# Patient Record
Sex: MALE | Race: WHITE | NOT HISPANIC OR LATINO | Employment: FULL TIME | URBAN - METROPOLITAN AREA
[De-identification: names, ages, dates, MRNs, and addresses within clinical notes are randomized per-mention and may not be internally consistent; named-entity substitution may affect disease eponyms.]

---

## 2017-02-10 ENCOUNTER — OFFICE VISIT (OUTPATIENT)
Dept: INTERNAL MEDICINE | Facility: CLINIC | Age: 57
End: 2017-02-10

## 2017-02-10 VITALS
WEIGHT: 292 LBS | DIASTOLIC BLOOD PRESSURE: 96 MMHG | SYSTOLIC BLOOD PRESSURE: 138 MMHG | OXYGEN SATURATION: 93 % | HEART RATE: 84 BPM | BODY MASS INDEX: 35.56 KG/M2 | RESPIRATION RATE: 16 BRPM | HEIGHT: 76 IN | TEMPERATURE: 98.1 F

## 2017-02-10 DIAGNOSIS — M10.00 IDIOPATHIC GOUT, UNSPECIFIED CHRONICITY, UNSPECIFIED SITE: ICD-10-CM

## 2017-02-10 DIAGNOSIS — E78.5 HYPERLIPIDEMIA, UNSPECIFIED HYPERLIPIDEMIA TYPE: ICD-10-CM

## 2017-02-10 DIAGNOSIS — E66.9 ADIPOSITY: ICD-10-CM

## 2017-02-10 DIAGNOSIS — I10 ESSENTIAL HYPERTENSION: Primary | ICD-10-CM

## 2017-02-10 PROCEDURE — 99213 OFFICE O/P EST LOW 20 MIN: CPT | Performed by: INTERNAL MEDICINE

## 2017-02-10 NOTE — PROGRESS NOTES
Subjective   Jake Saunders is a 56 y.o. male.   11/13/2016  Wt Readings from Last 1 Encounters:   02/10/17 292 lb (132 kg)    he was last seen July 2016, weight 284 then, now 292.    He returns for follow-up of hypertension, hyperlipidemia, and gout    History of Present Illness   He has hypertension treated with metoprolol succinate 50 mg daily and Tribenzor 40/5/25 one each day.  He denies having any chest pain on exertion.  He has no history of known heart disease.    He has bowel hypertriglyceridemia for which diet has been advised.    He has gout and is treated with allopurinol 300 mg daily for prevention.  He has not had any recurrence of gout.    He had prostate screen in July 2016.  His voiding pattern is stable.    I he had a negative screening colonoscopy in September 2014 by Dr. Ceballos, advised repeat after 10 years.    He does not describe any new problems.  The following portions of the patient's history were reviewed and updated as appropriate: allergies, current medications, past family history, past medical history, past social history, past surgical history and problem list.    Review of Systems   Constitutional: Negative.    HENT: Negative.    Eyes: Negative.    Respiratory: Negative.    Cardiovascular: Negative.    Endocrine: Negative.    Genitourinary: Negative.    Skin: Negative.    Neurological: Negative.    Hematological: Negative.    Psychiatric/Behavioral: Negative.        Objective   Physical Exam   Constitutional: He appears well-developed and well-nourished.   HENT:   Head: Normocephalic and atraumatic.   Cardiovascular: Normal rate and regular rhythm.  Exam reveals no gallop and no friction rub.    No murmur heard.  Blood pressure 130/88 and left arm   Pulmonary/Chest: Effort normal and breath sounds normal. No respiratory distress. He has no wheezes. He has no rales.   Abdominal: Soft. Bowel sounds are normal. He exhibits no distension and no mass. There is no tenderness.    Neurological: He is alert.   Skin: Skin is warm.   Psychiatric: His behavior is normal.   Vitals reviewed.      Assessment/Plan   Jake was seen today for hypertension, hyperlipidemia and gout.    Diagnoses and all orders for this visit:    Essential hypertension  Comments:  Blood pressure 130/80 by my determination, continue Tribenzor 40/5/25 one each day and metoprolol succinate 50 mg daily  Orders:  -     Comprehensive Metabolic Panel  -     Urinalysis With Microscopic    Hyperlipidemia, unspecified hyperlipidemia type  Comments:  Advise healthy diet regular exercise and weight reduction, will check lipids  Orders:  -     Lipid Panel    Idiopathic gout, unspecified chronicity, unspecified site  Comments:  Continue allopurinol 300 mg daily, will check uric acid level  Orders:  -     Uric Acid    Adiposity  Comments:  Advise calorie restriction to reduce weight about 1 pound per week        Schedule office follow-up about 6 months, prostate check due at that time                       EMR Dragon/Transcription disclaimer:      Much of this encounter note is an electronic transcription/translation of spoken language to printed text. The electronic translation of spoken language may permit erroneous, or at times, nonsensical words or phrases to be inadvertently transcribed; Although I have reviewed the note for such errors, some may still exist.

## 2017-02-11 LAB
ALBUMIN SERPL-MCNC: 5.1 G/DL (ref 3.5–5.2)
ALBUMIN/GLOB SERPL: 2 G/DL
ALP SERPL-CCNC: 57 U/L (ref 39–117)
ALT SERPL-CCNC: 51 U/L (ref 1–41)
APPEARANCE UR: CLEAR
AST SERPL-CCNC: 35 U/L (ref 1–40)
BACTERIA #/AREA URNS HPF: ABNORMAL /HPF
BILIRUB SERPL-MCNC: 0.3 MG/DL (ref 0.1–1.2)
BILIRUB UR QL STRIP: NEGATIVE
BUN SERPL-MCNC: 25 MG/DL (ref 6–20)
BUN/CREAT SERPL: 20.5 (ref 7–25)
CALCIUM SERPL-MCNC: 9.7 MG/DL (ref 8.6–10.5)
CASTS URNS MICRO: ABNORMAL
CHLORIDE SERPL-SCNC: 99 MMOL/L (ref 98–107)
CHOLEST SERPL-MCNC: 137 MG/DL (ref 0–200)
CO2 SERPL-SCNC: 30 MMOL/L (ref 22–29)
COLOR UR: YELLOW
CREAT SERPL-MCNC: 1.22 MG/DL (ref 0.76–1.27)
EPI CELLS #/AREA URNS HPF: ABNORMAL /HPF
GLOBULIN SER CALC-MCNC: 2.6 GM/DL
GLUCOSE SERPL-MCNC: 99 MG/DL (ref 65–99)
GLUCOSE UR QL: NEGATIVE
HDLC SERPL-MCNC: 38 MG/DL (ref 40–60)
HGB UR QL STRIP: (no result)
KETONES UR QL STRIP: NEGATIVE
LDLC SERPL CALC-MCNC: 65 MG/DL (ref 0–100)
LEUKOCYTE ESTERASE UR QL STRIP: NEGATIVE
NITRITE UR QL STRIP: NEGATIVE
PH UR STRIP: 6 [PH] (ref 5–8)
POTASSIUM SERPL-SCNC: 3.9 MMOL/L (ref 3.5–5.2)
PROT SERPL-MCNC: 7.7 G/DL (ref 6–8.5)
PROT UR QL STRIP: NEGATIVE
RBC #/AREA URNS HPF: ABNORMAL /HPF
SODIUM SERPL-SCNC: 143 MMOL/L (ref 136–145)
SP GR UR: 1.01 (ref 1–1.03)
TRIGL SERPL-MCNC: 172 MG/DL (ref 0–150)
URATE SERPL-MCNC: 6.7 MG/DL (ref 3.4–7)
UROBILINOGEN UR STRIP-MCNC: (no result) MG/DL
VLDLC SERPL CALC-MCNC: 34.4 MG/DL (ref 5–40)
WBC #/AREA URNS HPF: ABNORMAL /HPF

## 2017-02-13 RX ORDER — CETIRIZINE HYDROCHLORIDE 10 MG/1
TABLET ORAL
Qty: 90 TABLET | Refills: 0 | Status: SHIPPED | OUTPATIENT
Start: 2017-02-13 | End: 2017-05-12 | Stop reason: SDUPTHER

## 2017-02-13 RX ORDER — ALLOPURINOL 300 MG/1
TABLET ORAL
Qty: 90 TABLET | Refills: 0 | Status: SHIPPED | OUTPATIENT
Start: 2017-02-13 | End: 2017-05-12 | Stop reason: SDUPTHER

## 2017-02-13 RX ORDER — METOPROLOL SUCCINATE 50 MG/1
TABLET, EXTENDED RELEASE ORAL
Qty: 90 TABLET | Refills: 0 | Status: SHIPPED | OUTPATIENT
Start: 2017-02-13 | End: 2017-05-12 | Stop reason: SDUPTHER

## 2017-02-13 RX ORDER — OLMESARTAN MEDOXOMIL, AMLODIPINE AND HYDROCHLOROTHIAZIDE TABLET 40/5/25 MG 40; 5; 25 MG/1; MG/1; MG/1
TABLET ORAL
Qty: 90 TABLET | Refills: 0 | Status: SHIPPED | OUTPATIENT
Start: 2017-02-13 | End: 2017-05-12 | Stop reason: SDUPTHER

## 2017-05-12 RX ORDER — CETIRIZINE HYDROCHLORIDE 10 MG/1
TABLET ORAL
Qty: 90 TABLET | Refills: 0 | Status: SHIPPED | OUTPATIENT
Start: 2017-05-12 | End: 2017-08-18 | Stop reason: SDUPTHER

## 2017-05-12 RX ORDER — OLMESARTAN MEDOXOMIL, AMLODIPINE AND HYDROCHLOROTHIAZIDE TABLET 40/5/25 MG 40; 5; 25 MG/1; MG/1; MG/1
TABLET ORAL
Qty: 90 TABLET | Refills: 0 | Status: SHIPPED | OUTPATIENT
Start: 2017-05-12 | End: 2017-08-18 | Stop reason: SDUPTHER

## 2017-05-12 RX ORDER — ALLOPURINOL 300 MG/1
TABLET ORAL
Qty: 90 TABLET | Refills: 0 | Status: SHIPPED | OUTPATIENT
Start: 2017-05-12 | End: 2017-08-18 | Stop reason: SDUPTHER

## 2017-05-12 RX ORDER — METOPROLOL SUCCINATE 50 MG/1
TABLET, EXTENDED RELEASE ORAL
Qty: 90 TABLET | Refills: 0 | Status: SHIPPED | OUTPATIENT
Start: 2017-05-12 | End: 2017-08-18 | Stop reason: SDUPTHER

## 2017-08-09 ENCOUNTER — OFFICE VISIT (OUTPATIENT)
Dept: INTERNAL MEDICINE | Facility: CLINIC | Age: 57
End: 2017-08-09

## 2017-08-09 VITALS
HEIGHT: 76 IN | DIASTOLIC BLOOD PRESSURE: 90 MMHG | BODY MASS INDEX: 35.07 KG/M2 | RESPIRATION RATE: 16 BRPM | OXYGEN SATURATION: 97 % | WEIGHT: 288 LBS | SYSTOLIC BLOOD PRESSURE: 120 MMHG | TEMPERATURE: 96.5 F | HEART RATE: 82 BPM

## 2017-08-09 DIAGNOSIS — Z00.00 HEALTH CARE MAINTENANCE: ICD-10-CM

## 2017-08-09 DIAGNOSIS — M77.8 TENDINITIS OF ELBOW: ICD-10-CM

## 2017-08-09 DIAGNOSIS — E78.5 HYPERLIPIDEMIA, UNSPECIFIED HYPERLIPIDEMIA TYPE: Primary | ICD-10-CM

## 2017-08-09 DIAGNOSIS — M10.00 IDIOPATHIC GOUT, UNSPECIFIED CHRONICITY, UNSPECIFIED SITE: ICD-10-CM

## 2017-08-09 DIAGNOSIS — I10 ESSENTIAL HYPERTENSION: ICD-10-CM

## 2017-08-09 PROCEDURE — 99213 OFFICE O/P EST LOW 20 MIN: CPT | Performed by: INTERNAL MEDICINE

## 2017-08-09 NOTE — PROGRESS NOTES
Subjective   Jake Saunders is a 57 y.o. male.   5/12/2017  Wt Readings from Last 1 Encounters:   08/09/17 288 lb (131 kg)   He was last seen in February 2017, weight 2 then, now 288.    He returns for follow-up of hypertension, hyperlipidemia, and gout    History of Present Illness   He has hypertension treated with metoprolol succinate 50 mg daily and Tribenzor 40/5/25 one each day.  He does not have any known cardiac disease and no exertional chest pain.    He has hypertriglyceridemia for which diet has been advised.    He has gout treated with allopurinol 300 mg daily for prevention.  No gout episodes in the interim.    He is due for prostate screen.  Voiding pattern is stable.  His PSA last summer was 1.81    He had a negative screening colonoscopy in September 2014 by Dr. Ceballos, and he was advised repeat colonoscopy after 10 years.    He has been having some pain in the right elbow medially, noted when he tries to play golf, but not so much at other times  The following portions of the patient's history were reviewed and updated as appropriate: allergies, current medications, past family history, past medical history, past social history, past surgical history and problem list.    Review of Systems   Constitutional: Negative.    HENT: Negative.    Eyes: Negative.    Respiratory: Negative.    Cardiovascular: Negative.    Endocrine: Negative.    Genitourinary: Negative.    Skin: Negative.    Neurological: Negative.    Hematological: Negative.    Psychiatric/Behavioral: Negative.        Objective   Physical Exam   Constitutional: He appears well-developed and well-nourished.   HENT:   Head: Normocephalic and atraumatic.   Cardiovascular: Normal rate and regular rhythm.  Exam reveals no gallop and no friction rub.    No murmur heard.  Pulmonary/Chest: Effort normal and breath sounds normal. No respiratory distress. He has no wheezes. He has no rales.   Abdominal: Soft. Bowel sounds are normal. He exhibits no  distension and no mass. There is no tenderness.   Genitourinary: Prostate normal.   Musculoskeletal:   Some tenderness of right elbow medially consistent with tendinitis   Neurological: He is alert.   Skin: Skin is warm.   Psychiatric: His behavior is normal.   Vitals reviewed.      Assessment/Plan   Jake was seen today for hypertension and gout.    Diagnoses and all orders for this visit:    Hyperlipidemia, unspecified hyperlipidemia type  -     Lipid Panel    Essential hypertension  -     Comprehensive Metabolic Panel  -     Urinalysis With Microscopic    Idiopathic gout, unspecified chronicity, unspecified site    Health care maintenance  Comments:  We will check PSA level  Orders:  -     PSA    Tendinitis of elbow  Comments:  Advise application of ice to the painful area as needed, probably will need to moderate golf some      Schedule office follow-up about 6 months, return sooner if needed                         EMR Dragon/Transcription disclaimer:      Much of this encounter note is an electronic transcription/translation of spoken language to printed text. The electronic translation of spoken language may permit erroneous, or at times, nonsensical words or phrases to be inadvertently transcribed; Although I have reviewed the note for such errors, some may still exist.

## 2017-08-10 LAB
ALBUMIN SERPL-MCNC: 4.8 G/DL (ref 3.5–5.2)
ALBUMIN/GLOB SERPL: 1.5 G/DL
ALP SERPL-CCNC: 58 U/L (ref 39–117)
ALT SERPL-CCNC: 73 U/L (ref 1–41)
APPEARANCE UR: CLEAR
AST SERPL-CCNC: 58 U/L (ref 1–40)
BACTERIA #/AREA URNS HPF: NORMAL /HPF
BILIRUB SERPL-MCNC: 0.5 MG/DL (ref 0.1–1.2)
BILIRUB UR QL STRIP: NEGATIVE
BUN SERPL-MCNC: 25 MG/DL (ref 6–20)
BUN/CREAT SERPL: 20.8 (ref 7–25)
CALCIUM SERPL-MCNC: 10.1 MG/DL (ref 8.6–10.5)
CASTS URNS MICRO: NORMAL
CHLORIDE SERPL-SCNC: 96 MMOL/L (ref 98–107)
CHOLEST SERPL-MCNC: 168 MG/DL (ref 0–200)
CO2 SERPL-SCNC: 26.8 MMOL/L (ref 22–29)
COLOR UR: YELLOW
CREAT SERPL-MCNC: 1.2 MG/DL (ref 0.76–1.27)
EPI CELLS #/AREA URNS HPF: NORMAL /HPF
GLOBULIN SER CALC-MCNC: 3.3 GM/DL
GLUCOSE SERPL-MCNC: 95 MG/DL (ref 65–99)
GLUCOSE UR QL: NEGATIVE
HDLC SERPL-MCNC: 37 MG/DL (ref 40–60)
HGB UR QL STRIP: NEGATIVE
KETONES UR QL STRIP: NEGATIVE
LDLC SERPL CALC-MCNC: 96 MG/DL (ref 0–100)
LEUKOCYTE ESTERASE UR QL STRIP: NEGATIVE
NITRITE UR QL STRIP: NEGATIVE
PH UR STRIP: 6 [PH] (ref 5–8)
POTASSIUM SERPL-SCNC: 3.7 MMOL/L (ref 3.5–5.2)
PROT SERPL-MCNC: 8.1 G/DL (ref 6–8.5)
PROT UR QL STRIP: NEGATIVE
PSA SERPL-MCNC: 1.46 NG/ML (ref 0–4)
RBC #/AREA URNS HPF: NORMAL /HPF
SODIUM SERPL-SCNC: 139 MMOL/L (ref 136–145)
SP GR UR: 1.01 (ref 1–1.03)
TRIGL SERPL-MCNC: 174 MG/DL (ref 0–150)
UROBILINOGEN UR STRIP-MCNC: (no result) MG/DL
VLDLC SERPL CALC-MCNC: 34.8 MG/DL (ref 5–40)
WBC #/AREA URNS HPF: NORMAL /HPF

## 2017-08-18 RX ORDER — METOPROLOL SUCCINATE 50 MG/1
TABLET, EXTENDED RELEASE ORAL
Qty: 90 TABLET | Refills: 0 | Status: SHIPPED | OUTPATIENT
Start: 2017-08-18 | End: 2017-11-21 | Stop reason: SDUPTHER

## 2017-08-18 RX ORDER — OLMESARTAN MEDOXOMIL, AMLODIPINE AND HYDROCHLOROTHIAZIDE TABLET 40/5/25 MG 40; 5; 25 MG/1; MG/1; MG/1
TABLET ORAL
Qty: 90 TABLET | Refills: 0 | Status: SHIPPED | OUTPATIENT
Start: 2017-08-18 | End: 2017-11-24 | Stop reason: SDUPTHER

## 2017-08-18 RX ORDER — CETIRIZINE HYDROCHLORIDE 10 MG/1
TABLET ORAL
Qty: 90 TABLET | Refills: 0 | Status: SHIPPED | OUTPATIENT
Start: 2017-08-18 | End: 2017-11-21 | Stop reason: SDUPTHER

## 2017-08-18 RX ORDER — ALLOPURINOL 300 MG/1
TABLET ORAL
Qty: 90 TABLET | Refills: 0 | Status: SHIPPED | OUTPATIENT
Start: 2017-08-18 | End: 2017-11-21 | Stop reason: SDUPTHER

## 2017-08-30 DIAGNOSIS — R74.8 ELEVATED LIVER ENZYMES: Primary | ICD-10-CM

## 2017-09-04 ENCOUNTER — RESULTS ENCOUNTER (OUTPATIENT)
Dept: INTERNAL MEDICINE | Facility: CLINIC | Age: 57
End: 2017-09-04

## 2017-09-04 DIAGNOSIS — R74.8 ELEVATED LIVER ENZYMES: ICD-10-CM

## 2017-11-21 RX ORDER — CETIRIZINE HYDROCHLORIDE 10 MG/1
TABLET ORAL
Qty: 90 TABLET | Refills: 0 | Status: SHIPPED | OUTPATIENT
Start: 2017-11-21 | End: 2018-02-19 | Stop reason: SDUPTHER

## 2017-11-21 RX ORDER — ALLOPURINOL 300 MG/1
TABLET ORAL
Qty: 90 TABLET | Refills: 0 | Status: SHIPPED | OUTPATIENT
Start: 2017-11-21 | End: 2018-02-19 | Stop reason: SDUPTHER

## 2017-11-21 RX ORDER — METOPROLOL SUCCINATE 50 MG/1
TABLET, EXTENDED RELEASE ORAL
Qty: 90 TABLET | Refills: 0 | Status: SHIPPED | OUTPATIENT
Start: 2017-11-21 | End: 2018-02-19 | Stop reason: SDUPTHER

## 2017-11-27 RX ORDER — OLMESARTAN MEDOXOMIL, AMLODIPINE AND HYDROCHLOROTHIAZIDE TABLET 40/5/25 MG 40; 5; 25 MG/1; MG/1; MG/1
TABLET ORAL
Qty: 90 TABLET | Refills: 0 | Status: SHIPPED | OUTPATIENT
Start: 2017-11-27 | End: 2018-02-22 | Stop reason: SDUPTHER

## 2018-02-19 RX ORDER — METOPROLOL SUCCINATE 50 MG/1
TABLET, EXTENDED RELEASE ORAL
Qty: 90 TABLET | Refills: 0 | Status: SHIPPED | OUTPATIENT
Start: 2018-02-19 | End: 2018-05-22 | Stop reason: SDUPTHER

## 2018-02-19 RX ORDER — ALLOPURINOL 300 MG/1
TABLET ORAL
Qty: 90 TABLET | Refills: 0 | Status: SHIPPED | OUTPATIENT
Start: 2018-02-19 | End: 2018-05-25 | Stop reason: SDUPTHER

## 2018-02-19 RX ORDER — CETIRIZINE HYDROCHLORIDE 10 MG/1
TABLET ORAL
Qty: 90 TABLET | Refills: 0 | Status: SHIPPED | OUTPATIENT
Start: 2018-02-19 | End: 2018-05-25 | Stop reason: SDUPTHER

## 2018-02-22 RX ORDER — OLMESARTAN MEDOXOMIL, AMLODIPINE AND HYDROCHLOROTHIAZIDE TABLET 40/5/25 MG 40; 5; 25 MG/1; MG/1; MG/1
TABLET ORAL
Qty: 90 TABLET | Refills: 0 | Status: SHIPPED | OUTPATIENT
Start: 2018-02-22 | End: 2018-05-25 | Stop reason: SDUPTHER

## 2018-02-28 ENCOUNTER — OFFICE VISIT (OUTPATIENT)
Dept: INTERNAL MEDICINE | Facility: CLINIC | Age: 58
End: 2018-02-28

## 2018-02-28 VITALS
RESPIRATION RATE: 16 BRPM | BODY MASS INDEX: 35.19 KG/M2 | HEART RATE: 75 BPM | DIASTOLIC BLOOD PRESSURE: 88 MMHG | OXYGEN SATURATION: 97 % | WEIGHT: 289 LBS | TEMPERATURE: 97.3 F | HEIGHT: 76 IN | SYSTOLIC BLOOD PRESSURE: 120 MMHG

## 2018-02-28 DIAGNOSIS — I10 ESSENTIAL HYPERTENSION: Primary | ICD-10-CM

## 2018-02-28 DIAGNOSIS — M10.00 IDIOPATHIC GOUT, UNSPECIFIED CHRONICITY, UNSPECIFIED SITE: ICD-10-CM

## 2018-02-28 DIAGNOSIS — E78.5 HYPERLIPIDEMIA, UNSPECIFIED HYPERLIPIDEMIA TYPE: ICD-10-CM

## 2018-02-28 PROCEDURE — 99213 OFFICE O/P EST LOW 20 MIN: CPT | Performed by: INTERNAL MEDICINE

## 2018-02-28 NOTE — PROGRESS NOTES
Subjective   Jake Saunders is a 57 y.o. male.   2/22/2018  Wt Readings from Last 1 Encounters:   02/28/18 131 kg (289 lb)   He was last seen in August 2017 neck, weight 288 pounds then, now 289 now.    He returns for follow-up of hypertension, hyperlipidemia, gout    History of Present Illness   He has hypertension treated with Tribenzor 40/5/25 one each day and metoprolol succinate 50 mg daily.  He goes to a gym regularly to exercise and does not have any known cardiac disease or cardiac symptoms.    He has hypertriglyceridemia and is been advised healthy diet and weight reduction.    He has history of gout treated with allopurinol 300 mg daily for prevention.  No gout episodes in the interim.    He had prostate screen in August, negative.  His PSA level was 1.46    He had a negative screening colonoscopy September 2014 by Dr. Ceballos, advise to repeat colonoscopy after 10 years.    He has had some recent dry cough and drainage.  He is in a [employer] environment at work.  He has used some Zyrtec 10 mg daily.    No other new problems are described  The following portions of the patient's history were reviewed and updated as appropriate: allergies, current medications, past family history, past medical history, past social history, past surgical history and problem list.    Review of Systems   Constitutional: Negative.    HENT: Negative.    Eyes: Negative.    Respiratory: Negative.    Cardiovascular: Negative.    Endocrine: Negative.    Genitourinary: Negative.    Skin: Negative.    Neurological: Negative.    Hematological: Negative.    Psychiatric/Behavioral: Negative.        Objective   Physical Exam   Constitutional: He appears well-developed and well-nourished.   HENT:   Head: Normocephalic and atraumatic.   Cardiovascular: Normal rate and regular rhythm.  Exam reveals no gallop and no friction rub.    No murmur heard.  Pulmonary/Chest: Effort normal and breath sounds normal. No respiratory distress. He has no  wheezes. He has no rales.   Abdominal: Soft. Bowel sounds are normal. He exhibits no distension and no mass. There is no tenderness.   Neurological: He is alert.   Skin: Skin is warm.   Psychiatric: His behavior is normal.   Vitals reviewed.      Assessment/Plan   Jake was seen today for hypertension, hyperlipidemia and gout.    Diagnoses and all orders for this visit:    Essential hypertension  Comments:  Continue Tribenzor 40/5/25 one each day and metoprolol succinate 50 mg daily  Orders:  -     Comprehensive Metabolic Panel  -     Urinalysis With Microscopic - Urine, Clean Catch    Hyperlipidemia, unspecified hyperlipidemia type  Comments:  Continue healthy diet and regular exercise, we will check chemistries and lipids  Orders:  -     Lipid Panel    Idiopathic gout, unspecified chronicity, unspecified site  Comments:  Continue allopurinol 300 mg once daily for gout prevention        Schedule office follow-up about 6 months, return sooner if needed.  Prostate check will be due at that time                       EMR Dragon/Transcription disclaimer:      Much of this encounter note is an electronic transcription/translation of spoken language to printed text. The electronic translation of spoken language may permit erroneous, or at times, nonsensical words or phrases to be inadvertently transcribed; Although I have reviewed the note for such errors, some may still exist.

## 2018-03-01 LAB
ALBUMIN SERPL-MCNC: 5.1 G/DL (ref 3.5–5.2)
ALBUMIN/GLOB SERPL: 1.9 G/DL
ALP SERPL-CCNC: 58 U/L (ref 39–117)
ALT SERPL-CCNC: 48 U/L (ref 1–41)
APPEARANCE UR: CLEAR
AST SERPL-CCNC: 37 U/L (ref 1–40)
BACTERIA #/AREA URNS HPF: NORMAL /HPF
BILIRUB SERPL-MCNC: 0.5 MG/DL (ref 0.1–1.2)
BILIRUB UR QL STRIP: NEGATIVE
BUN SERPL-MCNC: 23 MG/DL (ref 6–20)
BUN/CREAT SERPL: 17.6 (ref 7–25)
CALCIUM SERPL-MCNC: 9.7 MG/DL (ref 8.6–10.5)
CASTS URNS MICRO: NORMAL
CHLORIDE SERPL-SCNC: 96 MMOL/L (ref 98–107)
CHOLEST SERPL-MCNC: 138 MG/DL (ref 0–200)
CO2 SERPL-SCNC: 29.5 MMOL/L (ref 22–29)
COLOR UR: YELLOW
CREAT SERPL-MCNC: 1.31 MG/DL (ref 0.76–1.27)
EPI CELLS #/AREA URNS HPF: NORMAL /HPF
GFR SERPLBLD CREATININE-BSD FMLA CKD-EPI: 57 ML/MIN/1.73
GFR SERPLBLD CREATININE-BSD FMLA CKD-EPI: 69 ML/MIN/1.73
GLOBULIN SER CALC-MCNC: 2.7 GM/DL
GLUCOSE SERPL-MCNC: 93 MG/DL (ref 65–99)
GLUCOSE UR QL: NEGATIVE
HDLC SERPL-MCNC: 35 MG/DL (ref 40–60)
HGB UR QL STRIP: NEGATIVE
KETONES UR QL STRIP: NEGATIVE
LDLC SERPL CALC-MCNC: 72 MG/DL (ref 0–100)
LEUKOCYTE ESTERASE UR QL STRIP: NEGATIVE
NITRITE UR QL STRIP: NEGATIVE
PH UR STRIP: 6 [PH] (ref 5–8)
POTASSIUM SERPL-SCNC: 4 MMOL/L (ref 3.5–5.2)
PROT SERPL-MCNC: 7.8 G/DL (ref 6–8.5)
PROT UR QL STRIP: NEGATIVE
RBC #/AREA URNS HPF: NORMAL /HPF
SODIUM SERPL-SCNC: 140 MMOL/L (ref 136–145)
SP GR UR: 1.01 (ref 1–1.03)
TRIGL SERPL-MCNC: 153 MG/DL (ref 0–150)
UROBILINOGEN UR STRIP-MCNC: (no result) MG/DL
VLDLC SERPL CALC-MCNC: 30.6 MG/DL (ref 5–40)
WBC #/AREA URNS HPF: NORMAL /HPF

## 2018-05-22 RX ORDER — METOPROLOL SUCCINATE 50 MG/1
TABLET, EXTENDED RELEASE ORAL
Qty: 90 TABLET | Refills: 0 | Status: SHIPPED | OUTPATIENT
Start: 2018-05-22 | End: 2018-08-20 | Stop reason: SDUPTHER

## 2018-05-25 RX ORDER — CETIRIZINE HYDROCHLORIDE 10 MG/1
TABLET ORAL
Qty: 90 TABLET | Refills: 0 | Status: SHIPPED | OUTPATIENT
Start: 2018-05-25 | End: 2018-08-20 | Stop reason: SDUPTHER

## 2018-05-25 RX ORDER — ALLOPURINOL 300 MG/1
TABLET ORAL
Qty: 90 TABLET | Refills: 0 | Status: SHIPPED | OUTPATIENT
Start: 2018-05-25 | End: 2018-08-20 | Stop reason: SDUPTHER

## 2018-05-25 RX ORDER — OLMESARTAN MEDOXOMIL, AMLODIPINE AND HYDROCHLOROTHIAZIDE TABLET 40/5/25 MG 40; 5; 25 MG/1; MG/1; MG/1
TABLET ORAL
Qty: 90 TABLET | Refills: 0 | Status: SHIPPED | OUTPATIENT
Start: 2018-05-25 | End: 2018-08-23 | Stop reason: SDUPTHER

## 2018-08-20 RX ORDER — ALLOPURINOL 300 MG/1
TABLET ORAL
Qty: 90 TABLET | Refills: 0 | Status: SHIPPED | OUTPATIENT
Start: 2018-08-20 | End: 2018-11-18 | Stop reason: SDUPTHER

## 2018-08-20 RX ORDER — METOPROLOL SUCCINATE 50 MG/1
TABLET, EXTENDED RELEASE ORAL
Qty: 90 TABLET | Refills: 0 | Status: SHIPPED | OUTPATIENT
Start: 2018-08-20 | End: 2018-11-15 | Stop reason: SDUPTHER

## 2018-08-20 RX ORDER — CETIRIZINE HYDROCHLORIDE 10 MG/1
TABLET ORAL
Qty: 90 TABLET | Refills: 0 | Status: SHIPPED | OUTPATIENT
Start: 2018-08-20 | End: 2018-11-18 | Stop reason: SDUPTHER

## 2018-08-23 RX ORDER — OLMESARTAN MEDOXOMIL, AMLODIPINE AND HYDROCHLOROTHIAZIDE TABLET 40/5/25 MG 40; 5; 25 MG/1; MG/1; MG/1
TABLET ORAL
Qty: 90 TABLET | Refills: 0 | Status: SHIPPED | OUTPATIENT
Start: 2018-08-23 | End: 2018-11-18 | Stop reason: SDUPTHER

## 2018-08-29 ENCOUNTER — OFFICE VISIT (OUTPATIENT)
Dept: INTERNAL MEDICINE | Facility: CLINIC | Age: 58
End: 2018-08-29

## 2018-08-29 VITALS
BODY MASS INDEX: 35.58 KG/M2 | DIASTOLIC BLOOD PRESSURE: 90 MMHG | OXYGEN SATURATION: 97 % | TEMPERATURE: 97.6 F | SYSTOLIC BLOOD PRESSURE: 130 MMHG | HEIGHT: 76 IN | WEIGHT: 292.2 LBS | RESPIRATION RATE: 16 BRPM | HEART RATE: 80 BPM

## 2018-08-29 DIAGNOSIS — Z12.5 PROSTATE CANCER SCREENING: ICD-10-CM

## 2018-08-29 DIAGNOSIS — E78.5 HYPERLIPIDEMIA, UNSPECIFIED HYPERLIPIDEMIA TYPE: ICD-10-CM

## 2018-08-29 DIAGNOSIS — I10 ESSENTIAL HYPERTENSION: Primary | ICD-10-CM

## 2018-08-29 DIAGNOSIS — M10.00 IDIOPATHIC GOUT, UNSPECIFIED CHRONICITY, UNSPECIFIED SITE: ICD-10-CM

## 2018-08-29 PROCEDURE — 99213 OFFICE O/P EST LOW 20 MIN: CPT | Performed by: INTERNAL MEDICINE

## 2018-08-29 NOTE — PROGRESS NOTES
Subjective   Jake Saunders is a 58 y.o. male.   8/23/2018  Wt Readings from Last 1 Encounters:   08/29/18 133 kg (292 lb 3.2 oz)   He was last seen in February 2018 neck, weight 289 then, 292 now.    He returns for follow-up of hypertension, hyperlipidemia, gout, and prostate screen    History of Present Illness   I he has hypertension treated with Tribenzor 40/5/25 one each day and metoprolol succinate 50 mg daily.  He does not have any cardiac history or symptoms.    He has hypertriglyceridemia and is been advised diet and weight reduction.  Lipid studies in February showed mild elevation of triglycerides and slightly low HDL.    His history of gout treated with allopurinol 300 mg daily for prevention.    He is due for prostate screen.  His PSA level last August was 1.46.  He has nocturia once or twice a night but no other voiding problems described.    He had a negative screening colonoscopy in September 2014 by Dr. Ceballos.  A repeat screening colonoscopy after 10 years was advised.    He denies having new problems  The following portions of the patient's history were reviewed and updated as appropriate: allergies, current medications, past family history, past medical history, past social history, past surgical history and problem list.    Review of Systems   Constitutional: Negative.    HENT: Negative.    Eyes: Negative.    Respiratory: Negative.    Cardiovascular: Negative.    Endocrine: Negative.    Genitourinary: Negative.    Skin: Negative.    Neurological: Negative.    Hematological: Negative.    Psychiatric/Behavioral: Negative.        Objective   Physical Exam   Constitutional: He appears well-developed and well-nourished.   HENT:   Head: Normocephalic and atraumatic.   Cardiovascular: Normal rate and regular rhythm.  Exam reveals no gallop and no friction rub.    No murmur heard.  Pressure 136/86, left arm sitting   Pulmonary/Chest: Effort normal and breath sounds normal. No respiratory distress. He  has no wheezes. He has no rales.   Abdominal: Soft. Bowel sounds are normal. He exhibits no distension and no mass. There is no tenderness.   Genitourinary: Rectum normal and prostate normal.   Neurological: He is alert.   Skin: Skin is warm.   Psychiatric: His behavior is normal.   Vitals reviewed.      Assessment/Plan   Jake was seen today for hypertension and hyperlipidemia.    Diagnoses and all orders for this visit:    Essential hypertension  Comments:  Continue Tribenzor 40/5/25 one each day and metoprolol succinate 50 mg daily  Orders:  -     Comprehensive Metabolic Panel  -     Urinalysis With Microscopic - Urine, Clean Catch    Hyperlipidemia, unspecified hyperlipidemia type  Comments:  Advise healthy diet regular exercise and weight reduction, we will check chemistries and lipids  Orders:  -     Lipid Panel    Idiopathic gout, unspecified chronicity, unspecified site  Comments:  Continue allopurinol 300 mg once daily for prevention    Prostate cancer screening  Comments:  Prostate gland is benign on digital examination, will check screening PSA level  Orders:  -     PSA Screen        Advise office follow-up about 6 months                       EMR Dragon/Transcription disclaimer:      Much of this encounter note is an electronic transcription/translation of spoken language to printed text. The electronic translation of spoken language may permit erroneous, or at times, nonsensical words or phrases to be inadvertently transcribed; Although I have reviewed the note for such errors, some may still exist.

## 2018-08-30 LAB
ALBUMIN SERPL-MCNC: 5.1 G/DL (ref 3.5–5.2)
ALBUMIN/GLOB SERPL: 1.6 G/DL
ALP SERPL-CCNC: 57 U/L (ref 39–117)
ALT SERPL-CCNC: 90 U/L (ref 1–41)
APPEARANCE UR: CLEAR
AST SERPL-CCNC: 61 U/L (ref 1–40)
BACTERIA #/AREA URNS HPF: NORMAL /HPF
BILIRUB SERPL-MCNC: 0.4 MG/DL (ref 0.1–1.2)
BILIRUB UR QL STRIP: NEGATIVE
BUN SERPL-MCNC: 22 MG/DL (ref 6–20)
BUN/CREAT SERPL: 18.3 (ref 7–25)
CALCIUM SERPL-MCNC: 9.6 MG/DL (ref 8.6–10.5)
CASTS URNS MICRO: NORMAL
CHLORIDE SERPL-SCNC: 98 MMOL/L (ref 98–107)
CHOLEST SERPL-MCNC: 155 MG/DL (ref 0–200)
CO2 SERPL-SCNC: 27.1 MMOL/L (ref 22–29)
COLOR UR: YELLOW
CREAT SERPL-MCNC: 1.2 MG/DL (ref 0.76–1.27)
EPI CELLS #/AREA URNS HPF: NORMAL /HPF
GLOBULIN SER CALC-MCNC: 3.1 GM/DL
GLUCOSE SERPL-MCNC: 97 MG/DL (ref 65–99)
GLUCOSE UR QL: NEGATIVE
HDLC SERPL-MCNC: 41 MG/DL (ref 40–60)
HGB UR QL STRIP: NEGATIVE
KETONES UR QL STRIP: NEGATIVE
LDLC SERPL CALC-MCNC: 87 MG/DL (ref 0–100)
LEUKOCYTE ESTERASE UR QL STRIP: NEGATIVE
NITRITE UR QL STRIP: NEGATIVE
PH UR STRIP: 5.5 [PH] (ref 5–8)
POTASSIUM SERPL-SCNC: 3.9 MMOL/L (ref 3.5–5.2)
PROT SERPL-MCNC: 8.2 G/DL (ref 6–8.5)
PROT UR QL STRIP: NEGATIVE
PSA SERPL-MCNC: 2.1 NG/ML (ref 0–4)
RBC #/AREA URNS HPF: NORMAL /HPF
SODIUM SERPL-SCNC: 140 MMOL/L (ref 136–145)
SP GR UR: 1.01 (ref 1–1.03)
TRIGL SERPL-MCNC: 134 MG/DL (ref 0–150)
UROBILINOGEN UR STRIP-MCNC: (no result) MG/DL
VLDLC SERPL CALC-MCNC: 26.8 MG/DL (ref 5–40)
WBC #/AREA URNS HPF: NORMAL /HPF

## 2018-09-11 DIAGNOSIS — R74.8 ELEVATED LIVER ENZYMES: Primary | ICD-10-CM

## 2018-09-16 ENCOUNTER — RESULTS ENCOUNTER (OUTPATIENT)
Dept: INTERNAL MEDICINE | Facility: CLINIC | Age: 58
End: 2018-09-16

## 2018-09-16 DIAGNOSIS — R74.8 ELEVATED LIVER ENZYMES: ICD-10-CM

## 2018-11-15 RX ORDER — METOPROLOL SUCCINATE 50 MG/1
TABLET, EXTENDED RELEASE ORAL
Qty: 90 TABLET | Refills: 0 | Status: SHIPPED | OUTPATIENT
Start: 2018-11-15 | End: 2019-02-13 | Stop reason: SDUPTHER

## 2018-11-19 RX ORDER — OLMESARTAN MEDOXOMIL, AMLODIPINE AND HYDROCHLOROTHIAZIDE TABLET 40/5/25 MG 40; 5; 25 MG/1; MG/1; MG/1
TABLET ORAL
Qty: 90 TABLET | Refills: 3 | Status: SHIPPED | OUTPATIENT
Start: 2018-11-19 | End: 2019-12-10 | Stop reason: SDUPTHER

## 2018-11-19 RX ORDER — LORATADINE 10 MG
TABLET ORAL
Qty: 90 TABLET | Refills: 3 | Status: SHIPPED | OUTPATIENT
Start: 2018-11-19 | End: 2019-12-10 | Stop reason: SDUPTHER

## 2018-11-19 RX ORDER — ALLOPURINOL 300 MG/1
TABLET ORAL
Qty: 90 TABLET | Refills: 3 | Status: SHIPPED | OUTPATIENT
Start: 2018-11-19 | End: 2019-06-10 | Stop reason: SDUPTHER

## 2019-02-13 RX ORDER — METOPROLOL SUCCINATE 50 MG/1
TABLET, EXTENDED RELEASE ORAL
Qty: 90 TABLET | Refills: 1 | Status: SHIPPED | OUTPATIENT
Start: 2019-02-13 | End: 2019-02-13 | Stop reason: SDUPTHER

## 2019-02-13 RX ORDER — METOPROLOL SUCCINATE 50 MG/1
TABLET, EXTENDED RELEASE ORAL
Qty: 90 TABLET | Refills: 1 | Status: SHIPPED | OUTPATIENT
Start: 2019-02-13 | End: 2019-02-28 | Stop reason: SDUPTHER

## 2019-02-28 ENCOUNTER — OFFICE VISIT (OUTPATIENT)
Dept: FAMILY MEDICINE CLINIC | Facility: CLINIC | Age: 59
End: 2019-02-28

## 2019-02-28 VITALS
WEIGHT: 289 LBS | SYSTOLIC BLOOD PRESSURE: 136 MMHG | DIASTOLIC BLOOD PRESSURE: 89 MMHG | HEIGHT: 76 IN | OXYGEN SATURATION: 93 % | BODY MASS INDEX: 35.19 KG/M2 | HEART RATE: 85 BPM

## 2019-02-28 DIAGNOSIS — I10 ESSENTIAL HYPERTENSION: Primary | ICD-10-CM

## 2019-02-28 DIAGNOSIS — M10.9 GOUT, UNSPECIFIED CAUSE, UNSPECIFIED CHRONICITY, UNSPECIFIED SITE: ICD-10-CM

## 2019-02-28 PROCEDURE — 99214 OFFICE O/P EST MOD 30 MIN: CPT | Performed by: NURSE PRACTITIONER

## 2019-02-28 RX ORDER — METOPROLOL SUCCINATE 50 MG/1
TABLET, EXTENDED RELEASE ORAL
Qty: 90 TABLET | Refills: 2 | Status: SHIPPED | OUTPATIENT
Start: 2019-02-28 | End: 2019-06-04 | Stop reason: SDUPTHER

## 2019-03-06 DIAGNOSIS — I10 ESSENTIAL HYPERTENSION: Primary | ICD-10-CM

## 2019-03-06 DIAGNOSIS — Z12.5 ENCOUNTER FOR PROSTATE CANCER SCREENING: ICD-10-CM

## 2019-03-06 DIAGNOSIS — E78.5 HYPERLIPIDEMIA, UNSPECIFIED HYPERLIPIDEMIA TYPE: ICD-10-CM

## 2019-03-07 ENCOUNTER — RESULTS ENCOUNTER (OUTPATIENT)
Dept: FAMILY MEDICINE CLINIC | Facility: CLINIC | Age: 59
End: 2019-03-07

## 2019-03-07 DIAGNOSIS — I10 ESSENTIAL HYPERTENSION: ICD-10-CM

## 2019-03-07 DIAGNOSIS — E78.5 HYPERLIPIDEMIA, UNSPECIFIED HYPERLIPIDEMIA TYPE: ICD-10-CM

## 2019-03-07 DIAGNOSIS — Z12.5 ENCOUNTER FOR PROSTATE CANCER SCREENING: ICD-10-CM

## 2019-03-07 LAB
ALBUMIN SERPL-MCNC: 5 G/DL (ref 3.5–5.2)
ALBUMIN/GLOB SERPL: 1.9 G/DL
ALP SERPL-CCNC: 53 U/L (ref 39–117)
ALT SERPL-CCNC: 72 U/L (ref 1–41)
AST SERPL-CCNC: 42 U/L (ref 1–40)
BILIRUB SERPL-MCNC: 0.8 MG/DL (ref 0.1–1.2)
BUN SERPL-MCNC: 15 MG/DL (ref 6–20)
BUN/CREAT SERPL: 13.4 (ref 7–25)
CALCIUM SERPL-MCNC: 10 MG/DL (ref 8.6–10.5)
CHLORIDE SERPL-SCNC: 99 MMOL/L (ref 98–107)
CHOLEST SERPL-MCNC: 141 MG/DL (ref 0–200)
CO2 SERPL-SCNC: 28.9 MMOL/L (ref 22–29)
CREAT SERPL-MCNC: 1.12 MG/DL (ref 0.76–1.27)
GLOBULIN SER CALC-MCNC: 2.6 GM/DL
GLUCOSE SERPL-MCNC: 97 MG/DL (ref 65–99)
HDLC SERPL-MCNC: 40 MG/DL (ref 40–60)
LDLC SERPL CALC-MCNC: 70 MG/DL (ref 0–100)
LDLC/HDLC SERPL: 1.76 {RATIO}
POTASSIUM SERPL-SCNC: 4.3 MMOL/L (ref 3.5–5.2)
PROT SERPL-MCNC: 7.6 G/DL (ref 6–8.5)
PSA SERPL-MCNC: 1.97 NG/ML (ref 0–4)
SODIUM SERPL-SCNC: 141 MMOL/L (ref 136–145)
TRIGL SERPL-MCNC: 153 MG/DL (ref 0–150)
VLDLC SERPL CALC-MCNC: 30.6 MG/DL (ref 5–40)

## 2019-03-26 DIAGNOSIS — R79.89 ELEVATED LIVER FUNCTION TESTS: Primary | ICD-10-CM

## 2019-03-27 DIAGNOSIS — I10 ESSENTIAL HYPERTENSION: ICD-10-CM

## 2019-03-27 DIAGNOSIS — R79.89 ELEVATED LIVER FUNCTION TESTS: ICD-10-CM

## 2019-03-29 LAB
FERRITIN SERPL-MCNC: 308 NG/ML (ref 30–400)
HBV SURFACE AG SERPL QL IA: NEGATIVE
HCV AB S/CO SERPL IA: <0.1 S/CO RATIO (ref 0–0.9)

## 2019-04-09 DIAGNOSIS — R79.89 ELEVATED LIVER FUNCTION TESTS: Primary | ICD-10-CM

## 2019-06-04 RX ORDER — METOPROLOL SUCCINATE 50 MG/1
TABLET, EXTENDED RELEASE ORAL
Qty: 90 TABLET | Refills: 2 | Status: SHIPPED | OUTPATIENT
Start: 2019-06-04 | End: 2020-03-08 | Stop reason: SDUPTHER

## 2019-06-04 RX ORDER — CETIRIZINE HYDROCHLORIDE 10 MG/1
TABLET ORAL
Qty: 90 TABLET | Refills: 3 | OUTPATIENT
Start: 2019-06-04

## 2019-06-04 RX ORDER — ALLOPURINOL 300 MG/1
TABLET ORAL
Qty: 90 TABLET | Refills: 3 | OUTPATIENT
Start: 2019-06-04

## 2019-06-04 RX ORDER — OLMESARTAN MEDOXOMIL, AMLODIPINE AND HYDROCHLOROTHIAZIDE TABLET 40/5/25 MG 40; 5; 25 MG/1; MG/1; MG/1
1 TABLET ORAL DAILY
Qty: 90 TABLET | Refills: 3 | OUTPATIENT
Start: 2019-06-04

## 2019-06-10 RX ORDER — ALLOPURINOL 300 MG/1
300 TABLET ORAL DAILY
Qty: 90 TABLET | Refills: 3 | Status: SHIPPED | OUTPATIENT
Start: 2019-06-10 | End: 2019-12-08 | Stop reason: SDUPTHER

## 2019-06-10 RX ORDER — ALLOPURINOL 300 MG/1
TABLET ORAL
Qty: 90 TABLET | Refills: 3 | OUTPATIENT
Start: 2019-06-10

## 2019-08-23 DIAGNOSIS — E78.5 HYPERLIPIDEMIA, UNSPECIFIED HYPERLIPIDEMIA TYPE: Primary | ICD-10-CM

## 2019-08-23 DIAGNOSIS — R79.89 ELEVATED LIVER FUNCTION TESTS: ICD-10-CM

## 2019-08-23 DIAGNOSIS — I10 ESSENTIAL HYPERTENSION: ICD-10-CM

## 2019-08-26 ENCOUNTER — LAB (OUTPATIENT)
Dept: FAMILY MEDICINE CLINIC | Facility: CLINIC | Age: 59
End: 2019-08-26

## 2019-08-26 DIAGNOSIS — E78.5 HYPERLIPIDEMIA, UNSPECIFIED HYPERLIPIDEMIA TYPE: ICD-10-CM

## 2019-08-26 DIAGNOSIS — R79.89 ELEVATED LIVER FUNCTION TESTS: ICD-10-CM

## 2019-08-26 DIAGNOSIS — I10 ESSENTIAL HYPERTENSION: ICD-10-CM

## 2019-08-27 LAB
ALBUMIN SERPL-MCNC: 4.9 G/DL (ref 3.5–5.2)
ALBUMIN/GLOB SERPL: 2 G/DL
ALP SERPL-CCNC: 56 U/L (ref 39–117)
ALT SERPL-CCNC: 64 U/L (ref 1–41)
AST SERPL-CCNC: 37 U/L (ref 1–40)
BILIRUB SERPL-MCNC: 0.6 MG/DL (ref 0.2–1.2)
BUN SERPL-MCNC: 17 MG/DL (ref 6–20)
BUN/CREAT SERPL: 12.4 (ref 7–25)
CALCIUM SERPL-MCNC: 9.4 MG/DL (ref 8.6–10.5)
CHLORIDE SERPL-SCNC: 100 MMOL/L (ref 98–107)
CHOLEST SERPL-MCNC: 137 MG/DL (ref 0–200)
CO2 SERPL-SCNC: 26.2 MMOL/L (ref 22–29)
CREAT SERPL-MCNC: 1.37 MG/DL (ref 0.76–1.27)
GLOBULIN SER CALC-MCNC: 2.4 GM/DL
GLUCOSE SERPL-MCNC: 99 MG/DL (ref 65–99)
HDLC SERPL-MCNC: 36 MG/DL (ref 40–60)
LDLC SERPL CALC-MCNC: 66 MG/DL (ref 0–100)
LDLC/HDLC SERPL: 1.83 {RATIO}
POTASSIUM SERPL-SCNC: 4.2 MMOL/L (ref 3.5–5.2)
PROT SERPL-MCNC: 7.3 G/DL (ref 6–8.5)
SODIUM SERPL-SCNC: 144 MMOL/L (ref 136–145)
TRIGL SERPL-MCNC: 175 MG/DL (ref 0–150)
VLDLC SERPL CALC-MCNC: 35 MG/DL

## 2019-08-29 ENCOUNTER — OFFICE VISIT (OUTPATIENT)
Dept: FAMILY MEDICINE CLINIC | Facility: CLINIC | Age: 59
End: 2019-08-29

## 2019-08-29 VITALS
HEIGHT: 76 IN | SYSTOLIC BLOOD PRESSURE: 136 MMHG | WEIGHT: 296 LBS | DIASTOLIC BLOOD PRESSURE: 84 MMHG | OXYGEN SATURATION: 94 % | HEART RATE: 83 BPM | BODY MASS INDEX: 36.04 KG/M2

## 2019-08-29 DIAGNOSIS — E78.2 MIXED HYPERLIPIDEMIA: ICD-10-CM

## 2019-08-29 DIAGNOSIS — R79.89 ELEVATED LIVER FUNCTION TESTS: ICD-10-CM

## 2019-08-29 DIAGNOSIS — E66.09 OBESITY DUE TO EXCESS CALORIES, UNSPECIFIED CLASSIFICATION, UNSPECIFIED WHETHER SERIOUS COMORBIDITY PRESENT: ICD-10-CM

## 2019-08-29 DIAGNOSIS — R79.89 ELEVATED SERUM CREATININE: ICD-10-CM

## 2019-08-29 DIAGNOSIS — I10 ESSENTIAL HYPERTENSION: Primary | ICD-10-CM

## 2019-08-29 PROCEDURE — 99214 OFFICE O/P EST MOD 30 MIN: CPT | Performed by: NURSE PRACTITIONER

## 2019-08-29 NOTE — PATIENT INSTRUCTIONS
Discharge instructions  Continue dietary changes as discussed to improve your liver function    If not resolved after next visit would advise you to see gastroenterology as well as ultrasound    Outpatient BMP urinalysis in 1 month  64 ounces of water daily    Avoid Aleve naproxen for now Tylenol okay  Check blood pressure weekly should average less than 140/90 preferably less 130/80

## 2019-08-29 NOTE — PROGRESS NOTES
Subjective   Jake Saunders is a 59 y.o. male.     Pleasant patient here for follow-up hypertension essential is been controlled he is compliant with his medication    Hyperlipidemia mixed, takes does not take a statin he is trying to improve his diet    Gout, stable allopurinol no problem    Recent elevated creatinine 3.7 GFR upper 50s  As no obstruction symptoms no fever no flank pain no NSAID abuse takes Aleve every couple weeks  No history of renal insufficiency his blood pressures been controlled he believes  No recent increased work out the gym or dehydration    Obesity, trying to improve his diet as well as he has chronically elevated ALT likely some fatty liver problems  Hepatitis B hepatitis C negative ferritin normal  He declined a liver ultrasound I do recommend he get one as well as see gastroenterology he wants to work on his diet first I think this is reasonable    Social history  No alcohol abuse no tobacco abuse      Hypertension   Pertinent negatives include no chest pain, palpitations or shortness of breath.        The following portions of the patient's history were reviewed and updated as appropriate: allergies, current medications, past family history, past medical history, past social history, past surgical history and problem list.    Review of Systems   Constitutional: Negative for fatigue and fever.   HENT: Negative.  Negative for trouble swallowing.    Eyes: Negative.    Respiratory: Negative.  Negative for cough and shortness of breath.    Cardiovascular: Negative for chest pain, palpitations and leg swelling.   Gastrointestinal: Negative.  Negative for abdominal pain.   Genitourinary: Negative.    Musculoskeletal: Negative.    Skin: Negative.    Neurological: Negative.  Negative for dizziness and confusion.   Psychiatric/Behavioral: Negative.        Objective   Physical Exam   Constitutional: He is oriented to person, place, and time. He appears well-developed and well-nourished. No  distress.   HENT:   Head: Normocephalic and atraumatic.   Nose: Nose normal.   Mouth/Throat: Oropharynx is clear and moist.   Eyes: Conjunctivae are normal. Pupils are equal, round, and reactive to light.   Neck: Neck supple. No JVD present.   Cardiovascular: Normal rate, regular rhythm and normal heart sounds.   No murmur heard.  Pulmonary/Chest: Effort normal and breath sounds normal. No respiratory distress. He has no wheezes.   Abdominal: Soft. Bowel sounds are normal. He exhibits no distension and no mass. There is no tenderness. There is no guarding. No hernia.   No hepatosplenomegaly   Musculoskeletal: He exhibits no edema or tenderness.   Lymphadenopathy:     He has no cervical adenopathy.   Neurological: He is alert and oriented to person, place, and time.   Skin: Skin is warm and dry. He is not diaphoretic.   Psychiatric: He has a normal mood and affect. His behavior is normal. Judgment and thought content normal.   Vitals reviewed.        Assessment/Plan   Jake was seen today for hypertension.    Diagnoses and all orders for this visit:    Essential hypertension  -     Basic Metabolic Panel; Future  -     Urinalysis With Microscopic If Indicated (No Culture) - Urine, Clean Catch; Future    Elevated serum creatinine  -     Basic Metabolic Panel; Future  -     Urinalysis With Microscopic If Indicated (No Culture) - Urine, Clean Catch; Future    Elevated liver function tests    Mixed hyperlipidemia    Obesity due to excess calories, unspecified classification, unspecified whether serious comorbidity present      Continue present medications  Check blood pressure weekly therapeutic lifestyle changes to better control blood pressure  Should be less than 140/90 preferably less than 130/80   Dietary changes as discussed  Risk of fatty liver which is numbers are suspicious for  He may have some sleep apnea as well we discussed this he would like to pursue his diet first  Discussed risk of fatty liver as well  as need for work-up to further evaluate his isolated liver function test abnormality    We will recheck his liver function test for now avoid any NSAIDs  Follow instructions below  2000 chrystal a day mostly chicken fish vegetables fiber decrease breads pasta            Patient Instructions   Discharge instructions  Continue dietary changes as discussed to improve your liver function    If not resolved after next visit would advise you to see gastroenterology as well as ultrasound    Outpatient BMP urinalysis in 1 month  64 ounces of water daily    Avoid Aleve naproxen for now Tylenol okay  Check blood pressure weekly should average less than 140/90 preferably less 130/80

## 2019-09-24 ENCOUNTER — RESULTS ENCOUNTER (OUTPATIENT)
Dept: FAMILY MEDICINE CLINIC | Facility: CLINIC | Age: 59
End: 2019-09-24

## 2019-09-24 DIAGNOSIS — R79.89 ELEVATED SERUM CREATININE: ICD-10-CM

## 2019-09-24 DIAGNOSIS — I10 ESSENTIAL HYPERTENSION: ICD-10-CM

## 2019-09-26 ENCOUNTER — TELEPHONE (OUTPATIENT)
Dept: FAMILY MEDICINE CLINIC | Facility: CLINIC | Age: 59
End: 2019-09-26

## 2019-09-26 NOTE — TELEPHONE ENCOUNTER
Regarding: FW: Non-Urgent Medical Question  Contact: [phone]      ----- Message -----  From: Jake Saunders  Sent: 9/26/2019   8:17 AM  To: [facility]  Subject: Non-Urgent Medical Question                      ----- Message from Mychart, Generic sent at 9/26/2019  8:17 AM EDT -----    Good morning,   I had a really bad cramp in my right leg, inner thigh, Sunday, which I will have often (leg cramps),  I think usually due to dehydration.  Later that day I could feel a tightness and in my inner thigh along with a knotted area that  could feel when I would rub my inner thigh.  Last night I noticed that I have a very large bruised area in my inner thigh.  Is this normal?  Should I be concerned?    Thank you, Leandro Chip [phone]

## 2019-09-26 NOTE — TELEPHONE ENCOUNTER
Call returned to patient. He will keep and eye on it. If it get worst he will make appointment to be seen .

## 2019-09-26 NOTE — TELEPHONE ENCOUNTER
Over the phone unfortunately I cannot really tell what is going on with him see if he wants to make an appointment or go to urgent care  Thank you

## 2019-10-15 ENCOUNTER — LAB (OUTPATIENT)
Dept: FAMILY MEDICINE CLINIC | Facility: CLINIC | Age: 59
End: 2019-10-15

## 2019-10-15 DIAGNOSIS — R79.89 ELEVATED SERUM CREATININE: ICD-10-CM

## 2019-10-15 DIAGNOSIS — I10 ESSENTIAL HYPERTENSION: ICD-10-CM

## 2019-10-15 LAB
APPEARANCE UR: CLEAR
BILIRUB UR QL STRIP: NEGATIVE
BUN SERPL-MCNC: 21 MG/DL (ref 6–20)
BUN/CREAT SERPL: 18.4 (ref 7–25)
CALCIUM SERPL-MCNC: 9.8 MG/DL (ref 8.6–10.5)
CHLORIDE SERPL-SCNC: 97 MMOL/L (ref 98–107)
CO2 SERPL-SCNC: 27.6 MMOL/L (ref 22–29)
COLOR UR: YELLOW
CREAT SERPL-MCNC: 1.14 MG/DL (ref 0.76–1.27)
GLUCOSE SERPL-MCNC: 88 MG/DL (ref 65–99)
GLUCOSE UR QL: NEGATIVE
HGB UR QL STRIP: NEGATIVE
KETONES UR QL STRIP: NEGATIVE
LEUKOCYTE ESTERASE UR QL STRIP: NEGATIVE
NITRITE UR QL STRIP: NEGATIVE
PH UR STRIP: 5.5 [PH] (ref 5–8)
POTASSIUM SERPL-SCNC: 4 MMOL/L (ref 3.5–5.2)
PROT UR QL STRIP: NEGATIVE
SODIUM SERPL-SCNC: 139 MMOL/L (ref 136–145)
SP GR UR: 1.02 (ref 1–1.03)
UROBILINOGEN UR STRIP-MCNC: NORMAL MG/DL

## 2019-12-09 RX ORDER — ALLOPURINOL 300 MG/1
300 TABLET ORAL DAILY
Qty: 90 TABLET | Refills: 3 | Status: SHIPPED | OUTPATIENT
Start: 2019-12-09 | End: 2021-01-01

## 2019-12-10 RX ORDER — CETIRIZINE HYDROCHLORIDE 10 MG/1
TABLET ORAL
Qty: 90 TABLET | Refills: 3 | OUTPATIENT
Start: 2019-12-10

## 2019-12-10 RX ORDER — OLMESARTAN MEDOXOMIL, AMLODIPINE AND HYDROCHLOROTHIAZIDE TABLET 40/5/25 MG 40; 5; 25 MG/1; MG/1; MG/1
1 TABLET ORAL DAILY
Qty: 90 TABLET | Refills: 3 | OUTPATIENT
Start: 2019-12-10

## 2019-12-11 RX ORDER — OLMESARTAN MEDOXOMIL, AMLODIPINE AND HYDROCHLOROTHIAZIDE TABLET 40/5/25 MG 40; 5; 25 MG/1; MG/1; MG/1
TABLET ORAL
Qty: 90 TABLET | Refills: 1 | Status: SHIPPED | OUTPATIENT
Start: 2019-12-11 | End: 2020-03-08 | Stop reason: SDUPTHER

## 2019-12-11 RX ORDER — CETIRIZINE HYDROCHLORIDE 10 MG/1
TABLET ORAL
Qty: 90 TABLET | Refills: 1 | Status: SHIPPED | OUTPATIENT
Start: 2019-12-11 | End: 2020-03-08 | Stop reason: SDUPTHER

## 2020-01-01 ENCOUNTER — APPOINTMENT (OUTPATIENT)
Dept: GENERAL RADIOLOGY | Facility: HOSPITAL | Age: 60
End: 2020-01-01

## 2020-01-01 ENCOUNTER — HOSPITAL ENCOUNTER (OUTPATIENT)
Dept: PET IMAGING | Facility: HOSPITAL | Age: 60
End: 2020-01-01

## 2020-01-01 ENCOUNTER — TELEPHONE (OUTPATIENT)
Dept: ONCOLOGY | Facility: CLINIC | Age: 60
End: 2020-01-01

## 2020-01-01 ENCOUNTER — INFUSION (OUTPATIENT)
Dept: ONCOLOGY | Facility: HOSPITAL | Age: 60
End: 2020-01-01

## 2020-01-01 ENCOUNTER — APPOINTMENT (OUTPATIENT)
Dept: ONCOLOGY | Facility: HOSPITAL | Age: 60
End: 2020-01-01

## 2020-01-01 ENCOUNTER — DOCUMENTATION (OUTPATIENT)
Dept: ONCOLOGY | Facility: HOSPITAL | Age: 60
End: 2020-01-01

## 2020-01-01 ENCOUNTER — TELEMEDICINE (OUTPATIENT)
Dept: ONCOLOGY | Facility: CLINIC | Age: 60
End: 2020-01-01

## 2020-01-01 ENCOUNTER — HOSPITAL ENCOUNTER (OUTPATIENT)
Facility: HOSPITAL | Age: 60
Setting detail: HOSPITAL OUTPATIENT SURGERY
Discharge: HOME | End: 2020-11-16
Attending: SURGERY | Admitting: SURGERY

## 2020-01-01 ENCOUNTER — APPOINTMENT (OUTPATIENT)
Dept: LAB | Facility: HOSPITAL | Age: 60
End: 2020-01-01

## 2020-01-01 ENCOUNTER — HOSPITAL ENCOUNTER (OUTPATIENT)
Dept: CARDIOLOGY | Facility: HOSPITAL | Age: 60
Discharge: HOME | End: 2020-11-12
Admitting: INTERNAL MEDICINE

## 2020-01-01 ENCOUNTER — APPOINTMENT (OUTPATIENT)
Dept: PET IMAGING | Facility: HOSPITAL | Age: 60
End: 2020-01-01

## 2020-01-01 ENCOUNTER — TRANSCRIBE ORDERS (OUTPATIENT)
Dept: SLEEP MEDICINE | Facility: HOSPITAL | Age: 60
End: 2020-01-01

## 2020-01-01 ENCOUNTER — OFFICE VISIT (OUTPATIENT)
Dept: ONCOLOGY | Facility: CLINIC | Age: 60
End: 2020-01-01

## 2020-01-01 ENCOUNTER — HOSPITAL ENCOUNTER (OUTPATIENT)
Dept: PET IMAGING | Facility: HOSPITAL | Age: 60
Discharge: HOME | End: 2020-12-23
Admitting: NURSE PRACTITIONER

## 2020-01-01 ENCOUNTER — ANESTHESIA EVENT (OUTPATIENT)
Dept: PERIOP | Facility: HOSPITAL | Age: 60
End: 2020-01-01

## 2020-01-01 ENCOUNTER — LAB (OUTPATIENT)
Dept: LAB | Facility: HOSPITAL | Age: 60
End: 2020-01-01

## 2020-01-01 ENCOUNTER — TELEPHONE (OUTPATIENT)
Dept: ONCOLOGY | Facility: HOSPITAL | Age: 60
End: 2020-01-01

## 2020-01-01 ENCOUNTER — CLINICAL SUPPORT (OUTPATIENT)
Dept: ONCOLOGY | Facility: HOSPITAL | Age: 60
End: 2020-01-01

## 2020-01-01 ENCOUNTER — ANESTHESIA (OUTPATIENT)
Dept: PERIOP | Facility: HOSPITAL | Age: 60
End: 2020-01-01

## 2020-01-01 VITALS — DIASTOLIC BLOOD PRESSURE: 72 MMHG | SYSTOLIC BLOOD PRESSURE: 105 MMHG | HEART RATE: 67 BPM

## 2020-01-01 VITALS
WEIGHT: 259.7 LBS | HEART RATE: 86 BPM | BODY MASS INDEX: 31.63 KG/M2 | SYSTOLIC BLOOD PRESSURE: 107 MMHG | DIASTOLIC BLOOD PRESSURE: 96 MMHG | RESPIRATION RATE: 16 BRPM | TEMPERATURE: 98.3 F | OXYGEN SATURATION: 94 % | HEIGHT: 76 IN

## 2020-01-01 VITALS
RESPIRATION RATE: 16 BRPM | SYSTOLIC BLOOD PRESSURE: 129 MMHG | TEMPERATURE: 96.9 F | DIASTOLIC BLOOD PRESSURE: 76 MMHG | BODY MASS INDEX: 31.75 KG/M2 | WEIGHT: 260.8 LBS | OXYGEN SATURATION: 94 % | HEART RATE: 86 BPM

## 2020-01-01 VITALS
HEART RATE: 98 BPM | DIASTOLIC BLOOD PRESSURE: 71 MMHG | HEIGHT: 76 IN | WEIGHT: 256.2 LBS | RESPIRATION RATE: 19 BRPM | BODY MASS INDEX: 31.2 KG/M2 | TEMPERATURE: 97.3 F | SYSTOLIC BLOOD PRESSURE: 101 MMHG | OXYGEN SATURATION: 97 %

## 2020-01-01 VITALS
WEIGHT: 264.2 LBS | DIASTOLIC BLOOD PRESSURE: 68 MMHG | RESPIRATION RATE: 16 BRPM | HEART RATE: 84 BPM | BODY MASS INDEX: 32.16 KG/M2 | TEMPERATURE: 96.8 F | OXYGEN SATURATION: 95 % | SYSTOLIC BLOOD PRESSURE: 108 MMHG

## 2020-01-01 VITALS
DIASTOLIC BLOOD PRESSURE: 70 MMHG | RESPIRATION RATE: 20 BRPM | OXYGEN SATURATION: 96 % | WEIGHT: 261.7 LBS | BODY MASS INDEX: 31.87 KG/M2 | TEMPERATURE: 98 F | HEIGHT: 76 IN | HEART RATE: 101 BPM | SYSTOLIC BLOOD PRESSURE: 114 MMHG

## 2020-01-01 VITALS — DIASTOLIC BLOOD PRESSURE: 59 MMHG | HEART RATE: 71 BPM | SYSTOLIC BLOOD PRESSURE: 91 MMHG

## 2020-01-01 VITALS
SYSTOLIC BLOOD PRESSURE: 102 MMHG | TEMPERATURE: 97.1 F | WEIGHT: 252.1 LBS | DIASTOLIC BLOOD PRESSURE: 66 MMHG | HEIGHT: 76 IN | RESPIRATION RATE: 20 BRPM | OXYGEN SATURATION: 97 % | HEART RATE: 90 BPM | BODY MASS INDEX: 30.7 KG/M2

## 2020-01-01 VITALS
BODY MASS INDEX: 31.53 KG/M2 | HEART RATE: 89 BPM | WEIGHT: 259 LBS | SYSTOLIC BLOOD PRESSURE: 119 MMHG | OXYGEN SATURATION: 94 % | TEMPERATURE: 97.5 F | RESPIRATION RATE: 16 BRPM | DIASTOLIC BLOOD PRESSURE: 74 MMHG

## 2020-01-01 DIAGNOSIS — Z01.818 OTHER SPECIFIED PRE-OPERATIVE EXAMINATION: Primary | ICD-10-CM

## 2020-01-01 DIAGNOSIS — E78.2 MIXED HYPERLIPIDEMIA: Primary | ICD-10-CM

## 2020-01-01 DIAGNOSIS — C82.28 GRADE 3 FOLLICULAR LYMPHOMA OF LYMPH NODES OF MULTIPLE REGIONS (HCC): Primary | ICD-10-CM

## 2020-01-01 DIAGNOSIS — R19.02 LEFT UPPER QUADRANT ABDOMINAL MASS: ICD-10-CM

## 2020-01-01 DIAGNOSIS — Z91.89 AT HIGH RISK OF TUMOR LYSIS SYNDROME: ICD-10-CM

## 2020-01-01 DIAGNOSIS — N18.1 STAGE 1 CHRONIC KIDNEY DISEASE: ICD-10-CM

## 2020-01-01 DIAGNOSIS — C82.28 GRADE 3 FOLLICULAR LYMPHOMA OF LYMPH NODES OF MULTIPLE REGIONS (HCC): ICD-10-CM

## 2020-01-01 DIAGNOSIS — Z01.818 OTHER SPECIFIED PRE-OPERATIVE EXAMINATION: ICD-10-CM

## 2020-01-01 DIAGNOSIS — G89.3 CANCER ASSOCIATED PAIN: ICD-10-CM

## 2020-01-01 LAB
ALBUMIN SERPL-MCNC: 4.3 G/DL (ref 3.5–5.2)
ALBUMIN SERPL-MCNC: 4.4 G/DL (ref 3.5–5.2)
ALBUMIN SERPL-MCNC: 4.4 G/DL (ref 3.5–5.2)
ALBUMIN SERPL-MCNC: 4.7 G/DL (ref 3.5–5.2)
ALBUMIN/GLOB SERPL: 1.4 G/DL (ref 1.1–2.4)
ALBUMIN/GLOB SERPL: 1.4 G/DL (ref 1.1–2.4)
ALBUMIN/GLOB SERPL: 1.5 G/DL (ref 1.1–2.4)
ALBUMIN/GLOB SERPL: 1.7 G/DL
ALP SERPL-CCNC: 56 U/L (ref 38–116)
ALP SERPL-CCNC: 58 U/L (ref 38–116)
ALP SERPL-CCNC: 65 U/L (ref 38–116)
ALP SERPL-CCNC: 66 U/L (ref 39–117)
ALT SERPL W P-5'-P-CCNC: 17 U/L (ref 0–41)
ALT SERPL W P-5'-P-CCNC: 17 U/L (ref 0–41)
ALT SERPL W P-5'-P-CCNC: 22 U/L (ref 0–41)
ALT SERPL W P-5'-P-CCNC: 30 U/L (ref 1–41)
ANION GAP SERPL CALCULATED.3IONS-SCNC: 10.1 MMOL/L (ref 5–15)
ANION GAP SERPL CALCULATED.3IONS-SCNC: 11 MMOL/L (ref 5–15)
ANION GAP SERPL CALCULATED.3IONS-SCNC: 12.3 MMOL/L (ref 5–15)
ANION GAP SERPL CALCULATED.3IONS-SCNC: 13 MMOL/L (ref 5–15)
ANION GAP SERPL CALCULATED.3IONS-SCNC: 13.2 MMOL/L (ref 5–15)
ANION GAP SERPL CALCULATED.3IONS-SCNC: 9.8 MMOL/L (ref 5–15)
AORTIC ARCH: 2.4 CM
ASCENDING AORTA: 2.8 CM
AST SERPL-CCNC: 17 U/L (ref 0–40)
AST SERPL-CCNC: 22 U/L (ref 0–40)
AST SERPL-CCNC: 28 U/L (ref 0–40)
AST SERPL-CCNC: 33 U/L (ref 1–40)
BASOPHILS # BLD AUTO: 0.03 10*3/MM3 (ref 0–0.2)
BASOPHILS # BLD AUTO: 0.03 10*3/MM3 (ref 0–0.2)
BASOPHILS # BLD AUTO: 0.06 10*3/MM3 (ref 0–0.2)
BASOPHILS # BLD AUTO: 0.07 10*3/MM3 (ref 0–0.2)
BASOPHILS # BLD AUTO: 0.07 10*3/MM3 (ref 0–0.2)
BASOPHILS # BLD AUTO: 0.08 10*3/MM3 (ref 0–0.2)
BASOPHILS # BLD AUTO: 0.1 10*3/MM3 (ref 0–0.2)
BASOPHILS NFR BLD AUTO: 0.2 % (ref 0–1.5)
BASOPHILS NFR BLD AUTO: 0.3 % (ref 0–1.5)
BASOPHILS NFR BLD AUTO: 0.7 % (ref 0–1.5)
BASOPHILS NFR BLD AUTO: 0.8 % (ref 0–1.5)
BASOPHILS NFR BLD AUTO: 1 % (ref 0–1.5)
BASOPHILS NFR BLD AUTO: 1.2 % (ref 0–1.5)
BASOPHILS NFR BLD AUTO: 1.2 % (ref 0–1.5)
BH CV ECHO MEAS - ACS: 2.9 CM
BH CV ECHO MEAS - AO MAX PG (FULL): 1.1 MMHG
BH CV ECHO MEAS - AO MAX PG: 6.9 MMHG
BH CV ECHO MEAS - AO MEAN PG (FULL): 0.72 MMHG
BH CV ECHO MEAS - AO MEAN PG: 4 MMHG
BH CV ECHO MEAS - AO ROOT AREA (BSA CORRECTED): 1.7
BH CV ECHO MEAS - AO ROOT AREA: 13.3 CM^2
BH CV ECHO MEAS - AO ROOT DIAM: 4.1 CM
BH CV ECHO MEAS - AO V2 MAX: 130.9 CM/SEC
BH CV ECHO MEAS - AO V2 MEAN: 95.3 CM/SEC
BH CV ECHO MEAS - AO V2 VTI: 25.4 CM
BH CV ECHO MEAS - ASC AORTA: 2.8 CM
BH CV ECHO MEAS - AVA(I,A): 3.2 CM^2
BH CV ECHO MEAS - AVA(I,D): 3.2 CM^2
BH CV ECHO MEAS - AVA(V,A): 3.5 CM^2
BH CV ECHO MEAS - AVA(V,D): 3.5 CM^2
BH CV ECHO MEAS - BSA(HAYCOCK): 2.5 M^2
BH CV ECHO MEAS - BSA: 2.5 M^2
BH CV ECHO MEAS - BZI_BMI: 31.8 KILOGRAMS/M^2
BH CV ECHO MEAS - BZI_METRIC_HEIGHT: 193 CM
BH CV ECHO MEAS - BZI_METRIC_WEIGHT: 118.4 KG
BH CV ECHO MEAS - EDV(MOD-SP2): 111 ML
BH CV ECHO MEAS - EDV(MOD-SP4): 84 ML
BH CV ECHO MEAS - EF(MOD-BP): 56.9 %
BH CV ECHO MEAS - EF(MOD-SP2): 60.4 %
BH CV ECHO MEAS - EF(MOD-SP4): 52.4 %
BH CV ECHO MEAS - ESV(MOD-SP2): 44 ML
BH CV ECHO MEAS - ESV(MOD-SP4): 40 ML
BH CV ECHO MEAS - LAT PEAK E' VEL: 9.2 CM/SEC
BH CV ECHO MEAS - LV DIASTOLIC VOL/BSA (35-75): 33.9 ML/M^2
BH CV ECHO MEAS - LV MAX PG: 5.7 MMHG
BH CV ECHO MEAS - LV MEAN PG: 3.3 MMHG
BH CV ECHO MEAS - LV SYSTOLIC VOL/BSA (12-30): 16.1 ML/M^2
BH CV ECHO MEAS - LV V1 MAX: 119.8 CM/SEC
BH CV ECHO MEAS - LV V1 MEAN: 86.1 CM/SEC
BH CV ECHO MEAS - LV V1 VTI: 21.4 CM
BH CV ECHO MEAS - LVLD AP2: 8.1 CM
BH CV ECHO MEAS - LVLD AP4: 7.1 CM
BH CV ECHO MEAS - LVLS AP2: 6.9 CM
BH CV ECHO MEAS - LVLS AP4: 5.7 CM
BH CV ECHO MEAS - LVOT AREA (M): 3.8 CM^2
BH CV ECHO MEAS - LVOT AREA: 3.8 CM^2
BH CV ECHO MEAS - LVOT DIAM: 2.2 CM
BH CV ECHO MEAS - MED PEAK E' VEL: 7.2 CM/SEC
BH CV ECHO MEAS - MV A DUR: 0.11 SEC
BH CV ECHO MEAS - MV A MAX VEL: 82.3 CM/SEC
BH CV ECHO MEAS - MV DEC SLOPE: 167.3 CM/SEC^2
BH CV ECHO MEAS - MV DEC TIME: 0.24 SEC
BH CV ECHO MEAS - MV E MAX VEL: 61.3 CM/SEC
BH CV ECHO MEAS - MV E/A: 0.75
BH CV ECHO MEAS - MV MAX PG: 2.4 MMHG
BH CV ECHO MEAS - MV MEAN PG: 1.4 MMHG
BH CV ECHO MEAS - MV P1/2T MAX VEL: 69.4 CM/SEC
BH CV ECHO MEAS - MV P1/2T: 121.4 MSEC
BH CV ECHO MEAS - MV V2 MAX: 77.8 CM/SEC
BH CV ECHO MEAS - MV V2 MEAN: 58.3 CM/SEC
BH CV ECHO MEAS - MV V2 VTI: 19.9 CM
BH CV ECHO MEAS - MVA P1/2T LCG: 3.2 CM^2
BH CV ECHO MEAS - MVA(P1/2T): 1.8 CM^2
BH CV ECHO MEAS - MVA(VTI): 4.1 CM^2
BH CV ECHO MEAS - PA MAX PG (FULL): 2.6 MMHG
BH CV ECHO MEAS - PA MAX PG: 4.3 MMHG
BH CV ECHO MEAS - PA V2 MAX: 103.2 CM/SEC
BH CV ECHO MEAS - PULM A REVS DUR: 0.1 SEC
BH CV ECHO MEAS - PULM A REVS VEL: 31.9 CM/SEC
BH CV ECHO MEAS - PULM DIAS VEL: 35.9 CM/SEC
BH CV ECHO MEAS - PULM S/D: 1.6
BH CV ECHO MEAS - PULM SYS VEL: 57.6 CM/SEC
BH CV ECHO MEAS - PVA(V,A): 1.9 CM^2
BH CV ECHO MEAS - PVA(V,D): 1.9 CM^2
BH CV ECHO MEAS - QP/QS: 0.47
BH CV ECHO MEAS - RV MAX PG: 1.6 MMHG
BH CV ECHO MEAS - RV MEAN PG: 1.1 MMHG
BH CV ECHO MEAS - RV V1 MAX: 63.4 CM/SEC
BH CV ECHO MEAS - RV V1 MEAN: 51 CM/SEC
BH CV ECHO MEAS - RV V1 VTI: 12.3 CM
BH CV ECHO MEAS - RVOT AREA: 3.2 CM^2
BH CV ECHO MEAS - RVOT DIAM: 2 CM
BH CV ECHO MEAS - SI(AO): 135.8 ML/M^2
BH CV ECHO MEAS - SI(LVOT): 33.1 ML/M^2
BH CV ECHO MEAS - SI(MOD-SP2): 27 ML/M^2
BH CV ECHO MEAS - SI(MOD-SP4): 17.7 ML/M^2
BH CV ECHO MEAS - SUP REN AO DIAM: 2.3 CM
BH CV ECHO MEAS - SV(AO): 336.9 ML
BH CV ECHO MEAS - SV(LVOT): 82.2 ML
BH CV ECHO MEAS - SV(MOD-SP2): 67 ML
BH CV ECHO MEAS - SV(MOD-SP4): 44 ML
BH CV ECHO MEAS - SV(RVOT): 38.9 ML
BH CV ECHO MEASUREMENTS AVERAGE E/E' RATIO: 7.48
BILIRUB SERPL-MCNC: 0.2 MG/DL (ref 0.2–1.2)
BILIRUB SERPL-MCNC: 0.3 MG/DL (ref 0.2–1.2)
BILIRUB SERPL-MCNC: 0.3 MG/DL (ref 0–1.2)
BILIRUB SERPL-MCNC: 0.6 MG/DL (ref 0.2–1.2)
BUN SERPL-MCNC: 15 MG/DL (ref 6–20)
BUN SERPL-MCNC: 17 MG/DL (ref 6–20)
BUN SERPL-MCNC: 19 MG/DL (ref 6–20)
BUN SERPL-MCNC: 25 MG/DL (ref 6–20)
BUN SERPL-MCNC: 36 MG/DL (ref 6–20)
BUN SERPL-MCNC: 36 MG/DL (ref 6–20)
BUN/CREAT SERPL: 13.2 (ref 7.3–30)
BUN/CREAT SERPL: 16.2 (ref 7–25)
BUN/CREAT SERPL: 17.3 (ref 7.3–30)
BUN/CREAT SERPL: 22.3 (ref 7.3–30)
BUN/CREAT SERPL: 31.3 (ref 7.3–30)
BUN/CREAT SERPL: 33 (ref 7.3–30)
CALCIUM SPEC-SCNC: 9.5 MG/DL (ref 8.5–10.2)
CALCIUM SPEC-SCNC: 9.6 MG/DL (ref 8.5–10.2)
CALCIUM SPEC-SCNC: 9.7 MG/DL (ref 8.5–10.2)
CALCIUM SPEC-SCNC: 9.8 MG/DL (ref 8.6–10.5)
CHLORIDE SERPL-SCNC: 101 MMOL/L (ref 98–107)
CHLORIDE SERPL-SCNC: 102 MMOL/L (ref 98–107)
CHLORIDE SERPL-SCNC: 89 MMOL/L (ref 98–107)
CHLORIDE SERPL-SCNC: 90 MMOL/L (ref 98–107)
CHLORIDE SERPL-SCNC: 95 MMOL/L (ref 98–107)
CHLORIDE SERPL-SCNC: 97 MMOL/L (ref 98–107)
CO2 SERPL-SCNC: 26.8 MMOL/L (ref 22–29)
CO2 SERPL-SCNC: 27.7 MMOL/L (ref 22–29)
CO2 SERPL-SCNC: 28 MMOL/L (ref 22–29)
CO2 SERPL-SCNC: 28.9 MMOL/L (ref 22–29)
CO2 SERPL-SCNC: 29 MMOL/L (ref 22–29)
CO2 SERPL-SCNC: 30.2 MMOL/L (ref 22–29)
CREAT BLDA-MCNC: 1.3 MG/DL (ref 0.6–1.3)
CREAT SERPL-MCNC: 1.05 MG/DL (ref 0.76–1.27)
CREAT SERPL-MCNC: 1.09 MG/DL (ref 0.7–1.3)
CREAT SERPL-MCNC: 1.1 MG/DL (ref 0.7–1.3)
CREAT SERPL-MCNC: 1.12 MG/DL (ref 0.7–1.3)
CREAT SERPL-MCNC: 1.14 MG/DL (ref 0.7–1.3)
CREAT SERPL-MCNC: 1.15 MG/DL (ref 0.7–1.3)
DEPRECATED RDW RBC AUTO: 40.6 FL (ref 37–54)
DEPRECATED RDW RBC AUTO: 41.1 FL (ref 37–54)
DEPRECATED RDW RBC AUTO: 41.5 FL (ref 37–54)
DEPRECATED RDW RBC AUTO: 41.5 FL (ref 37–54)
DEPRECATED RDW RBC AUTO: 43.6 FL (ref 37–54)
DEPRECATED RDW RBC AUTO: 44.9 FL (ref 37–54)
DEPRECATED RDW RBC AUTO: 48 FL (ref 37–54)
EOSINOPHIL # BLD AUTO: 0 10*3/MM3 (ref 0–0.4)
EOSINOPHIL # BLD AUTO: 0.04 10*3/MM3 (ref 0–0.4)
EOSINOPHIL # BLD AUTO: 0.07 10*3/MM3 (ref 0–0.4)
EOSINOPHIL # BLD AUTO: 0.07 10*3/MM3 (ref 0–0.4)
EOSINOPHIL # BLD AUTO: 0.19 10*3/MM3 (ref 0–0.4)
EOSINOPHIL # BLD AUTO: 0.49 10*3/MM3 (ref 0–0.4)
EOSINOPHIL # BLD AUTO: 0.64 10*3/MM3 (ref 0–0.4)
EOSINOPHIL NFR BLD AUTO: 0 % (ref 0.3–6.2)
EOSINOPHIL NFR BLD AUTO: 0.7 % (ref 0.3–6.2)
EOSINOPHIL NFR BLD AUTO: 1.1 % (ref 0.3–6.2)
EOSINOPHIL NFR BLD AUTO: 1.9 % (ref 0.3–6.2)
EOSINOPHIL NFR BLD AUTO: 2.4 % (ref 0.3–6.2)
EOSINOPHIL NFR BLD AUTO: 5.5 % (ref 0.3–6.2)
EOSINOPHIL NFR BLD AUTO: 7.6 % (ref 0.3–6.2)
ERYTHROCYTE [DISTWIDTH] IN BLOOD BY AUTOMATED COUNT: 12.6 % (ref 12.3–15.4)
ERYTHROCYTE [DISTWIDTH] IN BLOOD BY AUTOMATED COUNT: 12.7 % (ref 12.3–15.4)
ERYTHROCYTE [DISTWIDTH] IN BLOOD BY AUTOMATED COUNT: 12.8 % (ref 12.3–15.4)
ERYTHROCYTE [DISTWIDTH] IN BLOOD BY AUTOMATED COUNT: 13.4 % (ref 12.3–15.4)
ERYTHROCYTE [DISTWIDTH] IN BLOOD BY AUTOMATED COUNT: 13.4 % (ref 12.3–15.4)
ERYTHROCYTE [DISTWIDTH] IN BLOOD BY AUTOMATED COUNT: 13.6 % (ref 12.3–15.4)
ERYTHROCYTE [DISTWIDTH] IN BLOOD BY AUTOMATED COUNT: 15.5 % (ref 12.3–15.4)
GFR SERPL CREATININE-BSD FRML MDRD: 65 ML/MIN/1.73
GFR SERPL CREATININE-BSD FRML MDRD: 66 ML/MIN/1.73
GFR SERPL CREATININE-BSD FRML MDRD: 67 ML/MIN/1.73
GFR SERPL CREATININE-BSD FRML MDRD: 69 ML/MIN/1.73
GFR SERPL CREATININE-BSD FRML MDRD: 69 ML/MIN/1.73
GFR SERPL CREATININE-BSD FRML MDRD: 72 ML/MIN/1.73
GLOBULIN UR ELPH-MCNC: 2.8 GM/DL
GLOBULIN UR ELPH-MCNC: 3 GM/DL (ref 1.8–3.5)
GLOBULIN UR ELPH-MCNC: 3 GM/DL (ref 1.8–3.5)
GLOBULIN UR ELPH-MCNC: 3.2 GM/DL (ref 1.8–3.5)
GLUCOSE SERPL-MCNC: 104 MG/DL (ref 65–99)
GLUCOSE SERPL-MCNC: 113 MG/DL (ref 74–124)
GLUCOSE SERPL-MCNC: 125 MG/DL (ref 74–124)
GLUCOSE SERPL-MCNC: 126 MG/DL (ref 74–124)
GLUCOSE SERPL-MCNC: 128 MG/DL (ref 74–124)
GLUCOSE SERPL-MCNC: 135 MG/DL (ref 74–124)
HBV CORE AB SERPL QL IA: NEGATIVE
HBV SURFACE AB SER RIA-ACNC: NORMAL
HBV SURFACE AG SERPL QL IA: NORMAL
HCT VFR BLD AUTO: 34.1 % (ref 37.5–51)
HCT VFR BLD AUTO: 34.4 % (ref 37.5–51)
HCT VFR BLD AUTO: 35.5 % (ref 37.5–51)
HCT VFR BLD AUTO: 35.9 % (ref 37.5–51)
HCT VFR BLD AUTO: 38.7 % (ref 37.5–51)
HCT VFR BLD AUTO: 42.5 % (ref 37.5–51)
HCT VFR BLD AUTO: 42.5 % (ref 37.5–51)
HGB BLD-MCNC: 11.7 G/DL (ref 13–17.7)
HGB BLD-MCNC: 11.9 G/DL (ref 13–17.7)
HGB BLD-MCNC: 12.2 G/DL (ref 13–17.7)
HGB BLD-MCNC: 12.3 G/DL (ref 13–17.7)
HGB BLD-MCNC: 13.5 G/DL (ref 13–17.7)
HGB BLD-MCNC: 14 G/DL (ref 13–17.7)
HGB BLD-MCNC: 14.8 G/DL (ref 13–17.7)
IMM GRANULOCYTES # BLD AUTO: 0.03 10*3/MM3 (ref 0–0.05)
IMM GRANULOCYTES # BLD AUTO: 0.04 10*3/MM3 (ref 0–0.05)
IMM GRANULOCYTES # BLD AUTO: 0.05 10*3/MM3 (ref 0–0.05)
IMM GRANULOCYTES # BLD AUTO: 0.08 10*3/MM3 (ref 0–0.05)
IMM GRANULOCYTES # BLD AUTO: 0.09 10*3/MM3 (ref 0–0.05)
IMM GRANULOCYTES # BLD AUTO: 0.14 10*3/MM3 (ref 0–0.05)
IMM GRANULOCYTES # BLD AUTO: 1.77 10*3/MM3 (ref 0–0.05)
IMM GRANULOCYTES NFR BLD AUTO: 0.3 % (ref 0–0.5)
IMM GRANULOCYTES NFR BLD AUTO: 0.5 % (ref 0–0.5)
IMM GRANULOCYTES NFR BLD AUTO: 0.6 % (ref 0–0.5)
IMM GRANULOCYTES NFR BLD AUTO: 1.4 % (ref 0–0.5)
IMM GRANULOCYTES NFR BLD AUTO: 2.1 % (ref 0–0.5)
IMM GRANULOCYTES NFR BLD AUTO: 3.1 % (ref 0–0.5)
IMM GRANULOCYTES NFR BLD AUTO: 3.9 % (ref 0–0.5)
LDH SERPL-CCNC: 186 U/L (ref 99–259)
LDH SERPL-CCNC: 283 U/L (ref 99–259)
LDH SERPL-CCNC: 331 U/L (ref 99–259)
LDH SERPL-CCNC: 357 U/L (ref 135–225)
LV EF 2D ECHO EST: 57 %
LYMPHOCYTES # BLD AUTO: 0.09 10*3/MM3 (ref 0.7–3.1)
LYMPHOCYTES # BLD AUTO: 0.12 10*3/MM3 (ref 0.7–3.1)
LYMPHOCYTES # BLD AUTO: 0.21 10*3/MM3 (ref 0.7–3.1)
LYMPHOCYTES # BLD AUTO: 0.24 10*3/MM3 (ref 0.7–3.1)
LYMPHOCYTES # BLD AUTO: 0.29 10*3/MM3 (ref 0.7–3.1)
LYMPHOCYTES # BLD AUTO: 0.44 10*3/MM3 (ref 0.7–3.1)
LYMPHOCYTES # BLD AUTO: 0.48 10*3/MM3 (ref 0.7–3.1)
LYMPHOCYTES NFR BLD AUTO: 0.2 % (ref 19.6–45.3)
LYMPHOCYTES NFR BLD AUTO: 2.8 % (ref 19.6–45.3)
LYMPHOCYTES NFR BLD AUTO: 3.6 % (ref 19.6–45.3)
LYMPHOCYTES NFR BLD AUTO: 3.7 % (ref 19.6–45.3)
LYMPHOCYTES NFR BLD AUTO: 4.1 % (ref 19.6–45.3)
LYMPHOCYTES NFR BLD AUTO: 4.9 % (ref 19.6–45.3)
LYMPHOCYTES NFR BLD AUTO: 5.7 % (ref 19.6–45.3)
MAXIMAL PREDICTED HEART RATE: 161 BPM
MCH RBC QN AUTO: 30 PG (ref 26.6–33)
MCH RBC QN AUTO: 30.1 PG (ref 26.6–33)
MCH RBC QN AUTO: 30.3 PG (ref 26.6–33)
MCH RBC QN AUTO: 30.4 PG (ref 26.6–33)
MCH RBC QN AUTO: 30.7 PG (ref 26.6–33)
MCH RBC QN AUTO: 30.7 PG (ref 26.6–33)
MCH RBC QN AUTO: 30.8 PG (ref 26.6–33)
MCHC RBC AUTO-ENTMCNC: 32.9 G/DL (ref 31.5–35.7)
MCHC RBC AUTO-ENTMCNC: 34 G/DL (ref 31.5–35.7)
MCHC RBC AUTO-ENTMCNC: 34.3 G/DL (ref 31.5–35.7)
MCHC RBC AUTO-ENTMCNC: 34.6 G/DL (ref 31.5–35.7)
MCHC RBC AUTO-ENTMCNC: 34.6 G/DL (ref 31.5–35.7)
MCHC RBC AUTO-ENTMCNC: 34.8 G/DL (ref 31.5–35.7)
MCHC RBC AUTO-ENTMCNC: 34.9 G/DL (ref 31.5–35.7)
MCV RBC AUTO: 86.9 FL (ref 79–97)
MCV RBC AUTO: 87.2 FL (ref 79–97)
MCV RBC AUTO: 88.5 FL (ref 79–97)
MCV RBC AUTO: 88.5 FL (ref 79–97)
MCV RBC AUTO: 89.1 FL (ref 79–97)
MCV RBC AUTO: 89.5 FL (ref 79–97)
MCV RBC AUTO: 91 FL (ref 79–97)
MONOCYTES # BLD AUTO: 0.14 10*3/MM3 (ref 0.1–0.9)
MONOCYTES # BLD AUTO: 0.54 10*3/MM3 (ref 0.1–0.9)
MONOCYTES # BLD AUTO: 0.6 10*3/MM3 (ref 0.1–0.9)
MONOCYTES # BLD AUTO: 0.64 10*3/MM3 (ref 0.1–0.9)
MONOCYTES # BLD AUTO: 0.71 10*3/MM3 (ref 0.1–0.9)
MONOCYTES # BLD AUTO: 0.75 10*3/MM3 (ref 0.1–0.9)
MONOCYTES # BLD AUTO: 1.04 10*3/MM3 (ref 0.1–0.9)
MONOCYTES NFR BLD AUTO: 11.3 % (ref 5–12)
MONOCYTES NFR BLD AUTO: 11.4 % (ref 5–12)
MONOCYTES NFR BLD AUTO: 2.3 % (ref 5–12)
MONOCYTES NFR BLD AUTO: 4.7 % (ref 5–12)
MONOCYTES NFR BLD AUTO: 6.4 % (ref 5–12)
MONOCYTES NFR BLD AUTO: 6.7 % (ref 5–12)
MONOCYTES NFR BLD AUTO: 7 % (ref 5–12)
NEUTROPHILS NFR BLD AUTO: 2.5 10*3/MM3 (ref 1.7–7)
NEUTROPHILS NFR BLD AUTO: 4.64 10*3/MM3 (ref 1.7–7)
NEUTROPHILS NFR BLD AUTO: 41.86 10*3/MM3 (ref 1.7–7)
NEUTROPHILS NFR BLD AUTO: 5.32 10*3/MM3 (ref 1.7–7)
NEUTROPHILS NFR BLD AUTO: 6.7 10*3/MM3 (ref 1.7–7)
NEUTROPHILS NFR BLD AUTO: 7.25 10*3/MM3 (ref 1.7–7)
NEUTROPHILS NFR BLD AUTO: 79 % (ref 42.7–76)
NEUTROPHILS NFR BLD AUTO: 8.96 10*3/MM3 (ref 1.7–7)
NEUTROPHILS NFR BLD AUTO: 80.6 % (ref 42.7–76)
NEUTROPHILS NFR BLD AUTO: 81.6 % (ref 42.7–76)
NEUTROPHILS NFR BLD AUTO: 81.7 % (ref 42.7–76)
NEUTROPHILS NFR BLD AUTO: 84.7 % (ref 42.7–76)
NEUTROPHILS NFR BLD AUTO: 87.7 % (ref 42.7–76)
NEUTROPHILS NFR BLD AUTO: 93.4 % (ref 42.7–76)
NRBC BLD AUTO-RTO: 0 /100 WBC (ref 0–0.2)
PHOSPHATE SERPL-MCNC: 2.8 MG/DL (ref 2.5–4.5)
PHOSPHATE SERPL-MCNC: 4.1 MG/DL (ref 2.5–4.5)
PHOSPHATE SERPL-MCNC: 4.4 MG/DL (ref 2.5–4.5)
PLATELET # BLD AUTO: 177 10*3/MM3 (ref 140–450)
PLATELET # BLD AUTO: 277 10*3/MM3 (ref 140–450)
PLATELET # BLD AUTO: 286 10*3/MM3 (ref 140–450)
PLATELET # BLD AUTO: 286 10*3/MM3 (ref 140–450)
PLATELET # BLD AUTO: 289 10*3/MM3 (ref 140–450)
PLATELET # BLD AUTO: 291 10*3/MM3 (ref 140–450)
PLATELET # BLD AUTO: 314 10*3/MM3 (ref 140–450)
PMV BLD AUTO: 10 FL (ref 6–12)
PMV BLD AUTO: 10.3 FL (ref 6–12)
PMV BLD AUTO: 10.6 FL (ref 6–12)
PMV BLD AUTO: 10.9 FL (ref 6–12)
PMV BLD AUTO: 9.8 FL (ref 6–12)
PMV BLD AUTO: 9.9 FL (ref 6–12)
PMV BLD AUTO: 9.9 FL (ref 6–12)
POTASSIUM SERPL-SCNC: 3.2 MMOL/L (ref 3.5–4.7)
POTASSIUM SERPL-SCNC: 3.6 MMOL/L (ref 3.5–4.7)
POTASSIUM SERPL-SCNC: 3.8 MMOL/L (ref 3.5–4.7)
POTASSIUM SERPL-SCNC: 3.8 MMOL/L (ref 3.5–5.2)
POTASSIUM SERPL-SCNC: 3.9 MMOL/L (ref 3.5–4.7)
POTASSIUM SERPL-SCNC: 4.2 MMOL/L (ref 3.5–4.7)
PROT SERPL-MCNC: 7.3 G/DL (ref 6.3–8)
PROT SERPL-MCNC: 7.4 G/DL (ref 6.3–8)
PROT SERPL-MCNC: 7.5 G/DL (ref 6–8.5)
PROT SERPL-MCNC: 7.6 G/DL (ref 6.3–8)
RBC # BLD AUTO: 3.81 10*6/MM3 (ref 4.14–5.8)
RBC # BLD AUTO: 3.96 10*6/MM3 (ref 4.14–5.8)
RBC # BLD AUTO: 4.01 10*6/MM3 (ref 4.14–5.8)
RBC # BLD AUTO: 4.03 10*6/MM3 (ref 4.14–5.8)
RBC # BLD AUTO: 4.44 10*6/MM3 (ref 4.14–5.8)
RBC # BLD AUTO: 4.67 10*6/MM3 (ref 4.14–5.8)
RBC # BLD AUTO: 4.8 10*6/MM3 (ref 4.14–5.8)
REF LAB TEST METHOD: NORMAL
SARS-COV-2 RNA RESP QL NAA+PROBE: NOT DETECTED
SINUS: 3.5 CM
SODIUM SERPL-SCNC: 130 MMOL/L (ref 134–145)
SODIUM SERPL-SCNC: 130 MMOL/L (ref 134–145)
SODIUM SERPL-SCNC: 135 MMOL/L (ref 134–145)
SODIUM SERPL-SCNC: 137 MMOL/L (ref 136–145)
SODIUM SERPL-SCNC: 140 MMOL/L (ref 134–145)
SODIUM SERPL-SCNC: 142 MMOL/L (ref 134–145)
STJ: 2.9 CM
STRESS TARGET HR: 137 BPM
URATE SERPL-MCNC: 6.1 MG/DL (ref 2.8–7.4)
URATE SERPL-MCNC: 6.8 MG/DL (ref 2.8–7.4)
URATE SERPL-MCNC: 7.2 MG/DL (ref 2.8–7.4)
WBC # BLD AUTO: 10.21 10*3/MM3 (ref 3.4–10.8)
WBC # BLD AUTO: 2.95 10*3/MM3 (ref 3.4–10.8)
WBC # BLD AUTO: 44.86 10*3/MM3 (ref 3.4–10.8)
WBC # BLD AUTO: 5.68 10*3/MM3 (ref 3.4–10.8)
WBC # BLD AUTO: 6.6 10*3/MM3 (ref 3.4–10.8)
WBC # BLD AUTO: 8.47 10*3/MM3 (ref 3.4–10.8)
WBC # BLD AUTO: 8.89 10*3/MM3 (ref 3.4–10.8)

## 2020-01-01 PROCEDURE — 25010000002 PALONOSETRON PER 25 MCG: Performed by: INTERNAL MEDICINE

## 2020-01-01 PROCEDURE — 25010000003 CEFAZOLIN IN DEXTROSE 2-4 GM/100ML-% SOLUTION: Performed by: SURGERY

## 2020-01-01 PROCEDURE — 25010000002 DOXORUBICIN PER 10 MG: Performed by: INTERNAL MEDICINE

## 2020-01-01 PROCEDURE — 25010000002 VINCRISTINE PER 1 MG: Performed by: INTERNAL MEDICINE

## 2020-01-01 PROCEDURE — 0 DIATRIZOATE MEGLUMINE & SODIUM PER 1 ML: Performed by: NURSE PRACTITIONER

## 2020-01-01 PROCEDURE — 25010000002 RITUXIMAB 10 MG/ML SOLUTION 50 ML VIAL: Performed by: INTERNAL MEDICINE

## 2020-01-01 PROCEDURE — 96413 CHEMO IV INFUSION 1 HR: CPT

## 2020-01-01 PROCEDURE — 99214 OFFICE O/P EST MOD 30 MIN: CPT | Performed by: NURSE PRACTITIONER

## 2020-01-01 PROCEDURE — 96375 TX/PRO/DX INJ NEW DRUG ADDON: CPT

## 2020-01-01 PROCEDURE — 84100 ASSAY OF PHOSPHORUS: CPT

## 2020-01-01 PROCEDURE — 86706 HEP B SURFACE ANTIBODY: CPT | Performed by: INTERNAL MEDICINE

## 2020-01-01 PROCEDURE — 83615 LACTATE (LD) (LDH) ENZYME: CPT

## 2020-01-01 PROCEDURE — C1788 PORT, INDWELLING, IMP: HCPCS | Performed by: SURGERY

## 2020-01-01 PROCEDURE — 25010000002 CYCLOPHOSPHAMIDE PER 100 MG: Performed by: INTERNAL MEDICINE

## 2020-01-01 PROCEDURE — 85025 COMPLETE CBC W/AUTO DIFF WBC: CPT

## 2020-01-01 PROCEDURE — 96372 THER/PROPH/DIAG INJ SC/IM: CPT

## 2020-01-01 PROCEDURE — U0004 COV-19 TEST NON-CDC HGH THRU: HCPCS

## 2020-01-01 PROCEDURE — 36415 COLL VENOUS BLD VENIPUNCTURE: CPT

## 2020-01-01 PROCEDURE — 25010000003 HEPARIN LOCK FLUSH PER 10 UNITS: Performed by: INTERNAL MEDICINE

## 2020-01-01 PROCEDURE — 96367 TX/PROPH/DG ADDL SEQ IV INF: CPT

## 2020-01-01 PROCEDURE — 25010000002 DIPHENHYDRAMINE PER 50 MG: Performed by: INTERNAL MEDICINE

## 2020-01-01 PROCEDURE — 82565 ASSAY OF CREATININE: CPT

## 2020-01-01 PROCEDURE — 80053 COMPREHEN METABOLIC PANEL: CPT

## 2020-01-01 PROCEDURE — 96411 CHEMO IV PUSH ADDL DRUG: CPT

## 2020-01-01 PROCEDURE — 25010000002 DEXAMETHASONE SODIUM PHOSPHATE 100 MG/10ML SOLUTION: Performed by: INTERNAL MEDICINE

## 2020-01-01 PROCEDURE — 25010000002 FOSAPREPITANT PER 1 MG: Performed by: INTERNAL MEDICINE

## 2020-01-01 PROCEDURE — 71260 CT THORAX DX C+: CPT

## 2020-01-01 PROCEDURE — 25010000002 HEPARIN (PORCINE) PER 1000 UNITS: Performed by: SURGERY

## 2020-01-01 PROCEDURE — 25010000002 PEGFILGRASTIM 6 MG/0.6ML PREFILLED SYRINGE KIT: Performed by: INTERNAL MEDICINE

## 2020-01-01 PROCEDURE — 96417 CHEMO IV INFUS EACH ADDL SEQ: CPT

## 2020-01-01 PROCEDURE — 99215 OFFICE O/P EST HI 40 MIN: CPT | Performed by: INTERNAL MEDICINE

## 2020-01-01 PROCEDURE — 96415 CHEMO IV INFUSION ADDL HR: CPT

## 2020-01-01 PROCEDURE — 80048 BASIC METABOLIC PNL TOTAL CA: CPT

## 2020-01-01 PROCEDURE — 25010000002 IOPAMIDOL 61 % SOLUTION: Performed by: NURSE PRACTITIONER

## 2020-01-01 PROCEDURE — C9803 HOPD COVID-19 SPEC COLLECT: HCPCS

## 2020-01-01 PROCEDURE — 25010000002 PROPOFOL 10 MG/ML EMULSION: Performed by: NURSE ANESTHETIST, CERTIFIED REGISTERED

## 2020-01-01 PROCEDURE — 93306 TTE W/DOPPLER COMPLETE: CPT

## 2020-01-01 PROCEDURE — 93306 TTE W/DOPPLER COMPLETE: CPT | Performed by: INTERNAL MEDICINE

## 2020-01-01 PROCEDURE — 87340 HEPATITIS B SURFACE AG IA: CPT | Performed by: INTERNAL MEDICINE

## 2020-01-01 PROCEDURE — 96360 HYDRATION IV INFUSION INIT: CPT

## 2020-01-01 PROCEDURE — 84550 ASSAY OF BLOOD/URIC ACID: CPT

## 2020-01-01 PROCEDURE — 25010000002 PERFLUTREN (DEFINITY) 8.476 MG IN SODIUM CHLORIDE 0.9 % 10 ML INJECTION: Performed by: INTERNAL MEDICINE

## 2020-01-01 PROCEDURE — 96377 APPLICATON ON-BODY INJECTOR: CPT

## 2020-01-01 PROCEDURE — 76000 FLUOROSCOPY <1 HR PHYS/QHP: CPT

## 2020-01-01 PROCEDURE — 84100 ASSAY OF PHOSPHORUS: CPT | Performed by: INTERNAL MEDICINE

## 2020-01-01 PROCEDURE — 25010000002 PEGFILGRASTIM 6 MG/0.6ML SOLUTION PREFILLED SYRINGE: Performed by: INTERNAL MEDICINE

## 2020-01-01 PROCEDURE — 74177 CT ABD & PELVIS W/CONTRAST: CPT

## 2020-01-01 PROCEDURE — G0463 HOSPITAL OUTPT CLINIC VISIT: HCPCS

## 2020-01-01 PROCEDURE — 25010000003 LIDOCAINE 1 % SOLUTION 20 ML VIAL: Performed by: SURGERY

## 2020-01-01 DEVICE — POWERPORT M.R.I. IMPLANTABLE PORT WITH ATTACHABLE 9.6F OPEN-ENDED SINGLE-LUMEN VENOUS CATHETER INTERMEDIATE KIT (WITH SUTURE PLUGS)
Type: IMPLANTABLE DEVICE | Site: CHEST WALL | Status: FUNCTIONAL
Brand: POWERPORT M.R.I.

## 2020-01-01 RX ORDER — SODIUM CHLORIDE 0.9 % (FLUSH) 0.9 %
3-10 SYRINGE (ML) INJECTION AS NEEDED
Status: DISCONTINUED | OUTPATIENT
Start: 2020-01-01 | End: 2020-01-01 | Stop reason: HOSPADM

## 2020-01-01 RX ORDER — ACETAMINOPHEN 325 MG/1
650 TABLET ORAL ONCE
Status: COMPLETED | OUTPATIENT
Start: 2020-01-01 | End: 2020-01-01

## 2020-01-01 RX ORDER — FAMOTIDINE 10 MG/ML
20 INJECTION, SOLUTION INTRAVENOUS AS NEEDED
Status: CANCELLED | OUTPATIENT
Start: 2020-01-01

## 2020-01-01 RX ORDER — EPHEDRINE SULFATE 50 MG/ML
5 INJECTION, SOLUTION INTRAVENOUS ONCE AS NEEDED
Status: CANCELLED | OUTPATIENT
Start: 2020-01-01

## 2020-01-01 RX ORDER — PROMETHAZINE HYDROCHLORIDE 25 MG/1
25 TABLET ORAL ONCE AS NEEDED
Status: CANCELLED | OUTPATIENT
Start: 2020-01-01

## 2020-01-01 RX ORDER — SODIUM CHLORIDE 9 MG/ML
250 INJECTION, SOLUTION INTRAVENOUS ONCE
Status: COMPLETED | OUTPATIENT
Start: 2020-01-01 | End: 2020-01-01

## 2020-01-01 RX ORDER — MEPERIDINE HYDROCHLORIDE 25 MG/ML
25 INJECTION INTRAMUSCULAR; INTRAVENOUS; SUBCUTANEOUS
Status: CANCELLED | OUTPATIENT
Start: 2020-01-01

## 2020-01-01 RX ORDER — DOXORUBICIN HYDROCHLORIDE 2 MG/ML
50 INJECTION, SOLUTION INTRAVENOUS ONCE
Status: COMPLETED | OUTPATIENT
Start: 2020-01-01 | End: 2020-01-01

## 2020-01-01 RX ORDER — METOPROLOL SUCCINATE 50 MG/1
TABLET, EXTENDED RELEASE ORAL
Qty: 90 TABLET | Refills: 1 | Status: SHIPPED | OUTPATIENT
Start: 2020-01-01 | End: 2021-01-01

## 2020-01-01 RX ORDER — HEPARIN SODIUM (PORCINE) LOCK FLUSH IV SOLN 100 UNIT/ML 100 UNIT/ML
500 SOLUTION INTRAVENOUS AS NEEDED
Status: CANCELLED | OUTPATIENT
Start: 2020-01-01

## 2020-01-01 RX ORDER — FAMOTIDINE 10 MG/ML
20 INJECTION, SOLUTION INTRAVENOUS ONCE
Status: CANCELLED | OUTPATIENT
Start: 2020-01-01

## 2020-01-01 RX ORDER — PREDNISONE 50 MG/1
100 TABLET ORAL DAILY
Qty: 10 TABLET | Refills: 5 | Status: SHIPPED | OUTPATIENT
Start: 2020-01-01 | End: 2020-01-01

## 2020-01-01 RX ORDER — PALONOSETRON 0.05 MG/ML
0.25 INJECTION, SOLUTION INTRAVENOUS ONCE
Status: COMPLETED | OUTPATIENT
Start: 2020-01-01 | End: 2020-01-01

## 2020-01-01 RX ORDER — FENTANYL CITRATE 50 UG/ML
50 INJECTION, SOLUTION INTRAMUSCULAR; INTRAVENOUS
Status: DISCONTINUED | OUTPATIENT
Start: 2020-01-01 | End: 2020-01-01 | Stop reason: HOSPADM

## 2020-01-01 RX ORDER — SODIUM CHLORIDE 9 MG/ML
250 INJECTION, SOLUTION INTRAVENOUS ONCE
Status: CANCELLED | OUTPATIENT
Start: 2020-01-01

## 2020-01-01 RX ORDER — SODIUM CHLORIDE 0.9 % (FLUSH) 0.9 %
10 SYRINGE (ML) INJECTION AS NEEDED
Status: CANCELLED | OUTPATIENT
Start: 2020-01-01

## 2020-01-01 RX ORDER — NALOXONE HCL 0.4 MG/ML
0.2 VIAL (ML) INJECTION AS NEEDED
Status: CANCELLED | OUTPATIENT
Start: 2020-01-01

## 2020-01-01 RX ORDER — FAMOTIDINE 10 MG/ML
20 INJECTION, SOLUTION INTRAVENOUS ONCE
Status: COMPLETED | OUTPATIENT
Start: 2020-01-01 | End: 2020-01-01

## 2020-01-01 RX ORDER — DIPHENHYDRAMINE HCL 25 MG
25 CAPSULE ORAL
Status: CANCELLED | OUTPATIENT
Start: 2020-01-01

## 2020-01-01 RX ORDER — FLUMAZENIL 0.1 MG/ML
0.2 INJECTION INTRAVENOUS AS NEEDED
Status: CANCELLED | OUTPATIENT
Start: 2020-01-01

## 2020-01-01 RX ORDER — SODIUM CHLORIDE 0.9 % (FLUSH) 0.9 %
10 SYRINGE (ML) INJECTION AS NEEDED
Status: DISCONTINUED | OUTPATIENT
Start: 2020-01-01 | End: 2020-01-01 | Stop reason: HOSPADM

## 2020-01-01 RX ORDER — PROPOFOL 10 MG/ML
VIAL (ML) INTRAVENOUS CONTINUOUS PRN
Status: DISCONTINUED | OUTPATIENT
Start: 2020-01-01 | End: 2020-01-01 | Stop reason: SURG

## 2020-01-01 RX ORDER — SODIUM CHLORIDE 0.9 % (FLUSH) 0.9 %
3 SYRINGE (ML) INJECTION EVERY 12 HOURS SCHEDULED
Status: DISCONTINUED | OUTPATIENT
Start: 2020-01-01 | End: 2020-01-01 | Stop reason: HOSPADM

## 2020-01-01 RX ORDER — LIDOCAINE HYDROCHLORIDE 10 MG/ML
0.5 INJECTION, SOLUTION EPIDURAL; INFILTRATION; INTRACAUDAL; PERINEURAL ONCE AS NEEDED
Status: DISCONTINUED | OUTPATIENT
Start: 2020-01-01 | End: 2020-01-01 | Stop reason: HOSPADM

## 2020-01-01 RX ORDER — ALBUTEROL SULFATE 2.5 MG/3ML
2.5 SOLUTION RESPIRATORY (INHALATION) ONCE AS NEEDED
Status: CANCELLED | OUTPATIENT
Start: 2020-01-01

## 2020-01-01 RX ORDER — HYDROCODONE BITARTRATE AND ACETAMINOPHEN 7.5; 325 MG/1; MG/1
1 TABLET ORAL ONCE AS NEEDED
Status: CANCELLED | OUTPATIENT
Start: 2020-01-01

## 2020-01-01 RX ORDER — DIPHENHYDRAMINE HYDROCHLORIDE 50 MG/ML
50 INJECTION INTRAMUSCULAR; INTRAVENOUS AS NEEDED
Status: CANCELLED | OUTPATIENT
Start: 2020-01-01

## 2020-01-01 RX ORDER — LABETALOL HYDROCHLORIDE 5 MG/ML
5 INJECTION, SOLUTION INTRAVENOUS
Status: CANCELLED | OUTPATIENT
Start: 2020-01-01

## 2020-01-01 RX ORDER — SODIUM CHLORIDE 9 MG/ML
1000 INJECTION, SOLUTION INTRAVENOUS ONCE
Status: COMPLETED | OUTPATIENT
Start: 2020-01-01 | End: 2020-01-01

## 2020-01-01 RX ORDER — ACETAMINOPHEN 325 MG/1
650 TABLET ORAL ONCE
Status: CANCELLED | OUTPATIENT
Start: 2020-01-01

## 2020-01-01 RX ORDER — PREDNISONE 50 MG/1
TABLET ORAL
Qty: 10 TABLET | Refills: 5 | Status: SHIPPED | OUTPATIENT
Start: 2020-01-01 | End: 2021-01-01

## 2020-01-01 RX ORDER — DOXORUBICIN HYDROCHLORIDE 2 MG/ML
50 INJECTION, SOLUTION INTRAVENOUS ONCE
Status: CANCELLED | OUTPATIENT
Start: 2020-01-01

## 2020-01-01 RX ORDER — HEPARIN SODIUM (PORCINE) LOCK FLUSH IV SOLN 100 UNIT/ML 100 UNIT/ML
500 SOLUTION INTRAVENOUS AS NEEDED
Status: DISCONTINUED | OUTPATIENT
Start: 2020-01-01 | End: 2020-01-01 | Stop reason: HOSPADM

## 2020-01-01 RX ORDER — SODIUM CHLORIDE 9 MG/ML
1000 INJECTION, SOLUTION INTRAVENOUS ONCE
Status: CANCELLED
Start: 2020-01-01

## 2020-01-01 RX ORDER — PALONOSETRON 0.05 MG/ML
0.25 INJECTION, SOLUTION INTRAVENOUS ONCE
Status: CANCELLED | OUTPATIENT
Start: 2020-01-01

## 2020-01-01 RX ORDER — HEPARIN SODIUM 1000 [USP'U]/ML
INJECTION, SOLUTION INTRAVENOUS; SUBCUTANEOUS AS NEEDED
Status: DISCONTINUED | OUTPATIENT
Start: 2020-01-01 | End: 2020-01-01 | Stop reason: HOSPADM

## 2020-01-01 RX ORDER — ONDANSETRON 2 MG/ML
4 INJECTION INTRAMUSCULAR; INTRAVENOUS ONCE AS NEEDED
Status: CANCELLED | OUTPATIENT
Start: 2020-01-01

## 2020-01-01 RX ORDER — PROMETHAZINE HYDROCHLORIDE 25 MG/1
25 SUPPOSITORY RECTAL ONCE AS NEEDED
Status: CANCELLED | OUTPATIENT
Start: 2020-01-01

## 2020-01-01 RX ORDER — OLMESARTAN MEDOXOMIL, AMLODIPINE AND HYDROCHLOROTHIAZIDE TABLET 40/5/25 MG 40; 5; 25 MG/1; MG/1; MG/1
1 TABLET ORAL DAILY
Qty: 90 TABLET | Refills: 1 | Status: SHIPPED | OUTPATIENT
Start: 2020-01-01 | End: 2021-01-01

## 2020-01-01 RX ORDER — FENTANYL CITRATE 50 UG/ML
50 INJECTION, SOLUTION INTRAMUSCULAR; INTRAVENOUS
Status: CANCELLED | OUTPATIENT
Start: 2020-01-01

## 2020-01-01 RX ORDER — HYDROCODONE BITARTRATE AND ACETAMINOPHEN 5; 325 MG/1; MG/1
1 TABLET ORAL EVERY 6 HOURS PRN
Qty: 30 TABLET | Refills: 0 | Status: SHIPPED | OUTPATIENT
Start: 2020-01-01 | End: 2021-01-01

## 2020-01-01 RX ORDER — MIDAZOLAM HYDROCHLORIDE 1 MG/ML
1 INJECTION INTRAMUSCULAR; INTRAVENOUS
Status: DISCONTINUED | OUTPATIENT
Start: 2020-01-01 | End: 2020-01-01 | Stop reason: HOSPADM

## 2020-01-01 RX ORDER — HYDROMORPHONE HYDROCHLORIDE 1 MG/ML
0.5 INJECTION, SOLUTION INTRAMUSCULAR; INTRAVENOUS; SUBCUTANEOUS
Status: CANCELLED | OUTPATIENT
Start: 2020-01-01

## 2020-01-01 RX ORDER — OXYCODONE AND ACETAMINOPHEN 7.5; 325 MG/1; MG/1
1 TABLET ORAL ONCE AS NEEDED
Status: CANCELLED | OUTPATIENT
Start: 2020-01-01

## 2020-01-01 RX ORDER — PROPOFOL 10 MG/ML
VIAL (ML) INTRAVENOUS AS NEEDED
Status: DISCONTINUED | OUTPATIENT
Start: 2020-01-01 | End: 2020-01-01 | Stop reason: SURG

## 2020-01-01 RX ORDER — DIPHENHYDRAMINE HYDROCHLORIDE 50 MG/ML
12.5 INJECTION INTRAMUSCULAR; INTRAVENOUS
Status: CANCELLED | OUTPATIENT
Start: 2020-01-01

## 2020-01-01 RX ORDER — ONDANSETRON HYDROCHLORIDE 8 MG/1
8 TABLET, FILM COATED ORAL EVERY 8 HOURS PRN
Qty: 30 TABLET | Refills: 5 | Status: SHIPPED | OUTPATIENT
Start: 2020-01-01 | End: 2021-01-01

## 2020-01-01 RX ORDER — CEFAZOLIN SODIUM 2 G/100ML
2 INJECTION, SOLUTION INTRAVENOUS ONCE
Status: COMPLETED | OUTPATIENT
Start: 2020-01-01 | End: 2020-01-01

## 2020-01-01 RX ORDER — SODIUM CHLORIDE, SODIUM LACTATE, POTASSIUM CHLORIDE, CALCIUM CHLORIDE 600; 310; 30; 20 MG/100ML; MG/100ML; MG/100ML; MG/100ML
9 INJECTION, SOLUTION INTRAVENOUS CONTINUOUS
Status: DISCONTINUED | OUTPATIENT
Start: 2020-01-01 | End: 2020-01-01 | Stop reason: HOSPADM

## 2020-01-01 RX ORDER — LIDOCAINE HYDROCHLORIDE 20 MG/ML
INJECTION, SOLUTION INFILTRATION; PERINEURAL AS NEEDED
Status: DISCONTINUED | OUTPATIENT
Start: 2020-01-01 | End: 2020-01-01 | Stop reason: SURG

## 2020-01-01 RX ADMIN — PERFLUTREN 1.5 ML: 6.52 INJECTION, SUSPENSION INTRAVENOUS at 11:22

## 2020-01-01 RX ADMIN — SODIUM CHLORIDE 100 ML: 9 INJECTION, SOLUTION INTRAVENOUS at 09:02

## 2020-01-01 RX ADMIN — IOPAMIDOL 85 ML: 612 INJECTION, SOLUTION INTRAVENOUS at 10:03

## 2020-01-01 RX ADMIN — PALONOSETRON HYDROCHLORIDE 0.25 MG: 0.25 INJECTION, SOLUTION INTRAVENOUS at 13:23

## 2020-01-01 RX ADMIN — SODIUM CHLORIDE 100 ML: 9 INJECTION, SOLUTION INTRAVENOUS at 14:08

## 2020-01-01 RX ADMIN — DIPHENHYDRAMINE HYDROCHLORIDE 50 MG: 50 INJECTION, SOLUTION INTRAMUSCULAR; INTRAVENOUS at 09:34

## 2020-01-01 RX ADMIN — PROPOFOL 140 MG: 10 INJECTION, EMULSION INTRAVENOUS at 15:21

## 2020-01-01 RX ADMIN — SODIUM CHLORIDE 100 ML: 9 INJECTION, SOLUTION INTRAVENOUS at 13:25

## 2020-01-01 RX ADMIN — SODIUM CHLORIDE 1870 MG: 900 INJECTION, SOLUTION INTRAVENOUS at 14:40

## 2020-01-01 RX ADMIN — DIATRIZOATE MEGLUMINE AND DIATRIZOATE SODIUM 30 ML: 660; 100 LIQUID ORAL; RECTAL at 10:03

## 2020-01-01 RX ADMIN — RITUXIMAB 930 MG: 10 INJECTION, SOLUTION INTRAVENOUS at 09:49

## 2020-01-01 RX ADMIN — VINCRISTINE SULFATE 2 MG: 1 INJECTION, SOLUTION INTRAVENOUS at 14:26

## 2020-01-01 RX ADMIN — FAMOTIDINE 20 MG: 10 INJECTION, SOLUTION INTRAVENOUS at 08:58

## 2020-01-01 RX ADMIN — LIDOCAINE HYDROCHLORIDE 80 MG: 20 INJECTION, SOLUTION INFILTRATION; PERINEURAL at 15:19

## 2020-01-01 RX ADMIN — PALONOSETRON 0.25 MG: 0.05 INJECTION, SOLUTION INTRAVENOUS at 08:41

## 2020-01-01 RX ADMIN — PEGFILGRASTIM 6 MG: KIT SUBCUTANEOUS at 14:23

## 2020-01-01 RX ADMIN — PEGFILGRASTIM 6 MG: KIT SUBCUTANEOUS at 15:58

## 2020-01-01 RX ADMIN — VINCRISTINE SULFATE 2 MG: 1 INJECTION, SOLUTION INTRAVENOUS at 14:05

## 2020-01-01 RX ADMIN — DOXORUBICIN HYDROCHLORIDE 124 MG: 2 INJECTION, SOLUTION INTRAVENOUS at 13:53

## 2020-01-01 RX ADMIN — DOXORUBICIN HYDROCHLORIDE 124 MG: 2 INJECTION, SOLUTION INTRAVENOUS at 14:18

## 2020-01-01 RX ADMIN — ACETAMINOPHEN 650 MG: 325 TABLET ORAL at 08:41

## 2020-01-01 RX ADMIN — DEXAMETHASONE SODIUM PHOSPHATE 12 MG: 10 INJECTION, SOLUTION INTRAMUSCULAR; INTRAVENOUS at 14:00

## 2020-01-01 RX ADMIN — DIPHENHYDRAMINE HYDROCHLORIDE 50 MG: 50 INJECTION, SOLUTION INTRAMUSCULAR; INTRAVENOUS at 08:50

## 2020-01-01 RX ADMIN — SODIUM CHLORIDE, PRESERVATIVE FREE 10 ML: 5 INJECTION INTRAVENOUS at 15:17

## 2020-01-01 RX ADMIN — ACETAMINOPHEN 650 MG: 325 TABLET ORAL at 08:58

## 2020-01-01 RX ADMIN — DEXAMETHASONE SODIUM PHOSPHATE 12 MG: 10 INJECTION, SOLUTION INTRAMUSCULAR; INTRAVENOUS at 14:40

## 2020-01-01 RX ADMIN — RITUXIMAB 930 MG: 10 INJECTION, SOLUTION INTRAVENOUS at 10:26

## 2020-01-01 RX ADMIN — Medication 500 UNITS: at 15:17

## 2020-01-01 RX ADMIN — DIPHENHYDRAMINE HYDROCHLORIDE 50 MG: 50 INJECTION, SOLUTION INTRAMUSCULAR; INTRAVENOUS at 09:00

## 2020-01-01 RX ADMIN — SODIUM CHLORIDE 250 ML: 9 INJECTION, SOLUTION INTRAVENOUS at 08:58

## 2020-01-01 RX ADMIN — VINCRISTINE SULFATE 2 MG: 1 INJECTION, SOLUTION INTRAVENOUS at 14:57

## 2020-01-01 RX ADMIN — SODIUM CHLORIDE 250 ML: 9 INJECTION, SOLUTION INTRAVENOUS at 08:42

## 2020-01-01 RX ADMIN — DEXAMETHASONE SODIUM PHOSPHATE 12 MG: 10 INJECTION, SOLUTION INTRAMUSCULAR; INTRAVENOUS at 08:43

## 2020-01-01 RX ADMIN — SODIUM CHLORIDE, POTASSIUM CHLORIDE, SODIUM LACTATE AND CALCIUM CHLORIDE: 600; 310; 30; 20 INJECTION, SOLUTION INTRAVENOUS at 15:10

## 2020-01-01 RX ADMIN — RITUXIMAB 930 MG: 10 INJECTION, SOLUTION INTRAVENOUS at 09:39

## 2020-01-01 RX ADMIN — SODIUM CHLORIDE 1000 ML: 9 INJECTION, SOLUTION INTRAVENOUS at 14:21

## 2020-01-01 RX ADMIN — PALONOSETRON 0.25 MG: 0.05 INJECTION, SOLUTION INTRAVENOUS at 14:08

## 2020-01-01 RX ADMIN — SODIUM CHLORIDE 1870 MG: 900 INJECTION, SOLUTION INTRAVENOUS at 14:22

## 2020-01-01 RX ADMIN — PEGFILGRASTIM 6 MG: KIT SUBCUTANEOUS at 14:41

## 2020-01-01 RX ADMIN — PROPOFOL 140 MCG/KG/MIN: 10 INJECTION, EMULSION INTRAVENOUS at 15:22

## 2020-01-01 RX ADMIN — DOXORUBICIN HYDROCHLORIDE 124 MG: 2 INJECTION, SOLUTION INTRAVENOUS at 15:18

## 2020-01-01 RX ADMIN — Medication 500 UNITS: at 15:16

## 2020-01-01 RX ADMIN — SODIUM CHLORIDE 1000 ML: 9 INJECTION, SOLUTION INTRAVENOUS at 15:23

## 2020-01-01 RX ADMIN — SODIUM CHLORIDE 250 ML: 9 INJECTION, SOLUTION INTRAVENOUS at 08:40

## 2020-01-01 RX ADMIN — PEGFILGRASTIM 6 MG: 6 INJECTION SUBCUTANEOUS at 15:01

## 2020-01-01 RX ADMIN — FAMOTIDINE 20 MG: 10 INJECTION INTRAVENOUS at 08:48

## 2020-01-01 RX ADMIN — ACETAMINOPHEN 650 MG: 325 TABLET ORAL at 08:47

## 2020-01-01 RX ADMIN — CEFAZOLIN SODIUM 2 G: 2 INJECTION, SOLUTION INTRAVENOUS at 15:12

## 2020-01-01 RX ADMIN — SODIUM CHLORIDE 1870 MG: 900 INJECTION, SOLUTION INTRAVENOUS at 15:24

## 2020-01-01 RX ADMIN — FAMOTIDINE 20 MG: 10 INJECTION INTRAVENOUS at 08:41

## 2020-02-21 DIAGNOSIS — I10 ESSENTIAL HYPERTENSION: Primary | ICD-10-CM

## 2020-02-21 DIAGNOSIS — R79.89 ELEVATED LIVER FUNCTION TESTS: ICD-10-CM

## 2020-02-21 DIAGNOSIS — E78.2 MIXED HYPERLIPIDEMIA: ICD-10-CM

## 2020-02-25 LAB
ALBUMIN SERPL-MCNC: 4.9 G/DL (ref 3.5–5.2)
ALBUMIN/GLOB SERPL: 1.8 G/DL
ALP SERPL-CCNC: 49 U/L (ref 39–117)
ALT SERPL-CCNC: 43 U/L (ref 1–41)
AST SERPL-CCNC: 35 U/L (ref 1–40)
BILIRUB SERPL-MCNC: 0.7 MG/DL (ref 0.2–1.2)
BUN SERPL-MCNC: 20 MG/DL (ref 6–20)
BUN/CREAT SERPL: 16.5 (ref 7–25)
CALCIUM SERPL-MCNC: 10.1 MG/DL (ref 8.6–10.5)
CHLORIDE SERPL-SCNC: 93 MMOL/L (ref 98–107)
CHOLEST SERPL-MCNC: 148 MG/DL (ref 0–200)
CO2 SERPL-SCNC: 30.8 MMOL/L (ref 22–29)
CREAT SERPL-MCNC: 1.21 MG/DL (ref 0.76–1.27)
GLOBULIN SER CALC-MCNC: 2.7 GM/DL
GLUCOSE SERPL-MCNC: 99 MG/DL (ref 65–99)
HDLC SERPL-MCNC: 49 MG/DL (ref 40–60)
LDLC SERPL CALC-MCNC: 81 MG/DL (ref 0–100)
LDLC/HDLC SERPL: 1.66 {RATIO}
POTASSIUM SERPL-SCNC: 4.2 MMOL/L (ref 3.5–5.2)
PROT SERPL-MCNC: 7.6 G/DL (ref 6–8.5)
SODIUM SERPL-SCNC: 136 MMOL/L (ref 136–145)
TRIGL SERPL-MCNC: 88 MG/DL (ref 0–150)
VLDLC SERPL CALC-MCNC: 17.6 MG/DL

## 2020-02-28 ENCOUNTER — OFFICE VISIT (OUTPATIENT)
Dept: FAMILY MEDICINE CLINIC | Facility: CLINIC | Age: 60
End: 2020-02-28

## 2020-02-28 VITALS
SYSTOLIC BLOOD PRESSURE: 122 MMHG | TEMPERATURE: 98.3 F | WEIGHT: 275.8 LBS | HEART RATE: 81 BPM | BODY MASS INDEX: 33.58 KG/M2 | RESPIRATION RATE: 18 BRPM | OXYGEN SATURATION: 96 % | DIASTOLIC BLOOD PRESSURE: 76 MMHG | HEIGHT: 76 IN

## 2020-02-28 DIAGNOSIS — Z12.5 PROSTATE CANCER SCREENING: ICD-10-CM

## 2020-02-28 DIAGNOSIS — I10 ESSENTIAL HYPERTENSION: Primary | ICD-10-CM

## 2020-02-28 DIAGNOSIS — M10.00 IDIOPATHIC GOUT, UNSPECIFIED CHRONICITY, UNSPECIFIED SITE: ICD-10-CM

## 2020-02-28 DIAGNOSIS — E78.2 MIXED HYPERLIPIDEMIA: ICD-10-CM

## 2020-02-28 PROBLEM — R79.9 ABNORMAL BLOOD CHEMISTRY LEVEL: Status: ACTIVE | Noted: 2020-02-28

## 2020-02-28 PROBLEM — N18.9 CHRONIC KIDNEY DISEASE: Status: ACTIVE | Noted: 2020-02-28

## 2020-02-28 PROCEDURE — 99213 OFFICE O/P EST LOW 20 MIN: CPT | Performed by: NURSE PRACTITIONER

## 2020-03-02 LAB
URATE SERPL-MCNC: 7.1 MG/DL (ref 3.4–7)
WRITTEN AUTHORIZATION: NORMAL

## 2020-03-04 NOTE — PROGRESS NOTES
"Subjective   Jake Saunders is a 59 y.o. male.     Patient is here today follow-up essential hypertension presently controlled blood pressure today 126/76  Hyperlipidemia mixed diet control history of idiopathic gout patient takes allopurinol without problem  Last uric acid 7.1  Previous liver function test mildly elevated  He has been advised to see gastroenterology and have an ultrasound of his liver  He has declined but we will repeat this today    Colonoscopy 2014        /76 (BP Location: Left arm, Patient Position: Sitting, Cuff Size: Large Adult)   Pulse 81   Temp 98.3 °F (36.8 °C) (Oral)   Resp 18   Ht 193 cm (75.98\")   Wt 125 kg (275 lb 12.8 oz)   SpO2 96%   BMI 33.59 kg/m²       The following portions of the patient's history were reviewed and updated as appropriate: allergies, current medications, past family history, past medical history, past social history, past surgical history and problem list.    Review of Systems    Objective   Physical Exam      Assessment/Plan   Jake was seen today for essential hypertension.    Diagnoses and all orders for this visit:    Essential hypertension  -     Uric Acid  -     PSA Screen    Mixed hyperlipidemia  -     Uric Acid  -     PSA Screen    Idiopathic gout, unspecified chronicity, unspecified site  -     Uric Acid  -     PSA Screen    Prostate cancer screening  -     Uric Acid  -     PSA Screen      Essential hypertension mixed hyperlipidemia continue regular exercise healthy diet  History of gout we should treat his uric acid less than 6 he is a screening risk-benefit colonoscopy when due  Repeat liver function test if elevated see gastroenterology for further work-up    Discussed risk of fatty liver healthy diet strategy to avoid  Continue present medication antihypertensives allopurinol refills      There are no Patient Instructions on file for this visit.           "

## 2020-03-09 RX ORDER — OLMESARTAN MEDOXOMIL, AMLODIPINE AND HYDROCHLOROTHIAZIDE TABLET 40/5/25 MG 40; 5; 25 MG/1; MG/1; MG/1
1 TABLET ORAL DAILY
Qty: 90 TABLET | Refills: 1 | Status: SHIPPED | OUTPATIENT
Start: 2020-03-09 | End: 2020-01-01 | Stop reason: SDUPTHER

## 2020-03-09 RX ORDER — METOPROLOL SUCCINATE 50 MG/1
TABLET, EXTENDED RELEASE ORAL
Qty: 90 TABLET | Refills: 1 | Status: SHIPPED | OUTPATIENT
Start: 2020-03-09 | End: 2020-01-01 | Stop reason: SDUPTHER

## 2020-03-09 RX ORDER — CETIRIZINE HYDROCHLORIDE 10 MG/1
10 TABLET ORAL DAILY
Qty: 90 TABLET | Refills: 1 | Status: SHIPPED | OUTPATIENT
Start: 2020-03-09 | End: 2021-01-01

## 2020-03-18 RX ORDER — ALLOPURINOL 100 MG/1
100 TABLET ORAL DAILY
Qty: 90 TABLET | Refills: 3 | Status: SHIPPED | OUTPATIENT
Start: 2020-03-18 | End: 2020-01-01 | Stop reason: DRUGHIGH

## 2020-10-08 DIAGNOSIS — I10 ESSENTIAL HYPERTENSION: Primary | ICD-10-CM

## 2020-10-08 DIAGNOSIS — Z12.5 PROSTATE CANCER SCREENING: ICD-10-CM

## 2020-10-08 DIAGNOSIS — E78.2 MIXED HYPERLIPIDEMIA: ICD-10-CM

## 2020-10-08 DIAGNOSIS — M10.00 IDIOPATHIC GOUT, UNSPECIFIED CHRONICITY, UNSPECIFIED SITE: ICD-10-CM

## 2020-10-12 ENCOUNTER — LAB (OUTPATIENT)
Dept: LAB | Facility: HOSPITAL | Age: 60
End: 2020-10-12

## 2020-10-12 DIAGNOSIS — E78.2 MIXED HYPERLIPIDEMIA: ICD-10-CM

## 2020-10-12 DIAGNOSIS — I10 ESSENTIAL HYPERTENSION: ICD-10-CM

## 2020-10-12 DIAGNOSIS — Z12.5 PROSTATE CANCER SCREENING: ICD-10-CM

## 2020-10-12 DIAGNOSIS — M10.00 IDIOPATHIC GOUT, UNSPECIFIED CHRONICITY, UNSPECIFIED SITE: ICD-10-CM

## 2020-10-12 LAB
ALBUMIN SERPL-MCNC: 4.7 G/DL (ref 3.5–5.2)
ALBUMIN/GLOB SERPL: 1.6 G/DL
ALP SERPL-CCNC: 62 U/L (ref 39–117)
ALT SERPL W P-5'-P-CCNC: 34 U/L (ref 1–41)
ANION GAP SERPL CALCULATED.3IONS-SCNC: 10.4 MMOL/L (ref 5–15)
AST SERPL-CCNC: 32 U/L (ref 1–40)
BILIRUB SERPL-MCNC: 0.4 MG/DL (ref 0–1.2)
BUN SERPL-MCNC: 15 MG/DL (ref 6–20)
BUN/CREAT SERPL: 12.3 (ref 7–25)
CALCIUM SPEC-SCNC: 9.8 MG/DL (ref 8.6–10.5)
CHLORIDE SERPL-SCNC: 98 MMOL/L (ref 98–107)
CHOLEST SERPL-MCNC: 145 MG/DL (ref 0–200)
CO2 SERPL-SCNC: 29.6 MMOL/L (ref 22–29)
CREAT SERPL-MCNC: 1.22 MG/DL (ref 0.76–1.27)
GFR SERPL CREATININE-BSD FRML MDRD: 61 ML/MIN/1.73
GLOBULIN UR ELPH-MCNC: 2.9 GM/DL
GLUCOSE SERPL-MCNC: 104 MG/DL (ref 65–99)
HDLC SERPL-MCNC: 33 MG/DL (ref 40–60)
LDLC SERPL CALC-MCNC: 93 MG/DL (ref 0–100)
LDLC/HDLC SERPL: 2.78 {RATIO}
POTASSIUM SERPL-SCNC: 3.6 MMOL/L (ref 3.5–5.2)
PROT SERPL-MCNC: 7.6 G/DL (ref 6–8.5)
PSA SERPL-MCNC: 1.77 NG/ML (ref 0–4)
SODIUM SERPL-SCNC: 138 MMOL/L (ref 136–145)
TRIGL SERPL-MCNC: 101 MG/DL (ref 0–150)
URATE SERPL-MCNC: 7 MG/DL (ref 3.4–7)
VLDLC SERPL-MCNC: 19 MG/DL (ref 5–40)

## 2020-10-12 PROCEDURE — 80061 LIPID PANEL: CPT | Performed by: NURSE PRACTITIONER

## 2020-10-12 PROCEDURE — 36415 COLL VENOUS BLD VENIPUNCTURE: CPT

## 2020-10-12 PROCEDURE — G0103 PSA SCREENING: HCPCS

## 2020-10-12 PROCEDURE — 84550 ASSAY OF BLOOD/URIC ACID: CPT

## 2020-10-12 PROCEDURE — 80053 COMPREHEN METABOLIC PANEL: CPT

## 2020-10-16 ENCOUNTER — OFFICE VISIT (OUTPATIENT)
Dept: FAMILY MEDICINE CLINIC | Facility: CLINIC | Age: 60
End: 2020-10-16

## 2020-10-16 VITALS
DIASTOLIC BLOOD PRESSURE: 79 MMHG | SYSTOLIC BLOOD PRESSURE: 117 MMHG | HEART RATE: 78 BPM | OXYGEN SATURATION: 96 % | WEIGHT: 271.2 LBS | BODY MASS INDEX: 33.02 KG/M2 | HEIGHT: 76 IN

## 2020-10-16 DIAGNOSIS — I10 ESSENTIAL HYPERTENSION: Primary | ICD-10-CM

## 2020-10-16 DIAGNOSIS — E78.2 MIXED HYPERLIPIDEMIA: ICD-10-CM

## 2020-10-16 DIAGNOSIS — R19.00 ABDOMINAL MASS, UNSPECIFIED ABDOMINAL LOCATION: ICD-10-CM

## 2020-10-16 DIAGNOSIS — Z23 NEED FOR VACCINATION: ICD-10-CM

## 2020-10-16 PROCEDURE — 90715 TDAP VACCINE 7 YRS/> IM: CPT | Performed by: NURSE PRACTITIONER

## 2020-10-16 PROCEDURE — 90472 IMMUNIZATION ADMIN EACH ADD: CPT | Performed by: NURSE PRACTITIONER

## 2020-10-16 PROCEDURE — 99214 OFFICE O/P EST MOD 30 MIN: CPT | Performed by: NURSE PRACTITIONER

## 2020-10-16 PROCEDURE — 93000 ELECTROCARDIOGRAM COMPLETE: CPT | Performed by: NURSE PRACTITIONER

## 2020-10-16 PROCEDURE — 90686 IIV4 VACC NO PRSV 0.5 ML IM: CPT | Performed by: NURSE PRACTITIONER

## 2020-10-16 PROCEDURE — 90471 IMMUNIZATION ADMIN: CPT | Performed by: NURSE PRACTITIONER

## 2020-10-16 NOTE — PATIENT INSTRUCTIONS
Discharge instructions    Continue present medication  Continue exercise joint sparing such as walking daily focus on decreasing sugars sweets decrease alcohol    Outpatient abdominal ultrasound next week call  Next day for result    Increase abdominal pain fever chills chest pain shortness of breath emergency room    Recheck in 6 months sooner for problems    kn95 filtration mask if needed [email or web address] $2-$3 each

## 2020-10-16 NOTE — PROGRESS NOTES
Very pleasant gentleman here today follow-up essential hypertension presently controlled  He previously had some elevated liver enzymes moderately likely related to diet he is improved his diet substantially and decreased his weight has follow-up testing is normal

## 2020-10-19 ENCOUNTER — TELEPHONE (OUTPATIENT)
Dept: FAMILY MEDICINE CLINIC | Facility: CLINIC | Age: 60
End: 2020-10-19

## 2020-10-19 NOTE — TELEPHONE ENCOUNTER
Patient called in and is stating he is needing a order for a ultrasound for his stomach mass . Patient hasn't heard anything . He wants to know more information .       Please call him and advise at  [phone]

## 2020-10-20 NOTE — TELEPHONE ENCOUNTER
This appointment for the ultrasound has been scheduled. Patient is aware of upcoming appointment.

## 2020-10-22 ENCOUNTER — HOSPITAL ENCOUNTER (OUTPATIENT)
Dept: ULTRASOUND IMAGING | Facility: HOSPITAL | Age: 60
Discharge: HOME | End: 2020-10-22
Admitting: NURSE PRACTITIONER

## 2020-10-22 PROCEDURE — 76700 US EXAM ABDOM COMPLETE: CPT

## 2020-10-23 ENCOUNTER — TELEPHONE (OUTPATIENT)
Dept: FAMILY MEDICINE CLINIC | Facility: CLINIC | Age: 60
End: 2020-10-23

## 2020-10-23 DIAGNOSIS — R19.00 ABDOMINAL MASS, UNSPECIFIED ABDOMINAL LOCATION: Primary | ICD-10-CM

## 2020-10-23 NOTE — TELEPHONE ENCOUNTER
Patient is calling to check the status of CT scan.  He states Mr Epley told him to call back if he had not heard by 3:30 today.    Please advise.    Patient call back [phone]

## 2020-10-26 NOTE — PROGRESS NOTES
"Subjective   Jake Saunders is a 60 y.o. male.     Very pleasant gentleman here today follow-up essential hypertension presently controlled  He previously had some elevated liver enzymes moderately likely related to diet he is improved his diet substantially and decreased his weight has follow-up testing is normal  Recommended gastroenterology last year 2019 at that time he did not wish to see them for any further work-up  He has had no chronic abdominal pain no night sweats no unexplained weight loss  No history of malignancy  Colonoscopy 2014 was normal  Hyperlipidemia stable takes a statin      Hypertension  Pertinent negatives include no chest pain, palpitations or shortness of breath.        /79   Pulse 78   Ht 193 cm (76\")   Wt 123 kg (271 lb 3.2 oz)   SpO2 96%   BMI 33.01 kg/m²       The following portions of the patient's history were reviewed and updated as appropriate: allergies, current medications, past family history, past medical history, past social history, past surgical history and problem list.    Review of Systems   Constitutional: Negative for fatigue and fever.   HENT: Negative.  Negative for trouble swallowing.    Eyes: Negative.    Respiratory: Negative.  Negative for cough and shortness of breath.    Cardiovascular: Negative for chest pain, palpitations and leg swelling.   Gastrointestinal: Negative.  Negative for abdominal pain.   Genitourinary: Negative.    Musculoskeletal: Negative.    Skin: Negative.    Neurological: Negative.  Negative for dizziness and confusion.   Psychiatric/Behavioral: Negative.        Objective   Physical Exam  Vitals signs reviewed.   Constitutional:       Appearance: He is well-developed.   HENT:      Head: Normocephalic.      Nose: Nose normal.   Eyes:      General: No scleral icterus.     Conjunctiva/sclera: Conjunctivae normal.      Pupils: Pupils are equal, round, and reactive to light.   Neck:      Musculoskeletal: Neck supple.      Thyroid: No " thyromegaly.      Vascular: No JVD.   Cardiovascular:      Rate and Rhythm: Normal rate and regular rhythm.      Heart sounds: Normal heart sounds. No murmur. No friction rub. No gallop.    Pulmonary:      Effort: Pulmonary effort is normal. No respiratory distress.      Breath sounds: Normal breath sounds. No stridor. No wheezing or rales.   Abdominal:      General: Bowel sounds are normal. There is no distension.      Palpations: Abdomen is soft. There is mass.      Tenderness: There is no abdominal tenderness.      Hernia: No hernia is present.      Comments: No hepatosplenomegaly, no ascites,  Patient has a very large firm palpable somewhat mobile mass left upper and left lower abdomen nontender relatively firm.   Musculoskeletal:         General: No tenderness.   Lymphadenopathy:      Cervical: No cervical adenopathy.   Skin:     General: Skin is warm and dry.      Findings: No erythema or rash.   Neurological:      Mental Status: He is alert and oriented to person, place, and time.      Deep Tendon Reflexes: Reflexes are normal and symmetric.   Psychiatric:         Behavior: Behavior normal.         Thought Content: Thought content normal.         Judgment: Judgment normal.           Assessment/Plan   Diagnoses and all orders for this visit:    1. Essential hypertension (Primary)  -     ECG 12 Lead    2. Abdominal mass, unspecified abdominal location  -     US Abdomen Complete    3. Mixed hyperlipidemia    4. Need for vaccination  -     FluLaval Quad >6 Months ([id number])  -     Tdap Vaccine Greater Than or Equal To 8yo IM      Patient with large palpable firm mass,  We will schedule him as soon as possible outpatient ultrasound abdomen to further quantify this mass  Encourage patient to be prudent to get this done not to worry at this time make sure he follows up with results very important  Healthy diet regular exercise can you continue preventative measures                Patient Instructions   Discharge  instructions    Continue present medication  Continue exercise joint sparing such as walking daily focus on decreasing sugars sweets decrease alcohol    Outpatient abdominal ultrasound next week call  Next day for result    Increase abdominal pain fever chills chest pain shortness of breath emergency room    Recheck in 6 months sooner for problems    kn95 filtration mask if needed [email or web address] $2-$3 each

## 2020-10-26 NOTE — TELEPHONE ENCOUNTER
PATIENT CALLED BACK AND SAID HE STILL HAS NOT HEARD ANYTHING ABOUT GETTING HIS CT SCHEDULED. HE ALSO WANTS TO SPEAK WITH JAMES EPLEY DIRECTLY ABOUT HIS ULTRASOUND RESULTS.     PATIENT CALL BACK: [phone]

## 2020-10-26 NOTE — TELEPHONE ENCOUNTER
I can give him central scheduling's number but he still wishes to speak to you directly about his ultrasound results. Please advise

## 2020-10-26 NOTE — TELEPHONE ENCOUNTER
I spoke to patient earlier encouraged him not to worry we will get the CAT scan ASAP and I spoke to referrals as well and she will try to get this going   She will reach out to the patient today

## 2020-10-27 ENCOUNTER — HOSPITAL ENCOUNTER (OUTPATIENT)
Dept: CT IMAGING | Facility: HOSPITAL | Age: 60
Discharge: HOME | End: 2020-10-27
Admitting: NURSE PRACTITIONER

## 2020-10-27 DIAGNOSIS — R19.00 ABDOMINAL MASS, UNSPECIFIED ABDOMINAL LOCATION: Primary | ICD-10-CM

## 2020-10-27 LAB — CREAT BLDA-MCNC: 1.2 MG/DL (ref 0.6–1.3)

## 2020-10-27 PROCEDURE — 25010000002 IOPAMIDOL 61 % SOLUTION: Performed by: NURSE PRACTITIONER

## 2020-10-27 PROCEDURE — 74177 CT ABD & PELVIS W/CONTRAST: CPT

## 2020-10-27 PROCEDURE — 82565 ASSAY OF CREATININE: CPT

## 2020-10-27 RX ADMIN — IOPAMIDOL 85 ML: 612 INJECTION, SOLUTION INTRAVENOUS at 08:41

## 2020-10-28 ENCOUNTER — TELEPHONE (OUTPATIENT)
Dept: FAMILY MEDICINE CLINIC | Facility: CLINIC | Age: 60
End: 2020-10-28

## 2020-10-28 NOTE — TELEPHONE ENCOUNTER
DONTA FROM DR DENNIS OFFICE CALLED-    JAMES EPLEY WANTED TO SPEAK TO DR DENNIS REGARDING PATIENT'S REFERRAL-DR DENNIS WANTS TO SEE PATIENT FIRST (APPT IS 10/29) AND THEN WILL CALL EPLEY TO DISCUSS    ANY QUESTIONS-DONTA : [phone]

## 2020-10-29 ENCOUNTER — CONSULT (OUTPATIENT)
Dept: ONCOLOGY | Facility: CLINIC | Age: 60
End: 2020-10-29

## 2020-10-29 ENCOUNTER — LAB (OUTPATIENT)
Dept: LAB | Facility: HOSPITAL | Age: 60
End: 2020-10-29

## 2020-10-29 VITALS
HEIGHT: 76 IN | BODY MASS INDEX: 32.39 KG/M2 | TEMPERATURE: 97.5 F | OXYGEN SATURATION: 95 % | SYSTOLIC BLOOD PRESSURE: 117 MMHG | HEART RATE: 79 BPM | WEIGHT: 266 LBS | DIASTOLIC BLOOD PRESSURE: 84 MMHG | RESPIRATION RATE: 21 BRPM

## 2020-10-29 DIAGNOSIS — R19.02 LEFT UPPER QUADRANT ABDOMINAL MASS: Primary | ICD-10-CM

## 2020-10-29 DIAGNOSIS — Z09 ONCOLOGY FOLLOW-UP ENCOUNTER: Primary | ICD-10-CM

## 2020-10-29 LAB
ALBUMIN SERPL-MCNC: 4.9 G/DL (ref 3.5–5.2)
ALBUMIN/GLOB SERPL: 1.7 G/DL (ref 1.1–2.4)
ALP SERPL-CCNC: 64 U/L (ref 38–116)
ALT SERPL W P-5'-P-CCNC: 28 U/L (ref 0–41)
ANION GAP SERPL CALCULATED.3IONS-SCNC: 12.9 MMOL/L (ref 5–15)
APTT PPP: 28.8 SECONDS (ref 22.7–35.4)
AST SERPL-CCNC: 34 U/L (ref 0–40)
BASOPHILS # BLD AUTO: 0.06 10*3/MM3 (ref 0–0.2)
BASOPHILS NFR BLD AUTO: 0.9 % (ref 0–1.5)
BILIRUB SERPL-MCNC: 0.5 MG/DL (ref 0.2–1.2)
BUN SERPL-MCNC: 20 MG/DL (ref 6–20)
BUN/CREAT SERPL: 18 (ref 7.3–30)
CALCIUM SPEC-SCNC: 10 MG/DL (ref 8.5–10.2)
CHLORIDE SERPL-SCNC: 98 MMOL/L (ref 98–107)
CO2 SERPL-SCNC: 26.1 MMOL/L (ref 22–29)
CREAT SERPL-MCNC: 1.11 MG/DL (ref 0.7–1.3)
DEPRECATED RDW RBC AUTO: 44.1 FL (ref 37–54)
EOSINOPHIL # BLD AUTO: 0.33 10*3/MM3 (ref 0–0.4)
EOSINOPHIL NFR BLD AUTO: 4.8 % (ref 0.3–6.2)
ERYTHROCYTE [DISTWIDTH] IN BLOOD BY AUTOMATED COUNT: 13.4 % (ref 12.3–15.4)
GFR SERPL CREATININE-BSD FRML MDRD: 68 ML/MIN/1.73
GLOBULIN UR ELPH-MCNC: 2.9 GM/DL (ref 1.8–3.5)
GLUCOSE SERPL-MCNC: 101 MG/DL (ref 74–124)
HCT VFR BLD AUTO: 42.3 % (ref 37.5–51)
HGB BLD-MCNC: 14.6 G/DL (ref 13–17.7)
IMM GRANULOCYTES # BLD AUTO: 0.04 10*3/MM3 (ref 0–0.05)
IMM GRANULOCYTES NFR BLD AUTO: 0.6 % (ref 0–0.5)
INR PPP: 1.07 (ref 0.9–1.1)
LDH SERPL-CCNC: 371 U/L (ref 99–259)
LYMPHOCYTES # BLD AUTO: 0.49 10*3/MM3 (ref 0.7–3.1)
LYMPHOCYTES NFR BLD AUTO: 7.1 % (ref 19.6–45.3)
MCH RBC QN AUTO: 31 PG (ref 26.6–33)
MCHC RBC AUTO-ENTMCNC: 34.5 G/DL (ref 31.5–35.7)
MCV RBC AUTO: 89.8 FL (ref 79–97)
MONOCYTES # BLD AUTO: 0.68 10*3/MM3 (ref 0.1–0.9)
MONOCYTES NFR BLD AUTO: 9.8 % (ref 5–12)
NEUTROPHILS NFR BLD AUTO: 5.34 10*3/MM3 (ref 1.7–7)
NEUTROPHILS NFR BLD AUTO: 76.8 % (ref 42.7–76)
NRBC BLD AUTO-RTO: 0 /100 WBC (ref 0–0.2)
PLATELET # BLD AUTO: 208 10*3/MM3 (ref 140–450)
PMV BLD AUTO: 11.1 FL (ref 6–12)
POTASSIUM SERPL-SCNC: 4.1 MMOL/L (ref 3.5–4.7)
PROT SERPL-MCNC: 7.8 G/DL (ref 6.3–8)
PROTHROMBIN TIME: 13.8 SECONDS (ref 11.7–14.2)
RBC # BLD AUTO: 4.71 10*6/MM3 (ref 4.14–5.8)
SODIUM SERPL-SCNC: 137 MMOL/L (ref 134–145)
URATE SERPL-MCNC: 6.1 MG/DL (ref 2.8–7.4)
WBC # BLD AUTO: 6.94 10*3/MM3 (ref 3.4–10.8)

## 2020-10-29 PROCEDURE — 85730 THROMBOPLASTIN TIME PARTIAL: CPT | Performed by: INTERNAL MEDICINE

## 2020-10-29 PROCEDURE — 99245 OFF/OP CONSLTJ NEW/EST HI 55: CPT | Performed by: INTERNAL MEDICINE

## 2020-10-29 PROCEDURE — 88185 FLOWCYTOMETRY/TC ADD-ON: CPT | Performed by: INTERNAL MEDICINE

## 2020-10-29 PROCEDURE — 88184 FLOWCYTOMETRY/ TC 1 MARKER: CPT | Performed by: INTERNAL MEDICINE

## 2020-10-29 PROCEDURE — 85025 COMPLETE CBC W/AUTO DIFF WBC: CPT

## 2020-10-29 PROCEDURE — 85610 PROTHROMBIN TIME: CPT | Performed by: INTERNAL MEDICINE

## 2020-10-29 PROCEDURE — 36415 COLL VENOUS BLD VENIPUNCTURE: CPT

## 2020-10-29 PROCEDURE — 83615 LACTATE (LD) (LDH) ENZYME: CPT | Performed by: INTERNAL MEDICINE

## 2020-10-29 PROCEDURE — 84550 ASSAY OF BLOOD/URIC ACID: CPT | Performed by: INTERNAL MEDICINE

## 2020-10-29 PROCEDURE — 80053 COMPREHEN METABOLIC PANEL: CPT | Performed by: INTERNAL MEDICINE

## 2020-10-29 NOTE — TELEPHONE ENCOUNTER
Discussed patient's case with Dr. Rodriges  He is set him up this week for CT-guided biopsy  Followed by PET scan  Looks like lymphoma likely will be confirmed by biopsy  I appreciate the communications regarding patient's present diagnosis and upcoming care

## 2020-10-30 ENCOUNTER — TELEPHONE (OUTPATIENT)
Dept: ONCOLOGY | Facility: CLINIC | Age: 60
End: 2020-10-30

## 2020-10-30 LAB
ALBUMIN SERPL ELPH-MCNC: 4.3 G/DL (ref 2.9–4.4)
ALBUMIN/GLOB SERPL: 1.3 {RATIO} (ref 0.7–1.7)
ALPHA1 GLOB SERPL ELPH-MCNC: 0.3 G/DL (ref 0–0.4)
ALPHA2 GLOB SERPL ELPH-MCNC: 1 G/DL (ref 0.4–1)
B-GLOBULIN SERPL ELPH-MCNC: 1 G/DL (ref 0.7–1.3)
GAMMA GLOB SERPL ELPH-MCNC: 1.2 G/DL (ref 0.4–1.8)
GLOBULIN SER-MCNC: 3.5 G/DL (ref 2.2–3.9)
IGA SERPL-MCNC: 131 MG/DL (ref 90–386)
IGG SERPL-MCNC: 1106 MG/DL (ref 603–1613)
IGM SERPL-MCNC: 42 MG/DL (ref 20–172)
INTERPRETATION SERPL IEP-IMP: NORMAL
KAPPA LC FREE SER-MCNC: 17.3 MG/L (ref 3.3–19.4)
KAPPA LC FREE/LAMBDA FREE SER: 1.31 {RATIO} (ref 0.26–1.65)
LABORATORY COMMENT REPORT: NORMAL
LAMBDA LC FREE SERPL-MCNC: 13.2 MG/L (ref 5.7–26.3)
M PROTEIN SERPL ELPH-MCNC: NORMAL G/DL
PROT SERPL-MCNC: 7.8 G/DL (ref 6–8.5)

## 2020-10-30 NOTE — TELEPHONE ENCOUNTER
Jake's wife Aubrie called to ask if COVID testing is necessary before pt's biopsy on 11/03    Please call her back @ [phone]

## 2020-10-30 NOTE — TELEPHONE ENCOUNTER
Pts wife wanted to know if her  will need a Covid test for his CT guided biopsy. Pt will call CT/Radiology at [facility] and ask them. Aubrie GEE/VILMA.

## 2020-11-02 ENCOUNTER — DOCUMENTATION (OUTPATIENT)
Dept: ONCOLOGY | Facility: CLINIC | Age: 60
End: 2020-11-02

## 2020-11-02 NOTE — PROGRESS NOTES
CSW sent letter of introduction to patient with contact information and remains available to provide support/resource as needed, including ACP as advanced directives are not on file.

## 2020-11-03 ENCOUNTER — HOSPITAL ENCOUNTER (OUTPATIENT)
Dept: CT IMAGING | Facility: HOSPITAL | Age: 60
Discharge: HOME | End: 2020-11-03
Admitting: INTERNAL MEDICINE

## 2020-11-03 VITALS
HEART RATE: 76 BPM | SYSTOLIC BLOOD PRESSURE: 118 MMHG | RESPIRATION RATE: 16 BRPM | WEIGHT: 262 LBS | TEMPERATURE: 97.1 F | BODY MASS INDEX: 30.94 KG/M2 | DIASTOLIC BLOOD PRESSURE: 77 MMHG | HEIGHT: 77 IN | OXYGEN SATURATION: 94 %

## 2020-11-03 DIAGNOSIS — R19.02 LEFT UPPER QUADRANT ABDOMINAL MASS: ICD-10-CM

## 2020-11-03 LAB
INR PPP: 1.1 (ref 0.8–1.2)
PLATELET # BLD AUTO: 223 10*3/MM3 (ref 140–450)
PROTHROMBIN TIME: 13 SECONDS (ref 12.8–15.2)

## 2020-11-03 PROCEDURE — 77012 CT SCAN FOR NEEDLE BIOPSY: CPT

## 2020-11-03 PROCEDURE — 85610 PROTHROMBIN TIME: CPT

## 2020-11-03 PROCEDURE — 88300 SURGICAL PATH GROSS: CPT | Performed by: INTERNAL MEDICINE

## 2020-11-03 PROCEDURE — 25010000003 LIDOCAINE 1 % SOLUTION: Performed by: RADIOLOGY

## 2020-11-03 PROCEDURE — 88360 TUMOR IMMUNOHISTOCHEM/MANUAL: CPT | Performed by: INTERNAL MEDICINE

## 2020-11-03 PROCEDURE — 88305 TISSUE EXAM BY PATHOLOGIST: CPT | Performed by: INTERNAL MEDICINE

## 2020-11-03 PROCEDURE — 88341 IMHCHEM/IMCYTCHM EA ADD ANTB: CPT | Performed by: INTERNAL MEDICINE

## 2020-11-03 PROCEDURE — 85049 AUTOMATED PLATELET COUNT: CPT | Performed by: RADIOLOGY

## 2020-11-03 PROCEDURE — 88342 IMHCHEM/IMCYTCHM 1ST ANTB: CPT | Performed by: INTERNAL MEDICINE

## 2020-11-03 RX ORDER — LIDOCAINE HYDROCHLORIDE 10 MG/ML
20 INJECTION, SOLUTION INFILTRATION; PERINEURAL ONCE
Status: COMPLETED | OUTPATIENT
Start: 2020-11-03 | End: 2020-11-03

## 2020-11-03 RX ADMIN — LIDOCAINE HYDROCHLORIDE 20 ML: 10 INJECTION, SOLUTION INFILTRATION; PERINEURAL at 08:54

## 2020-11-03 RX ADMIN — GELATIN ABSORBABLE SPONGE 12-7 MM: 12-7 MISC at 09:00

## 2020-11-03 NOTE — DISCHARGE INSTRUCTIONS
EDUCATION /DISCHARGE INSTRUCTIONS  CT/US guided biopsy:  A biopsy is a procedure done to remove tissue for further analysis.  Before images are taken to locate the target area.  Images can be obtained using ultrasound, CT or MRI.  A physician will clean your skin with antiseptic soap, place a sterile towel around the site and administer a local anesthetic to numb the area.  The physician will then insert a special needle.  Sometimes images are taken of the needle after it is inserted to ensure the needle is in the correct area to be biopsied.   A sample is obtained and sent to the laboratory for study.  Occasionally the laboratory is unable to make a diagnosis from the sample and the procedure may need to be repeated.  Within a week the radiologist will send a report to your physician.  A pathologist will also examine the tissue and send a report.    Risks of the procedure include but are not limited to:   *  Bleeding    *  Infection   *  Puncture of surrounding organs *  Death     *  Lung collapse if the biopsy is near the chest which may require insertion of a      chest tube to re-inflate the lung if severe.    Benefits of the procedure:  Using x-ray helps to locate the area that requires a biopsy. The procedure is less invasive than a surgical procedure, there are no large incisions and it does not require anesthesia.    Alternatives to the procedure:  A biopsy can be performed surgically.  Risks of a surgical biopsy include exposure to anesthesia, infection, excessive bleeding and injury to abdominal organs.  A benefit of surgical biopsy is the ability to see the area to be biopsied and remove of a larger piece of tissue.    THIS EDUCATION INFORMATION WAS REVIEWED PRIOR TO PROCEDURE AND CONSENT. Patient initials__________________Time____0810_______________    Post Procedure:    *  Expect the biopsy site may be tender up to one week.    *  Rest today (no pushing pulling or straining).   *  Slowly increase  activity tomorrow.    *  If you received sedation do not drive for 24 hours.   *  Keep dressing clean and dry.   *  Leave dressing on puncture site for 24 hours.    *  You may shower when dressing removed.  Call your doctor if experiencing:   *  Signs of infection such as redness, swelling, excessive pain and / or foul        smelling drainage from the puncture site.   *  Chills or fever over 101 degrees (by mouth).   *  Unrelieved pain.   *  Any new or severe symptoms.   *  If experiencing sudden / severe shortness of breath or chest pain go to the       nearest emergency room.   Following the procedure:     Follow-up with the ordering physician as directed.    Continue to take other medications as directed by your physician unless    otherwise instructed.   If applicable, resume taking your blood thinners or Aspirin on _November 4, 2020 after 10:00 AM__________.    If you have any concerns please call the Radiology Nurses Desk at [phone].  You are the most important factor in your recovery.  Follow the above instructions carefully.

## 2020-11-04 ENCOUNTER — TELEPHONE (OUTPATIENT)
Dept: INTERVENTIONAL RADIOLOGY/VASCULAR | Facility: HOSPITAL | Age: 60
End: 2020-11-04

## 2020-11-05 ENCOUNTER — HOSPITAL ENCOUNTER (OUTPATIENT)
Dept: PET IMAGING | Facility: HOSPITAL | Age: 60
Discharge: HOME | End: 2020-11-05

## 2020-11-05 DIAGNOSIS — R19.02 LEFT UPPER QUADRANT ABDOMINAL MASS: ICD-10-CM

## 2020-11-05 LAB — GLUCOSE BLDC GLUCOMTR-MCNC: 94 MG/DL (ref 70–130)

## 2020-11-05 PROCEDURE — 0 FLUDEOXYGLUCOSE F18 SOLUTION: Performed by: INTERNAL MEDICINE

## 2020-11-05 PROCEDURE — 78815 PET IMAGE W/CT SKULL-THIGH: CPT

## 2020-11-05 PROCEDURE — A9552 F18 FDG: HCPCS | Performed by: INTERNAL MEDICINE

## 2020-11-05 PROCEDURE — 82962 GLUCOSE BLOOD TEST: CPT

## 2020-11-05 RX ADMIN — FLUDEOXYGLUCOSE F18 1 DOSE: 300 INJECTION INTRAVENOUS at 08:17

## 2020-11-12 PROBLEM — C82.28: Status: ACTIVE | Noted: 2020-01-01

## 2020-11-12 PROBLEM — Z91.89 AT HIGH RISK OF TUMOR LYSIS SYNDROME: Status: ACTIVE | Noted: 2020-01-01

## 2020-11-12 NOTE — PROGRESS NOTES
Subjective         REASON FOR FOLLOW UP:  Large abdominal mass most likely lymphoma: biopsy shows grade 3 follicular lymphoma stage IV by PET SCAN    HISTORY OF PRESENT ILLNESS:  The patient is a 60 y.o. year old male who is here for an opinion about the above issue.  I had the opportunity to see this delightful individual in consultation along with his wife today in the office a 60-year-old white male who came to Mr. James Epley, APRN, at [facility] for routine assessment every 6 month visit and upon clinical examination he was found to have a very large abdominal mass. Obviously a CT scan was performed that documents a very large mass that is close to the stomach independent of the spleen, pancreas, left kidney and bowel and probably representing a conglomerate of masses and tumors that probably represents a non-Hodgkin's lymphoma. The patient states that he has had some early satiety in the last several months and he is not eating as much as he used to. He also has mild element of constipation, his bowel movement is harder than usual once or twice a day. He has not seen any passage of blood in the stool. He denies emphatically any abdominal pain, sensation of fullness and he denies any fever, chills. A few nights ago he had some night sweats but he assumes it is because he had too many blankets on him. He has not had any pruritus. He does not feel fatigued. He denies any cough or sputum production, no shortness of breath, no pleuritic pain. He has not had any rashes in the skin. He denies any joint pain or bone pain. He denies any neurological symptomatology. He has no urinary complaints with no frequency, urgency or hematuria. He denies any other alterations at this time.     The patient returned to the office on 11/12/2020 after he has proceeded with a CT guided biopsy of the mass in the abdomen that documents a grade 3 follicular lymphoma. Also he proceeded in the interim for a PET scan to be reviewed  today.    Physically the patient continues feeling well, he has some element of early satiety and abdominal fullness associated with the large mass in his abdomen. His bowel activity and urination remain normal. He has no cardiovascular or respiratory issues. He denies any fever, chills, night sweats, pruritus or weight loss. He has an element of fatigue as stated.         Past Medical History:   Diagnosis Date   • Allergic rhinitis    • Gout    • Hyperlipidemia    • Hypertension         Past Surgical History:   Procedure Laterality Date   • COLONOSCOPY  2014    normal        Current Outpatient Medications on File Prior to Visit   Medication Sig Dispense Refill   • allopurinol (Zyloprim) 100 MG tablet Take 1 tablet by mouth Daily. 90 tablet 3   • allopurinol (ZYLOPRIM) 300 MG tablet Take 1 tablet by mouth Daily. 90 tablet 3   • cetirizine (zyrTEC) 10 MG tablet Take 1 tablet by mouth Daily. 90 tablet 1   • metoprolol succinate XL (TOPROL-XL) 50 MG 24 hr tablet TAKE 1 TABLET BY MOUTH DAILY 90 tablet 1   • Olmesartan-amLODIPine-HCTZ 40-5-25 MG tablet Take 1 tablet by mouth Daily. 90 tablet 1     No current facility-administered medications on file prior to visit.         ALLERGIES:  No Known Allergies     Social History     Socioeconomic History   • Marital status:      Spouse name: Aubrie   • Number of children: Not on file   • Years of education: Not on file   • Highest education level: Not on file   Occupational History     Employer: [employer]   Tobacco Use   • Smoking status: Never Smoker   • Smokeless tobacco: Never Used   Substance and Sexual Activity   • Alcohol use: Yes   • Drug use: No      ONCOLOGIC HISTORY:The patient is a 60 y.o. year old male who is here for an opinion about the above issue.  I had the opportunity to see this delightful individual in consultation along with his wife today in the office a 60-year-old white male who came to Mr. James Epley, APRN, at [facility] for  routine assessment every 6 month visit and upon clinical examination he was found to have a very large abdominal mass. Obviously a CT scan was performed that documents a very large mass that is close to the stomach independent of the spleen, pancreas, left kidney and bowel and probably representing a conglomerate of masses and tumors that probably represents a non-Hodgkin's lymphoma. The patient states that he has had some early satiety in the last several months and he is not eating as much as he used to. He also has mild element of constipation, his bowel movement is harder than usual once or twice a day. He has not seen any passage of blood in the stool. He denies emphatically any abdominal pain, sensation of fullness and he denies any fever, chills. A few nights ago he had some night sweats but he assumes it is because he had too many blankets on him. He has not had any pruritus. He does not feel fatigued. He denies any cough or sputum production, no shortness of breath, no pleuritic pain. He has not had any rashes in the skin. He denies any joint pain or bone pain. He denies any neurological symptomatology. He has no urinary complaints with no frequency, urgency or hematuria. He denies any other alterations at this time.       No family history on file.     Review of Systems       General: no fever, no chills,  fatigue,no weight changes, no lack of appetite.  Eyes: no epiphora, xerophthalmia,conjunctivitis, pain, glaucoma, blurred vision, blindness, secretion, photophobia, proptosis, diplopia.  Ears: no otorrhea, tinnitus, otorrhagia, deafness, pain, vertigo.  Nose: no rhinorrhea, no epistaxis, no alteration in perception of odors, no sinuses pressure.  Mouth: no alteration in gums or teeth,  No ulcers, no difficulty with mastication or deglut ion, no odynophagia.  Neck: no masses or pain, no thyroid alterations, no pain in muscles or arteries, no carotid odynia, no crepitation.  Respiratory: no cough, no  "sputum production,no dyspnea,no trepopnea, no pleuritic pain,no hemoptysis.  Heart: no syncope, no irregularity, no palpitations, no angina,no orthopnea,no paroxysmal nocturnal dyspnea.  Vascular Venous: no tenderness,no edema,no palpable cords,no postphlebitic syndrome, no skin changes no ulcerations.  Vascular Arterial: no distal ischemia, noclaudication, no gangrene, no neuropathic ischemic pain, no skin ulcers, no paleness no cyanosis.  GI: no dysphagia, no odynophagia, no regurgitation, no heartburn,no indigestion,no nausea,no vomiting,no hematemesis ,no melena,no jaundice,no distention, no obstipation,no enterorrhagia,no proctalgia,no anal  lesions, no changes in bowel habits.early satiety  : no frequency, no hesitancy, no hematuria, no discharge,no  pain.  Musculoskeletal: no muscle or tendon pain or inflammation,no  joint pain, no edema, no functional limitation,no fasciculations, no mass.  Neurologic: no headache, no seizures, noalterations on Craneal nerves, no motor deficit, no sensory deficit, normal coordination, no alteration in memory,normal orientation, calculation,normal writting, verbal and written language.  Skin: no rashes,no pruritus no localized lesions.  Psychiatric: no anxiety, no depression,no agitation, no delusions, proper insight.    Objective     Vitals:    11/12/20 0858   BP: 114/70   Pulse: 101   Resp: 20   Temp: 98 °F (36.7 °C)   TempSrc: Temporal   SpO2: 96%   Weight: 119 kg (261 lb 11.2 oz)   Height: 193 cm (75.98\")   PainSc:   5   PainLoc: Abdomen  Comment: stomach especially at night     Current Status 11/12/2020   ECOG score 0       Physical Exam   I HAVE PERSONALLY REVIEWED THE HISTORY OF THE PRESENT ILLNESS, PAST MEDICAL HISTORY, FAMILY HISTORY, SOCIAL HISTORY, ALLERGIES, MEDICATIONS STATED ABOVE IN THE OFFICE NOTE FROM TODAY.    Patient screened  for depression based on a PHQ-9 score of 0 on 2/28/2020.   GENERAL:  Well-developed, well-nourished  Patient  in no acute " distress.   SKIN:  Warm, dry ,NO rashes,NO purpura ,NO petechiae.  HEENT:  Pupils were equal and reactive to light and accomodation, conjunctivae noninjected, no pterygium, normal extraocular movements, normal visual acuity.   NECK:  Supple with good range of motion; no thyromegaly or masses, no JVD or bruits, no cervical adenopathies.No carotid artery pain, no carotid abnormal pulsation , NO arterial dance.  LYMPHATICS:  No cervical, NO supraclavicular, NO axillary,NO epitrochlear , NO inguinal adenopathy.  CARDIAC   normal rate and regular rhythm, without murmur,NO rubs NO S3 NO S4 right or left . Normal femoral, popliteal, pedis, brachial and carotid pulses.  VASCULAR ARTERIAL: normal carotids,brachial,radial,femoral,popliteal, pedis pulses , no bruits.no paleness or cyanosis, no pain, no edema, no numbness, no gangrene.  VASCULAR VENOUS: no cyanosis, collateral circulation, varicosities, edema, palpable cords, pain, erythema.  ABDOMEN:  Soft, nontender with no hepatomegaly, no splenomegaly,VERY LARGE 30 CM DIAMETER ABDOMINAL MASS IN LUQ , no ascites, no collateral circulation,no distention,no Prosper sign, no abdominal pain, no inguinal hernias,no umbilical hernia, no abdominal bruits. Normal bowel sounds.  GENITAL: Not  Performed.  EXTREMITIES  AND SPINE:  No clubbing, cyanosis or edema, no deformities or pain .No kyphosis, scoliosis, deformities or pain in spine, ribs or pelvic bone.  NEUROLOGICAL:  Patient was awake, alert, oriented to time, person and place.        RECENT LABS:Pathology Exam: [id number]  Order: [id number]  Status:  Final result   Visible to patient:  No (not released)   Next appt:  Today at 08:30 AM in Lab (LAB CHAIR [facility])   Dx:  Left upper quadrant abdominal mass  Specimen Information: A: Abdomen; Tissue    [id number] 2: Abdomen        Component    Case Report   Surgical Pathology Report                         Case: [id number]                                   Authorizing Provider:   Severiano Rodriges MD        Collected:           11/03/2020 08:55 AM           Ordering Location:     [facility]  Received:            11/03/2020 09:36 AM                                  CT                                                                            Pathologist:           Ann-Marie Mcgrath MD                                                           Specimens:   1) - Abdomen, ct guided abdomen bx                                                                   2) - Abdomen, ct guided abdomen bx                                                         Clinical Information    Left upper quadrant abdominal mass   Final Diagnosis   1. Soft Tissue, Abdomen, CT Guided Core Biopsy:                A. Follicular lymphoma, grade III-A. Please see the attached [facility] consult report ([id number]).     2. Soft Tissue, Abdomen, CT Guided Core Biopsy (gross diagnosis only):               A. Core material submitted to [facility] for flow cytometric analysis.  Please see the flow cytometry                     summary below.     [facility]/jse    Electronically signed by Ann-Marie Mcgrath MD on 11/6/2020 at 1238   Preliminary Diagnosis   1. Soft Tissue, Abdomen, CT Guided Core Biopsy:                A. Mature non-Hodgkin B-cell lymphoma.     2. Soft Tissue, Abdomen, CT Guided Core Biopsy (gross diagnosis only):               A. Core material submitted to [facility] for flow cytometric analysis.  Please see the flow cytometry                     summary below.     [facility]/jse    Preliminary result electronically signed by Ann-Marie Mcgrath MD              Hematology WBC   Date Value Ref Range Status   11/12/2020 8.47 3.40 - 10.80 10*3/mm3 Final     RBC   Date Value Ref Range Status   11/12/2020 4.80 4.14 - 5.80 10*6/mm3 Final     Hemoglobin   Date Value Ref Range Status   11/12/2020 14.8 13.0 - 17.7 g/dL Final     Hematocrit   Date Value Ref Range Status   11/12/2020 42.5 37.5 - 51.0 % Final     Platelets   Date  Value Ref Range Status   11/12/2020 277 140 - 450 10*3/mm3 Final       CBC:    WBC   Date Value Ref Range Status   11/12/2020 8.47 3.40 - 10.80 10*3/mm3 Final     RBC   Date Value Ref Range Status   11/12/2020 4.80 4.14 - 5.80 10*6/mm3 Final     Hemoglobin   Date Value Ref Range Status   11/12/2020 14.8 13.0 - 17.7 g/dL Final     Hematocrit   Date Value Ref Range Status   11/12/2020 42.5 37.5 - 51.0 % Final     MCV   Date Value Ref Range Status   11/12/2020 88.5 79.0 - 97.0 fL Final     MCH   Date Value Ref Range Status   11/12/2020 30.8 26.6 - 33.0 pg Final     MCHC   Date Value Ref Range Status   11/12/2020 34.8 31.5 - 35.7 g/dL Final     RDW   Date Value Ref Range Status   11/12/2020 12.8 12.3 - 15.4 % Final     RDW-SD   Date Value Ref Range Status   11/12/2020 41.5 37.0 - 54.0 fl Final     MPV   Date Value Ref Range Status   11/12/2020 10.3 6.0 - 12.0 fL Final     Platelets   Date Value Ref Range Status   11/12/2020 277 140 - 450 10*3/mm3 Final     Neutrophil %   Date Value Ref Range Status   11/12/2020 79.0 (H) 42.7 - 76.0 % Final     Lymphocyte %   Date Value Ref Range Status   11/12/2020 5.7 (L) 19.6 - 45.3 % Final     Monocyte %   Date Value Ref Range Status   11/12/2020 6.4 5.0 - 12.0 % Final     Eosinophil %   Date Value Ref Range Status   11/12/2020 7.6 (H) 0.3 - 6.2 % Final     Basophil %   Date Value Ref Range Status   11/12/2020 0.8 0.0 - 1.5 % Final     Immature Grans %   Date Value Ref Range Status   11/12/2020 0.5 0.0 - 0.5 % Final     Neutrophils, Absolute   Date Value Ref Range Status   11/12/2020 6.70 1.70 - 7.00 10*3/mm3 Final     Lymphocytes, Absolute   Date Value Ref Range Status   11/12/2020 0.48 (L) 0.70 - 3.10 10*3/mm3 Final     Monocytes, Absolute   Date Value Ref Range Status   11/12/2020 0.54 0.10 - 0.90 10*3/mm3 Final     Eosinophils, Absolute   Date Value Ref Range Status   11/12/2020 0.64 (H) 0.00 - 0.40 10*3/mm3 Final     Basophils, Absolute   Date Value Ref Range Status   11/12/2020  0.07 0.00 - 0.20 10*3/mm3 Final     Immature Grans, Absolute   Date Value Ref Range Status   11/12/2020 0.04 0.00 - 0.05 10*3/mm3 Final     nRBC   Date Value Ref Range Status   11/12/2020 0.0 0.0 - 0.2 /100 WBC Final        CMP:    Glucose   Date Value Ref Range Status   10/29/2020 101 74 - 124 mg/dL Final     BUN   Date Value Ref Range Status   10/29/2020 20 6 - 20 mg/dL Final     Creatinine   Date Value Ref Range Status   10/29/2020 1.11 0.70 - 1.30 mg/dL Final   10/27/2020 1.20 0.60 - 1.30 mg/dL Final     Comment:     Serial Number: [id number]:  [id number]     Sodium   Date Value Ref Range Status   10/29/2020 137 134 - 145 mmol/L Final     Potassium   Date Value Ref Range Status   10/29/2020 4.1 3.5 - 4.7 mmol/L Final     Chloride   Date Value Ref Range Status   10/29/2020 98 98 - 107 mmol/L Final     CO2   Date Value Ref Range Status   10/29/2020 26.1 22.0 - 29.0 mmol/L Final     Calcium   Date Value Ref Range Status   10/29/2020 10.0 8.5 - 10.2 mg/dL Final     Total Protein   Date Value Ref Range Status   10/29/2020 7.8 6.3 - 8.0 g/dL Final     Albumin   Date Value Ref Range Status   10/29/2020 4.90 3.50 - 5.20 g/dL Final   10/29/2020 4.3 2.9 - 4.4 g/dL Final     ALT (SGPT)   Date Value Ref Range Status   10/29/2020 28 0 - 41 U/L Final     AST (SGOT)   Date Value Ref Range Status   10/29/2020 34 0 - 40 U/L Final     Alkaline Phosphatase   Date Value Ref Range Status   10/29/2020 64 38 - 116 U/L Final     Total Bilirubin   Date Value Ref Range Status   10/29/2020 0.5 0.2 - 1.2 mg/dL Final     eGFR  Am   Date Value Ref Range Status   02/24/2020 75 >60 mL/min/1.73 Final     Globulin   Date Value Ref Range Status   10/29/2020 2.9 1.8 - 3.5 gm/dL Final     A/G Ratio   Date Value Ref Range Status   10/29/2020 1.7 1.1 - 2.4 g/dL Final     BUN/Creatinine Ratio   Date Value Ref Range Status   10/29/2020 18.0 7.3 - 30.0 Final     Anion Gap   Date Value Ref Range Status   10/29/2020 12.9 5.0 - 15.0 mmol/L Final          Recent imaging:    Us Abdomen Complete    Result Date: 10/23/2020  Narrative: US ABDOMEN COMPLETE-  CLINICAL: Evaluate mass left abdomen.  FINDINGS: Only the proximal portion of the abdominal aorta could be visualized and is normal measuring 19 mm in diameter, mid and distal aorta obscured due to gas within the overlying bowel. Visualized inferior vena cava satisfactory in appearance.  Diffuse increased hepatic echotexture consistent with fatty infiltration. No focal liver lesion.  The right kidney is 12 cm in length and normal. The left kidney is 12.2 cm in length and normal. No calculus or obstructive uropathy. The gallbladder is satisfactory in appearance with the exception of a tiny polyp measuring 2 mm, no biliary duct dilatation, CBD is 5 mm.  Only a portion of the pancreatic body visualized and is satisfactory in appearance. Lateral to the pancreas on the left there is a solid mass which is lobulated in contour measuring 14.3 x 11.2 x 14.6 cm. I cannot determine if this is arising from or separate from the pancreas. Computed tomography of the abdomen and pelvis with intravenous contrast recommended as follow-up.  The spleen is satisfactory in shape and echotexture measuring 12.4 cm in length.  CONCLUSION: Corresponding to the palpable abnormality is a mass within the left abdomen, lateral to the pancreas, etiology is indeterminate. CT scan of the abdomen and pelvis with intravenous contrast recommended as follow-up. Diffusely increased hepatic echotexture consistent with fatty infiltration. No focal liver lesion seen.  This report was finalized on 10/23/2020 9:17 AM by Dr. Torrey Alatorre M.D.      Ct Abdomen Pelvis With Contrast    Result Date: 10/27/2020  Narrative: CT OF THE ABDOMEN AND PELVIS WITH CONTRAST 10/27/2020  HISTORY: Abdominal mass. Seen on ultrasound.  TECHNIQUE: Axial images were obtained from the lung bases to the symphysis pubis after intravenous contrast. No oral contrast was given.   FINDINGS:  The liver, gallbladder, spleen, pancreas, adrenals and kidneys appear unremarkable except for a small right renal cyst.  There is a very large solid soft tissue mass in the anterior leftward abdomen which measures up to 14.6 cm x 13.4 cm. This represents the mass seen on the ultrasound and is likely a very large lymph node. There are multiple other smaller but still slightly enlarged mesenteric nodes identified. These are seen in the mesentery and retroperitoneum. An example node is a portal caval node measuring 4.0 cm with another lymph node anterior to the lower pole left kidney measuring 3.1 cm and a left para-aortic node on image 77 measuring 3.1 cm as well.  There is a small amount of free fluid in the pelvis. The vascular structures enhance relatively normally.  No bowel wall thickening is seen. There is mild prostate gland enlargement.      Impression: 1. Very large anterior leftward abdominal mass is likely a very large lymph node mass. 2. There are multiple additional small to moderately enlarged lymph nodes throughout the mesentery and retroperitoneum. 3. These findings are almost certainly related to lymphoma. Further evaluation recommended. 4. The large abdominal mass would be easily accessible for CT-guided biopsy. 5. Small amount of free fluid is noted in the pelvis.  Radiation dose reduction techniques were utilized, including automated exposure control and exposure modulation based on body size.  This report was finalized on 10/27/2020 4:31 PM by Dr. Matthew Rivera M.D.      Ct Needle Biopsy Abdomen    Result Date: 11/3/2020  Narrative: PROCEDURE: CT guided abdominal mass biopsy  HISTORY: abd mass; R19.02-Left upper quadrant abdominal swelling, mass and lump  TECHNIQUE: Radiation dose reduction techniques were utilized, including automated exposure control and exposure modulation based on body size.  The procedural risks, benefits, and alternatives were discussed with the patient.  Informed consent was obtained.    The patient was placed in the supine position on the CT procedure table. Axial CT scan was performed to localize the mass in the left side of the abdomen. The overlying skin was prepped and draped in the usual sterile fashion. 1% buffered lidocaine was used for local anesthesia.  Next, a 17-gauge coaxial needle was advanced into the lesion under CT guidance. 2 18-gauge core(s) was then obtained and sent to pathology in formalin and for flow cytometry. The needle was removed and sterile dressing applied. No immediate complications.  FINDINGS: CT scan shows the biopsy needle within the mass in the left side of the abdomen.      Impression: Technically successful CT guided abdominal mass biopsy.        Radiation dose reduction techniques were utilized, including automated exposure control and exposure modulation based on body size.  This report was finalized on 11/3/2020 10:20 AM by Dr. Ricardo Almaguer M.D.      Nm Pet Skull Base To Mid Thigh    Result Date: 11/9/2020  Narrative: F-18 FDG PET FROM SKULL BASE TO MID THIGH WITH PET/CT FUSION  HISTORY: 60-year-old male with lymphoma. Staging.  TECHNIQUE: Radiation dose reduction techniques were utilized, including automated exposure control and exposure modulation based on body size. Blood glucose level at time of injection was 84 mg/dL.  6.7 mCi of F-18 FDG were injected and PET was performed from skull base to mid thigh. CT was obtained for localization and attenuation correction. Time at injection 8:17 AM. PET start time 9:42 AM. Compared with CT of the abdomen and pelvis from 10/27/2020.  FINDINGS: The approximately 16 cm lobular left mesenteric mass is intensely hypermetabolic with a maximal SUV of 50.3. There is also hypermetabolic lymphadenopathy at the retroperitoneum and there are hypermetabolic retrodiaphragmatic nodes and nodes at the epicardial fat pads. There is hypermetabolic activity at the omental nodularity and at the  nodularity along the left paracolic gutter. There is similar hypermetabolic activity at the thickened peritoneum at the dependent aspect of the pelvis with a maximal SUV of 15.2. There is hypermetabolic lymphadenopathy along both internal mammary chains and at the posterior mediastinum. There is a single hypermetabolic right supraclavicular node which measures approximately 1.2 x 1.0 cm and has a maximal SUV of 6.6. There is no hypermetabolic lymphadenopathy within the neck. Splenic size is normal and splenic activity is less intense than that of the liver. There is a fairly typical speckled pattern of activity throughout the liver and there is a solitary small hypermetabolic focus at the left hepatic lobe which corresponds to a nearly inconspicuous low-attenuation focus on the CT sequence, with a maximal SUV of 6.7. No suspicious bone activity is seen.      Impression: 1. Markedly intense hypermetabolic bulky left mesenteric mass and there is also hypermetabolic lymphadenopathy at the retroperitoneum, retrodiaphragmatic, epicardial fat pad, and right supraclavicular regions. 2. There is hypermetabolic activity at the omental and peritoneal thickening as described. The single hypermetabolic focus within the left hepatic lobe is suspicious for lymphoma involvement as well.  This report was finalized on 11/9/2020 3:11 PM by Dr. Galilea Batista M.D.           Assessment/Plan     Diagnoses and all orders for this visit:    1. Grade 3 follicular lymphoma of lymph nodes of multiple regions (CMS/HCC) (Primary)  -     CBC & Differential; Future  -     Comprehensive Metabolic Panel; Future  -     CBC & Differential; Future  -     Comprehensive Metabolic Panel; Future  -     CBC & Differential; Future  -     Comprehensive Metabolic Panel; Future  -     CBC & Differential; Future  -     Comprehensive Metabolic Panel; Future  -     Lactate Dehydrogenase; Future  -     Lactate Dehydrogenase; Future  -     Lactate Dehydrogenase;  Future  -     Uric Acid; Future  -     Uric Acid; Future  -     Uric Acid; Future  -     Adult Transthoracic Echo Complete W/ Cont if Necessary Per Protocol  -     Ambulatory Referral to Vascular Surgery    2. Left upper quadrant abdominal mass  -     CBC & Differential; Future  -     Comprehensive Metabolic Panel; Future  -     CBC & Differential; Future  -     Comprehensive Metabolic Panel; Future  -     CBC & Differential; Future  -     Comprehensive Metabolic Panel; Future  -     CBC & Differential; Future  -     Comprehensive Metabolic Panel; Future  -     Lactate Dehydrogenase; Future  -     Lactate Dehydrogenase; Future  -     Lactate Dehydrogenase; Future  -     Uric Acid; Future  -     Uric Acid; Future  -     Uric Acid; Future  -     Adult Transthoracic Echo Complete W/ Cont if Necessary Per Protocol  -     Ambulatory Referral to Vascular Surgery     In summary I have seen a very healthy 60-year-old individual who is not a smoker or a drinker who has hypertension and history of hyperlipidemia as well as previous history of hyperuricemia and gout who has been seen by primary Nurse Practitioner, Mr. James Epley, for routine assessment and was found to have a very large abdominal mass. A CT was performed, I have reviewed the CT scan in the PAC system [facility]. In deed there is a very large mass independent of the kidney, pancreas, spleen, stomach and intestine. This mass is probably representation of conglomerate of lymph nodes and probably represents a non-Hodgkin's lymphoma. The patient so far has no major symptoms besides early satiety. He tried to lose weight since last fall and he lost 25 pounds of weight until the pandemia in March and April. Since then his weight has stabilized. The rest of his clinical exam discloses no cervical, axillary or inguinal adenopathies. Again he has no obvious B symptoms.       I reviewed the patient back on 11/12/2020 with his wife in order to review the  PET scan and the pathology report of the abdominal mass. 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 The Ki-67 tumor is 30%.     I reviewed with the patient and his wife the PET scan. This is very dramatic in regard to the SUV activity uptake in the large abdominal mass and also documents retroperitoneal adenopathy, compromise of the peritoneum and also compromise of the internal mammary nodes. There is right supraclavicular adenopathy. The PET scan is more than striking.     I also reviewed the peripheral blood flow cytometry that is negative for monoclonal population of cells.     I reviewed the LDH that the patient had done during the original consultation and uric acid and those numbers were normal.     With all of the above in mind I do believe that the patient has a follicular lymphoma grade 3 bulky disease with extensive large mass in the abdomen and is a stage IV. I do believe that the patient will benefit greatly of chemotherapy regimen and I discussed the case with Reed Aguirre MD, and Ricardo Collins MD, in our office. Obviously the patient is at risk of tumor lysis syndrome and that is an issue that is concerning to me.    In preparation for definitive chemotherapy with CHOP and Rituxan I proposed to the patient and his wife the followin. He will need to have an echocardiogram before initiation of chemotherapy. I explained to him the reason behind that.   2. I want for him to remain on metoprolol that will be cardiac protection for the time being.   3. I would like for the patient to modify his dose of allopurinol to 300 mg a day starting as per today to prevent tumor lysis syndrome.  4. The patient will be educated about CHOP and Rituxan in the next day or so. I discussed with him side effects of the medicines including anemia, leukopenia, thrombocytopenia, peripheral neuropathy, allergic reactions,  fever and chills related to Rituxan administration.   5. I discussed with them the length of the 1st treatment that will require probably 6-7 hours in the office.  6. The patient has high risk of tumor lysis syndrome and for that reason he will come on day 2 and day 3 to receive 1000 cc of normal saline each one of those days and he will require BMP, uric acid, phosphorus and LDH each one of those days.   7. The patient will require Vascular Surgery consultation to proceed with port placement.   8. I indicated how a port is placed and I showed him the device.   9. The patient will be supported by Neulasta On-body and I explained to him how this device works and the side effects of Neulasta including bone pain that could be bad enough to require pain medicine.   10. I indicated to the patient that he will require chemotherapy treatment every 3 weeks for a total of 6 cycles and he will repeat a PET scan after cycle 2 and after cycle 6. He will have a CT scan after cycle 4.     I pointed out to him that the goal of this is cure.    I discussed with him some of the side effects of the medicines including alopecia. He is not bothered about that. His hair will grow back.     I asked them to try to stay away from the public in regard to his job environment to minimize any exposure to COVID infection under the present circumstances.     He will require weekly labs in the office thereafter after the 1st cycle to monitor the blood counts and tolerance to the regimen and monitor kidney function.           I DISCUSSED WITH PATIENT IN DETAIL FORMS TO DECREASE CHANCES OF CORONAVIRUS INFECTION INCLUDING ISOLATION, PROPER HAND HIGIENE, AVOID PUBLIC PLACES  WITH CROWDS, FOLLOW  CDC RECOMENDATIONS, AND KEEP PERSONAL AND SOCIAL RESPONSIBILITY, WARE A MASK IN PUBLIC PLACES.  PATIENT IS AWARE THIS INFECTION COULD HAVE SEVERE CONSEQUENCES TO PERSONAL HEALTH AND FAMILY RAMIFICATIONS OF THIS.

## 2020-11-13 NOTE — PROGRESS NOTES
This was an audio and video enabled telemedicine encounter.      ____________________PATIENT EDUCATION____________________    PATIENT EDUCATION:  Today I met with the patient to discuss the chemotherapy regimen recommended for treatment of his lymphoma.  The patient was given explanation of treatment premed side effects including office policy that prohibits patients to drive if sedating medications are administered, MD explanation given regarding benefits, side effects, toxicities and goals of treatment.  The patient received a Chemotherapy/Biotherapy Plan Summary including diagnosis and specific treatment plan.    SIDE EFFECTS:  Common side effects were discussed with the patient and/or significant other.  Discussion included hair loss/discoloration, anemia/fatigue, infection/chills/fever, appetite, bleeding risk/precautions, constipation, diarrhea, mouth sores, taste alteration, loss of appetite,nausea/vomiting, peripheral neuropathy, skin/nail changes, rash, muscle aches/weakness, photosensitivity, weight gain/loss, hearing loss, dizziness, menopausal symptoms, menstrrual irregularity, sterility, high blood pressure, heart damage, liver damage, lung damage, kidney damage, DVT/PE risk, fluid retention, pleural/pericardial effusion, somnolence, electrolyte/LFT imbalance, vein exercises and/or the possible need for vascular access/port placement.  The patient was advice that although uncommon, leakage of an infused medication from the vein or venous access device (port) may lead to skin breakdown and/or other tissue damage.  The patient was advised that he/she may have pain, bleeding, and/or bruising from the insertion of a needle in their vein or venous access device (port).  The patient was further advised that, in spite of proper technique, infection with redness and irritation may rarely occur at the site where the needle was inserted.  The patient was advised that if complications occur, additional medical  treatment is available.    Discussion also included side effects specific to drugs in the treatment plan, specifically cyclophosphamide, doxorubicin, Oncovin, prednisone, Rituxan, Neulasta.    Reproductive risks were discussed, including appropriate use of birth control and protection during sexual relations.    Patient did complain of intermittent issues with abdominal pain causing difficulty with sleeping at night.  He describes it as a cramping/burning sensation.  He states sometimes it goes away on its own, it seemed to last all night last night, and he did not sleep well.  The patient is asking for something to help with pain.    Survivorship referral placed if appropriate yes.    A total of 33 minutes were spent with the patient, with 100% of time spent in education and counseling.

## 2020-11-16 NOTE — ANESTHESIA PREPROCEDURE EVALUATION
Anesthesia Evaluation     Patient summary reviewed and Nursing notes reviewed   no history of anesthetic complications:  NPO Solid Status: > 8 hours  NPO Liquid Status: > 2 hours           Airway   Mallampati: III  TM distance: >3 FB  Neck ROM: full  Possible difficult intubation  Dental - normal exam     Pulmonary - negative pulmonary ROS and normal exam   (-) COPD, asthma, not a smoker, lung cancer    ROS comment: No dx/o DIOGENES but STOP BANG score 6  Cardiovascular - normal exam  Exercise tolerance: good (4-7 METS)    ECG reviewed  Rhythm: regular  Rate: normal    (+) hypertension well controlled less than 2 medications, hyperlipidemia,   (-) valvular problems/murmurs, past MI, CAD, dysrhythmias, angina, CHF, cardiac stents, CABG    ROS comment: TTE 11/2020:  ·Calculated left ventricular EF = 56.9% Estimated left ventricular EF = 57% Estimated left ventricular EF was in agreement with the calculated left ventricular EF. Left ventricular systolic function is normal. Image quality is adequate to assess global longitudinal LV strain accurately. Normal left ventricular cavity size and wall thickness noted. All left ventricular wall segments contract normally.  ·Poor 2D imaging of cardiac valves that appear to function normally by Doppler assessment    Neuro/Psych- negative ROS  (-) seizures, TIA, CVA  GI/Hepatic/Renal/Endo    (+) obesity,   renal disease CRI,   (-) morbid obesity, hiatal hernia, GERD, PUD, hepatitis, liver disease, GI bleed    Musculoskeletal (-) negative ROS    Abdominal  - normal exam   Substance History - negative use     OB/GYN negative ob/gyn ROS         Other      history of cancer active      Other Comment: Recent dx/o NHL                  Anesthesia Plan    ASA 3     MAC     intravenous induction     Anesthetic plan, all risks, benefits, and alternatives have been provided, discussed and informed consent has been obtained with: patient.    Plan discussed with CRNA and attending.

## 2020-11-16 NOTE — ANESTHESIA POSTPROCEDURE EVALUATION
"Patient: Jake Saunders    Procedure Summary     Date: 11/16/20 Room / Location: [facility] OR  / [facility] MAIN OR    Anesthesia Start: 1510 Anesthesia Stop: 1600    Procedure: mediport placement (N/A ) Diagnosis:     Surgeon: Caitlin Bethea Jr., MD Provider: Benji Oakley MD    Anesthesia Type: MAC ASA Status: 3          Anesthesia Type: MAC    Vitals  Vitals Value Taken Time   /96 11/16/20 1615   Temp 36.8 °C (98.3 °F) 11/16/20 1558   Pulse 86 11/16/20 1615   Resp 16 11/16/20 1615   SpO2 94 % 11/16/20 1615           Post Anesthesia Care and Evaluation    Patient location during evaluation: bedside  Patient participation: complete - patient participated  Level of consciousness: awake and alert  Pain management: adequate  Airway patency: patent  Anesthetic complications: No anesthetic complications    Cardiovascular status: acceptable  Respiratory status: acceptable  Hydration status: acceptable    Comments: /96   Pulse 86   Temp 36.8 °C (98.3 °F) (Oral)   Resp 16   Ht 193 cm (76\")   Wt 118 kg (259 lb 11.2 oz)   SpO2 94%   BMI 31.61 kg/m²       "

## 2020-11-16 NOTE — H&P
History and physical examination    History of present illness: The patient is a 60-year-old gentleman with stage IV lymphoma, biopsy-proven grade 3A follicular lymphoma in need of access for chemotherapy.  He had early satiety as the reason for the CT scan with which he was found to have a large conglomerate of lymph nodes.  He has no history of upper extremity DVT or superficial thrombophlebitis.  No arm claudication symptoms.  Plans are for him to start chemotherapy tomorrow.    Past medical history, social history, family history, review of systems are all well documented in his chart from the oncology service.    Physical examination: Well-developed, well-nourished, gentleman in no acute distress awake alert and oriented x3  HEENT: Normocephalic and atraumatic, extraocular movements intact, sclera nonicteric  Neck: Supple, full range of motion, no JVD no adenopathy.  Chest: Equal bilateral expansion and symmetrical.  Unlabored breathing.  Clear to auscultation  Heart: Normal sinus rhythm without murmur  Abdomen: Soft, benign but there is a palpable mass noted in the central portion of the abdomen supraumbilical  Neuro: Grossly intact without focal deficit.  Extremities: No clubbing cyanosis or edema.  The pulses noted throughout both upper and lower extremities.    Impression: Grade 3A follicular lymphoma, stage IV, in need of access for chemotherapy.    Plan: Port placement with fluoroscopy.  The procedure risk benefits have been discussed in detail with him and he is in agreement with the plan.

## 2020-11-16 NOTE — OP NOTE
Operative Note  Date of Admission:  11/16/2020  OR Date: 11/16/2020    Pre-op Diagnosis:   Grade 3A follicular lymphoma, stage IV, and need of access for chemotherapy    Post-op Diagnosis:     Same    Procedure:   1) Duplex ultrasound guided percutaneous access of the right internal jugular vein with placement of right internal jugular PowerPort with fluoroscopy    Surgeon: Caitlin Bethea Jr, MD    Assistant: Anh Adame RN    Anesthesia: Monitored Anesthesia Care    Staff:   Circulator: Francisco Mitchell RN  Scrub Person: Benji Ramirez  Orientee: Julianna Yang    Estimated Blood Loss: Minimal    Specimens:   None    Complications: None    Findings: Catheter tip in the superior vena cava near the right atrium.  No pneumothorax.  No kinking of the catheter.    Indications:  As in preop diagnosis    Procedure: The patient was placed on the operating table in the supine position.  Intravenous sedation was then given.  The patient was prepped from the chin, shoulder to shoulder, to the nipple line using ChloraPrep and draped in the usual sterile fashion.  Using duplex ultrasound the right internal jugular vein was identified.  Local anesthetic was infiltrated into the skin and subcutaneous tissue just cephalad to the probe.  The vein was accessed percutaneously under direct vision.  A guidewire was advanced.  Fluoroscopy confirmed the position to be in the superior vena cava.  More local anesthetic was infiltrated into the skin and subtendinous tissue inferior and lateral to the clavicle.  The bridge between the guidewire exit site and this location was anesthetized as well.  A transverse incision was made on the chest and the electrocautery was used to dissected the subcutaneous tissue down to the pectoralis fascia.  A subcutaneous pocket was fashioned.  The port was assembled and placed into the port.  It was brought through a subcutaneous tunnel to the guidewire exit site.  A dilator and sheath were placed  over the guidewire.  The dilator and guidewire were removed and the catheter was cut to the appropriate length and placed through the sheath and the sheath removed.  There was easy aspiration and flow through the port.  The port was flushed with heparinized saline solution and 2000 units of heparin instilled.  The port was secured to the fascia with 3-0 Prolene suture.  Fluoroscopy confirmed satisfactory positioning of the port with no pneumothorax.  No kinking of the catheter.  The catheter tip was in the superior vena cava near the right atrium.  The subcutaneous tissue and both incisions were closed with 3-0 Vicryl suture and the skin was closed with a running 4-0 Vicryl in a subcuticular fashion.  Dermabond was placed over each incision.  The port was then accessed with an angled Brooks needle and there was easy aspiration and flow.  It was flushed with heparinized saline solution.  Sterile dressings were applied over this.  Sponge, needle, and estimate counts were all reported as correct.  The patient was then transported to the recovery room in  satisfactory condition.        There are no hospital problems to display for this patient.     Caitlin Bethea Jr., MD     Date: 11/16/2020  Time: 15:43 EST

## 2020-11-16 NOTE — DISCHARGE INSTRUCTIONS
[facility]  [place], Sanjay Matias [place], [place], Thomas  [address]  [place] [address]  [phone]  Discharge Instructions for Port Placement    • Go home, rest and take it easy today.    • You may experience some dizziness or memory loss from the anesthesia.  This may last for the next 24 hours.  Someone should plan on staying with you for the first 24 hours for your safety.  • Do not make any important legal decisions or sign any legal papers for the next 24 hours.    • Eat and drink lightly today.  Start off with liquids, jello, soup, crackers or other bland foods at first. You may advance your diet tomorrow as tolerated as long as you do not experience any nausea or vomiting.   • If skin glue was used, your incision will be open to air.  No care is required.  If you have a dressing, you may remove it in 2-3 days or until your first treatment whichever comes first. If you have the little white tapes known as steri-strips, leave them alone.  They usually fall off in 1-2 weeks.  Do not worry if they come off sooner.   • You may notice some bleeding/drainage. A little bloody drainage is normal.  Some bruising is also normal.  • You may shower tomorrow.  No tub baths until your incisions are completely healed.  • You have received a prescription for a narcotic pain medicine, as you may have some pain/discomfort following surgery.   You will not be totally pain free, but your pain medicine should make the pain tolerable.  Please take your pain medicine as prescribed and always take your pills with food to prevent nausea. If you are having severe pain that cannot be controlled by the pain medicine, please contact me.  If the pain is such that narcotic pain medicine is not required, you may take Tylenol or Ibuprofen as directed unless indicated otherwise.    • No driving for 24 hours and for as long as you are taking your prescription pain medicine.      Remember to  contact me for any of the following:    • Fever> 101 degrees  • Severe pain that cannot be controlled by taking your pain pills  • Severe nausea or vomiting   • Significant bleeding from your incision  • Drainage that has a bad smell or is yellow or green in appearance  • Any other questions or concerns

## 2020-11-17 NOTE — PROGRESS NOTES
Pt here for 1st [facility] R today. Pt educated and consents signed. Pt watched Neulasta video. All questions answered. Pt v/u.    Port accessed per OR from port placement yesterday. Patent with brisk blood return. Port de-accessed post treatment per pt request and will need to be re-accessed prior to IVF's tomorrow. Occlusive dressing applied d/t new incision.    Tolerated treatment well. Wife notified when patient finished and waiting on 1st floor to drive him home.

## 2020-11-19 NOTE — NURSING NOTE
Discussed labs with Dr. Rodriges, he explained that labs do not indicate concern of tumor lysis-aeb K+ lab stable along with baseline LDH level decreasing from lab on 11/18.  Pt increase in WBC and ANC indicates neulasta effective.  Per Dr. Rodriges, pt to continue taking allopurinol daily, to increase p.o. fluids, avoid K+ rich foods or drinks, and pt to return for labs and RN review on Monday.  Pt informed of these instructions and message sent to scheduling for Monday appt.  Pt given printed copy of labs with MD instructions written out for clarity.  Pt v/u.

## 2020-11-19 NOTE — TELEPHONE ENCOUNTER
----- Message from Perri Lopez RN sent at 11/19/2020  3:11 PM EST -----  Per Dr. Rodriges, pt needs labs and RN review set for Monday.  Please call with appt time.    Thanks,  Beatriz

## 2020-11-23 NOTE — TELEPHONE ENCOUNTER
Returned pt wife , Aubrie, to discuss need for covid test due to recent exposure.  I instructed both patient and wife that it was his decision if he wants to be tested.  Pt does not have any current symptoms.  They V/U

## 2020-11-23 NOTE — PROGRESS NOTES
Pt calling because he had appointment today but found out he had been exposed to covid  Pt will reschedule appointment and get tested for covid

## 2020-11-23 NOTE — TELEPHONE ENCOUNTER
----- Message from Dorina Wilkins RN sent at 11/23/2020 11:01 AM EST -----  Please cancel pt appointment today, pt has had covid exposure.   Pt will reschedule

## 2020-12-10 NOTE — PROGRESS NOTES
Patient here for Neulasta injection.  Had Onpro placed and had a malfunction, returned to pharmacy.  Patient states, started to infuse and stopped, had blinking red light and was buzzing, did not completely infuse.

## 2020-12-10 NOTE — TELEPHONE ENCOUNTER
Neulasta on pro did not work. Flashed red then turned red. Did not infuse. instructed to come in today and bring with him. In basket message to scheduling.

## 2020-12-10 NOTE — TELEPHONE ENCOUNTER
----- Message from Edna Snow RN sent at 12/10/2020  8:10 AM EST -----  Needs to be on injection or infusion schedule at 3 neulasta did not work.

## 2020-12-16 NOTE — NURSING NOTE
Lab Results   Component Value Date    WBC 2.95 (L) 12/16/2020    HGB 11.9 (L) 12/16/2020    HCT 34.4 (L) 12/16/2020    MCV 86.9 12/16/2020     12/16/2020     Pt is here for lab with RN review.  CBC reviewed with pt, counts are stable for this pt at this time. Pt has no complaints.  Copy of labs given to pt and f/u appt reviewed. Pt is instructed to call the office with any concerns or new symptoms prior to next visit. Pt vu

## 2020-12-29 NOTE — PROGRESS NOTES
Subjective         REASON FOR FOLLOW UP:  Large abdominal mass most likely lymphoma: biopsy shows grade 3 follicular lymphoma stage IV by PET SCAN    HISTORY OF PRESENT ILLNESS:   PATIENT WAS CALLED THE DAY BEFORE BY THE OFFICE TO ASK FOR SYMPTOMS THAT COULD BE CONSISTENT WITH CORONAVIRUS INFECTION, AND BEING NEGATIVE WAS SCHEDULED TO BE SEEN IN THE OFFICE TODAY. SIMILAR QUESTIONING TODAY INCLUDING, CHILLS, FEVER, NEW COUGH, SHORTNESS OF BREATH, DIARRHEA,DIFFUSE BODY ACHES  AND CHANGES IN SMELL OR TASTE WERE NEGATIVE.THE PATIENT DENIED ANY CONTACT WITH PERSONS WHO WERE POSITIVE FOR COVID, AND PATIENT IS NOT IN CATEGORY OF HIGH RISK BEHAVIOR TO ACQUIRE COVID.    DURING THE VISIT WITH THE PATIENT TODAY , PATIENT HAD FACE MASK, MY MEDICAL ASSISTANT AND I  HAD PROPPER PROTECTIVE EQUIPMENT, AND I DID HAND HYGIENE WITH SOAP AND WATER BEFORE AND AFTER THE VISIT.    This patient returns today to the office in company of his wife stating that he has been feeling terrific during the last couple of weeks. He has noticed that his stomach capacity has increased and he has been extremely hungry. He is not having early satiety like he was having before because of very dramatic resolution of his large abdominal mass. This mass was pressing on his stomach. He has not had any nausea or vomiting. He developed some minor constipation by day 5 after chemotherapy and we have given him some tricks in regard how to control this issue. His urination is normal. He has not had any neuropathy. He has not had any bone pain associated with Neulasta use. He feels better and stronger and he has been able to function at home but he has to go sometimes to his office but nobody else is in that location. 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          Past Medical History:   Diagnosis Date   • Allergic rhinitis    • Gout    • Grade 3 follicular lymphoma of lymph nodes of multiple regions (CMS/[facility]) 2020   • H/O foreign  travel 09/2020    [facility] [place]   • Hyperlipidemia    • Hypertension         Past Surgical History:   Procedure Laterality Date   • COLONOSCOPY  2014    normal   • TONSILLECTOMY     • VENOUS ACCESS DEVICE (PORT) INSERTION N/A 11/16/2020    Procedure: mediport placement;  Surgeon: Caitlin Bethea Jr., MD;  Location: [facility] OR;  Service: Vascular;  Laterality: N/A;        Current Outpatient Medications on File Prior to Visit   Medication Sig Dispense Refill   • allopurinol (ZYLOPRIM) 300 MG tablet Take 1 tablet by mouth Daily. 90 tablet 3   • cetirizine (zyrTEC) 10 MG tablet Take 1 tablet by mouth Daily. 90 tablet 1   • HYDROcodone-acetaminophen (NORCO) 5-325 MG per tablet Take 1 tablet by mouth Every 6 (Six) Hours As Needed for Moderate Pain . 30 tablet 0   • metoprolol succinate XL (TOPROL-XL) 50 MG 24 hr tablet TAKE 1 TABLET BY MOUTH DAILY 90 tablet 1   • mupirocin (Bactroban) 2 % ointment Apply  topically to the appropriate area as directed 3 (Three) Times a Day. 15 g 1   • Olmesartan-amLODIPine-HCTZ 40-5-25 MG tablet Take 1 tablet by mouth Daily. 90 tablet 1   • ondansetron (ZOFRAN) 8 MG tablet Take 1 tablet by mouth Every 8 (Eight) Hours As Needed for Nausea or Vomiting. 30 tablet 5   • predniSONE (DELTASONE) 50 MG tablet Take it every 3 weeks along with chemotherapy iv medication and for 5 days 10 tablet 5     No current facility-administered medications on file prior to visit.         ALLERGIES:  No Known Allergies     Social History     Socioeconomic History   • Marital status:      Spouse name: Aubrie   • Number of children: 2   • Years of education: College   • Highest education level: Not on file   Occupational History   • Occupation: Sales     Employer: [employer]   Tobacco Use   • Smoking status: Never Smoker   • Smokeless tobacco: Never Used   Substance and Sexual Activity   • Alcohol use: Yes     Alcohol/week: 6.0 - 8.0 standard drinks     Types: 6 - 8 Cans of beer per week      Comment: 2 times per week   • Drug use: No   • Sexual activity: Defer      ONCOLOGIC HISTORY:The patient is a 60 y.o. year old male who is here for an opinion about the above issue.  I had the opportunity to see this delightful individual in consultation along with his wife today in the office a 60-year-old white male who came to Mr. James Epley, APRN, at [facility] for routine assessment every 6 month visit and upon clinical examination he was found to have a very large abdominal mass. Obviously a CT scan was performed that documents a very large mass that is close to the stomach independent of the spleen, pancreas, left kidney and bowel and probably representing a conglomerate of masses and tumors that probably represents a non-Hodgkin's lymphoma. The patient states that he has had some early satiety in the last several months and he is not eating as much as he used to. He also has mild element of constipation, his bowel movement is harder than usual once or twice a day. He has not seen any passage of blood in the stool. He denies emphatically any abdominal pain, sensation of fullness and he denies any fever, chills. A few nights ago he had some night sweats but he assumes it is because he had too many blankets on him. He has not had any pruritus. He does not feel fatigued. He denies any cough or sputum production, no shortness of breath, no pleuritic pain. He has not had any rashes in the skin. He denies any joint pain or bone pain. He denies any neurological symptomatology. He has no urinary complaints with no frequency, urgency or hematuria. He denies any other alterations at this time.     In Denise reviewed the patient back on 11/12/2020 with his wife in order to review the PET scan and the pathology report of the abdominal mass. 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 The Ki-67 tumor is 30%.     I  reviewed with the patient and his wife the PET scan. This is very dramatic in regard to the SUV activity uptake in the large abdominal mass and also documents retroperitoneal adenopathy, compromise of the peritoneum and also compromise of the internal mammary nodes. There is right supraclavicular adenopathy. The PET scan is more than striking. eparation for definitive chemotherapy with CHOP and Rituxan I proposed to the patient and his wife the followin. He will need to have an echocardiogram before initiation of chemotherapy. I explained to him the reason behind that.   2. I want for him to remain on metoprolol that will be cardiac protection for the time being.   3. I would like for the patient to modify his dose of allopurinol to 300 mg a day starting as per today to prevent tumor lysis syndrome.  4. The patient will be educated about CHOP and Rituxan in the next day or so. I discussed with him side effects of the medicines including anemia, leukopenia, thrombocytopenia, peripheral neuropathy, allergic reactions, fever and chills related to Rituxan administration.   5. I discussed with them the length of the 1st treatment that will require probably 6-7 hours in the office.  6. The patient has high risk of tumor lysis syndrome and for that reason he will come on day 2 and day 3 to receive 1000 cc of normal saline each one of those days and he will require BMP, uric acid, phosphorus and LDH each one of those days.   7. The patient will require Vascular Surgery consultation to proceed with port placement.   8. I indicated how a port is placed and I showed him the device.   9. The patient will be supported by Neulasta On-body and I explained to him how this device works and the side effects of Neulasta including bone pain that could be bad enough to require pain medicine.   10. I indicated to the patient that he will require chemotherapy treatment every 3 weeks for a total of 6 cycles and he will repeat a PET  scan after cycle 2 and after cycle 6. He will have a CT scan after cycle 4.         Family History   Problem Relation Age of Onset   • Rheum arthritis Brother         Review of Systems       General: no fever, no chills, LESSER fatigue,no weight changes, no lack of appetite.  Eyes: no epiphora, xerophthalmia,conjunctivitis, pain, glaucoma, blurred vision, blindness, secretion, photophobia, proptosis, diplopia.  Ears: no otorrhea, tinnitus, otorrhagia, deafness, pain, vertigo.  Nose: no rhinorrhea, no epistaxis, no alteration in perception of odors, no sinuses pressure.  Mouth: no alteration in gums or teeth,  No ulcers, no difficulty with mastication or deglut ion, no odynophagia.  Neck: no masses or pain, no thyroid alterations, no pain in muscles or arteries, no carotid odynia, no crepitation.  Respiratory: no cough, no sputum production,no dyspnea,no trepopnea, no pleuritic pain,no hemoptysis.  Heart: no syncope, no irregularity, no palpitations, no angina,no orthopnea,no paroxysmal nocturnal dyspnea.  Vascular Venous: no tenderness,no edema,no palpable cords,no postphlebitic syndrome, no skin changes no ulcerations.  Vascular Arterial: no distal ischemia, noclaudication, no gangrene, no neuropathic ischemic pain, no skin ulcers, no paleness no cyanosis.  GI: no dysphagia, no odynophagia, no regurgitation, no heartburn,no indigestion,no nausea,no vomiting,no hematemesis ,no melena,no jaundice,no distention, no obstipation,no enterorrhagia,no proctalgia,no anal  lesions, no changes in bowel habits.  : no frequency, no hesitancy, no hematuria, no discharge,no  pain.  Musculoskeletal: no muscle or tendon pain or inflammation,no  joint pain, no edema, no functional limitation,no fasciculations, no mass.  Neurologic: no headache, no seizures, noalterations on Craneal nerves, no motor deficit, no sensory deficit, normal coordination, no alteration in memory,normal orientation, calculation,normal writting, verbal and  "written language.  Skin: no rashes,no pruritus no localized lesions.  Psychiatric: no anxiety, no depression,no agitation, no delusions, proper insight.            Objective     Vitals:    12/29/20 0801   BP: 101/71   Pulse: 98   Resp: 19   Temp: 97.3 °F (36.3 °C)   TempSrc: Temporal   SpO2: 97%   Weight: 116 kg (256 lb 3.2 oz)   Height: 193 cm (75.98\")   PainSc: 0-No pain     Current Status 12/29/2020   ECOG score 0       Physical Exam       I HAVE PERSONALLY REVIEWED THE HISTORY OF THE PRESENT ILLNESS, PAST MEDICAL HISTORY, FAMILY HISTORY, SOCIAL HISTORY, ALLERGIES, MEDICATIONS STATED ABOVE IN THE OFFICE NOTE FROM TODAY.        GENERAL:  Well-developed, well-nourished  Patient  in no acute distress.   SKIN:  Warm, dry ,NO rashes,NO purpura ,NO petechiae.  HEENT:  Pupils were equal and reactive to light and accomodation, conjunctivae noninjected, no pterygium, normal extraocular movements, normal visual acuity.   NECK:  Supple with good range of motion; no thyromegaly or masses, no JVD or bruits, no cervical adenopathies.No carotid artery pain, no carotid abnormal pulsation , NO arterial dance.  LYMPHATICS:  No cervical, NO supraclavicular, NO axillary,NO epitrochlear , NO inguinal adenopathy.  CARDIAC   normal rate and regular rhythm, without murmur,NO rubs NO S3 NO S4 right or left . Normal femoral, popliteal, pedis, brachial and carotid pulses.  VASCULAR ARTERIAL: normal carotids,brachial,radial,femoral,popliteal, pedis pulses , no bruits.no paleness or cyanosis, no pain, no edema, no numbness, no gangrene.  VASCULAR VENOUS: no cyanosis, collateral circulation, varicosities, edema, palpable cords, pain, erythema.  ABDOMEN:  Soft, nontender with no hepatomegaly, no splenomegaly,no masses, no ascites, no collateral circulation,no distention,no Jackson sign, no abdominal pain, no inguinal hernias,no umbilical hernia, no abdominal bruits. Careful methodic examination on his abdomen documented a mass that has " dramatically reduced. His mass used to be almost 25 cm across in the left lower quadrant of the abdomen. Now we have a remnant mass that is almost 7 cm in diameter that is smooth, uniform, mobile, nontender in the left upper quadrant very close to the rib cage. He has no liver enlargement. He has no splenomegaly. No collateral circulation and no obvious abdominal pain. No periumbilical adenopathies.      GENITAL: Not  Performed.  EXTREMITIES  AND SPINE:  No clubbing, cyanosis or edema, no deformities or pain .No kyphosis, scoliosis, deformities or pain in spine, ribs or pelvic bone.  NEUROLOGICAL:  Patient was awake, alert, oriented to time, person and place.          RECENT LABS:        Hematology WBC   Date Value Ref Range Status   12/29/2020 5.68 3.40 - 10.80 10*3/mm3 Final     RBC   Date Value Ref Range Status   12/29/2020 3.81 (L) 4.14 - 5.80 10*6/mm3 Final     Hemoglobin   Date Value Ref Range Status   12/29/2020 11.7 (L) 13.0 - 17.7 g/dL Final     Hematocrit   Date Value Ref Range Status   12/29/2020 34.1 (L) 37.5 - 51.0 % Final     Platelets   Date Value Ref Range Status   12/29/2020 286 140 - 450 10*3/mm3 Final       CBC:    WBC   Date Value Ref Range Status   12/29/2020 5.68 3.40 - 10.80 10*3/mm3 Final     RBC   Date Value Ref Range Status   12/29/2020 3.81 (L) 4.14 - 5.80 10*6/mm3 Final     Hemoglobin   Date Value Ref Range Status   12/29/2020 11.7 (L) 13.0 - 17.7 g/dL Final     Hematocrit   Date Value Ref Range Status   12/29/2020 34.1 (L) 37.5 - 51.0 % Final     MCV   Date Value Ref Range Status   12/29/2020 89.5 79.0 - 97.0 fL Final     MCH   Date Value Ref Range Status   12/29/2020 30.7 26.6 - 33.0 pg Final     MCHC   Date Value Ref Range Status   12/29/2020 34.3 31.5 - 35.7 g/dL Final     RDW   Date Value Ref Range Status   12/29/2020 15.5 (H) 12.3 - 15.4 % Final     RDW-SD   Date Value Ref Range Status   12/29/2020 48.0 37.0 - 54.0 fl Final     MPV   Date Value Ref Range Status   12/29/2020 9.9 6.0  - 12.0 fL Final     Platelets   Date Value Ref Range Status   12/29/2020 286 140 - 450 10*3/mm3 Final     Neutrophil %   Date Value Ref Range Status   12/29/2020 81.7 (H) 42.7 - 76.0 % Final     Lymphocyte %   Date Value Ref Range Status   12/29/2020 3.7 (L) 19.6 - 45.3 % Final     Monocyte %   Date Value Ref Range Status   12/29/2020 11.3 5.0 - 12.0 % Final     Eosinophil %   Date Value Ref Range Status   12/29/2020 0.7 0.3 - 6.2 % Final     Basophil %   Date Value Ref Range Status   12/29/2020 1.2 0.0 - 1.5 % Final     Immature Grans %   Date Value Ref Range Status   12/29/2020 1.4 (H) 0.0 - 0.5 % Final     Neutrophils, Absolute   Date Value Ref Range Status   12/29/2020 4.64 1.70 - 7.00 10*3/mm3 Final     Lymphocytes, Absolute   Date Value Ref Range Status   12/29/2020 0.21 (L) 0.70 - 3.10 10*3/mm3 Final     Monocytes, Absolute   Date Value Ref Range Status   12/29/2020 0.64 0.10 - 0.90 10*3/mm3 Final     Eosinophils, Absolute   Date Value Ref Range Status   12/29/2020 0.04 0.00 - 0.40 10*3/mm3 Final     Basophils, Absolute   Date Value Ref Range Status   12/29/2020 0.07 0.00 - 0.20 10*3/mm3 Final     Immature Grans, Absolute   Date Value Ref Range Status   12/29/2020 0.08 (H) 0.00 - 0.05 10*3/mm3 Final     nRBC   Date Value Ref Range Status   12/29/2020 0.0 0.0 - 0.2 /100 WBC Final        CMP:    Glucose   Date Value Ref Range Status   12/29/2020 113 74 - 124 mg/dL Final     BUN   Date Value Ref Range Status   12/29/2020 15 6 - 20 mg/dL Final     Creatinine   Date Value Ref Range Status   12/29/2020 1.14 0.70 - 1.30 mg/dL Final   12/23/2020 1.30 0.60 - 1.30 mg/dL Final     Comment:     Serial Number: [id number]:  [id number]     Sodium   Date Value Ref Range Status   12/29/2020 142 134 - 145 mmol/L Final     Potassium   Date Value Ref Range Status   12/29/2020 3.8 3.5 - 4.7 mmol/L Final     Chloride   Date Value Ref Range Status   12/29/2020 102 98 - 107 mmol/L Final     CO2   Date Value Ref Range Status    12/29/2020 29.0 22.0 - 29.0 mmol/L Final     Calcium   Date Value Ref Range Status   12/29/2020 9.5 8.5 - 10.2 mg/dL Final     Total Protein   Date Value Ref Range Status   12/29/2020 7.4 6.3 - 8.0 g/dL Final     Albumin   Date Value Ref Range Status   12/29/2020 4.40 3.50 - 5.20 g/dL Final     ALT (SGPT)   Date Value Ref Range Status   12/29/2020 22 0 - 41 U/L Final     AST (SGOT)   Date Value Ref Range Status   12/29/2020 22 0 - 40 U/L Final     Alkaline Phosphatase   Date Value Ref Range Status   12/29/2020 56 38 - 116 U/L Final     Total Bilirubin   Date Value Ref Range Status   12/29/2020 0.3 0.2 - 1.2 mg/dL Final     eGFR  Am   Date Value Ref Range Status   02/24/2020 75 >60 mL/min/1.73 Final     Globulin   Date Value Ref Range Status   12/29/2020 3.0 1.8 - 3.5 gm/dL Final     A/G Ratio   Date Value Ref Range Status   12/29/2020 1.5 1.1 - 2.4 g/dL Final     BUN/Creatinine Ratio   Date Value Ref Range Status   12/29/2020 13.2 7.3 - 30.0 Final     Anion Gap   Date Value Ref Range Status   12/29/2020 11.0 5.0 - 15.0 mmol/L Final         CT CHEST, ABDOMEN AND PELVIS WITH CONTRAST     HISTORY: 60-year-old male with follow-up for grade 3 follicular  lymphoma. For restaging.     TECHNIQUE: Axial CT images of the chest abdomen and pelvis were obtained  following administration of intravenous and oral contrast. Coronal and  sagittal reconstructions were then obtained.     COMPARISON: PET/CT dated 11/05/2020.     FINDINGS:     CT CHEST: Interval decrease in right supraclavicular lymphadenopathy.  The right supraclavicular lymph node now measures 5 mm in comparison to  prior measurement of 1 cm in short axis dimension. There is no  pathological mediastinal lymphadenopathy. The cardiophrenic  lymphadenopathy has also resolved in the interim. Small retrocrural  lymph nodes have also decreased in size. Bilateral internal mammary  lymphadenopathy has also decreased in the interim. Small prevascular  lymph nodes seen  in the superior mediastinum have also decreased in  size. No pathological axillary lymphadenopathy is seen. No pleural or  pericardial effusion. Elevated left hemidiaphragm is again seen. The  central airways are patent without endobronchial lesion. No suspicious  pulmonary nodules or focal airspace disease. T11 vertebral body  hemangioma is seen. A right-sided chest wall port has its tip at the  cavoatrial junction.     CT ABDOMEN AND PELVIS: There has been interval decrease in size of the  large mesenteric lymph moira mass. The mass today measures approximately  10.8 x 11.6 cm in a location where it previously measured 14.6 x 13.4  cm. Additional smaller lymph nodes have also decreased in the interim.  Mild stranding is seen within the omentum particularly well demonstrated  within the left upper quadrant on image 32 has also decreased in the  interim. Numerous additional retroperitoneal lymph nodes have also  decreased in the interim with index aortocaval lymph node today  measuring 7 mm in short axis dimension compared to prior measurement of  1.3 cm. Small amount of fluid is seen within the pelvis.  The previously  demonstrated peritoneal thickening is no longer well demonstrated.     The liver demonstrates normal attenuation. At the site of previously  demonstrated hypermetabolic liver lesion there may be mild heterogeneity  however no definite lesion is identified. The gallbladder, spleen, and  the pancreas are normal. Bilateral adrenal glands and kidneys are  stable. There is a small cyst within the midpole of the right kidney.  The urinary bladder is partially distended and normal. The prostate  gland is mildly enlarged. No evidence of bowel obstruction. There is  mild wall thickening seen at the gastric fundus that is unchanged from  prior examination.     IMPRESSION:  Response to interim treatment with interval decrease in size  of the large mesenteric mass. Additional lymphadenopathy within  the  mesentery, retroperitoneum, retrodiaphragmatic, internal mammary, along  the epicardial fat pad and supraclavicular region has also decreased in  size. Omental and peritoneal thickening has also decreased in the  interim. Only trace amount of fluid within the pelvis today.     Radiation dose reduction techniques were utilized, including automated  exposure control and exposure modulation based on body size.     This report was finalized on 12/24/2020 10:53 AM by Dr. Janette Phan M.D.           Assessment/plan:         In summary I have seen a very healthy 60-year-old individual who is not a smoker or a drinker who has hypertension and history of hyperlipidemia as well as previous history of hyperuricemia and gout who has been seen by primary Nurse Practitioner, Mr. James Epley, for routine assessment and was found to have a very large abdominal mass. A CT was performed, I have reviewed the CT scan in the PAC system [facility]. In deed there is a very large mass independent of the kidney, pancreas, spleen, stomach and intestine. This mass is probably representation of conglomerate of lymph nodes and probably represents a non-Hodgkin's lymphoma. The patient so far has no major symptoms besides early satiety. He tried to lose weight since last fall and he lost 25 pounds of weight until the pandemia in March and April. Since then his weight has stabilized. The rest of his clinical exam discloses no cervical, axillary or inguinal adenopathies. Again he has no obvious B symptoms.       I reviewed the patient back on 11/12/2020 with his wife in order to review the PET scan and the pathology report of the abdominal mass. 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 The Ki-67 tumor is 30%.     I reviewed with the patient and his wife the PET scan. This is very dramatic in regard to the SUV activity  uptake in the large abdominal mass and also documents retroperitoneal adenopathy, compromise of the peritoneum and also compromise of the internal mammary nodes. There is right supraclavicular adenopathy. The PET scan is more than striking.     I also reviewed the peripheral blood flow cytometry that is negative for monoclonal population of cells.     I reviewed the LDH that the patient had done during the original consultation and uric acid and those numbers were normal.     With all of the above in mind I do believe that the patient has a follicular lymphoma grade 3 bulky disease with extensive large mass in the abdomen and is a stage IV. I do believe that the patient will benefit greatly of chemotherapy regimen and I discussed the case with Reed Aguirre MD, and Ricardo Collins MD, in our office. Obviously the patient is at risk of tumor lysis syndrome and that is an issue that is concerning to me.    Since the previous visit the patient proceeded with the 1st cycle of chemotherapy with CHOP, Rituxan and Neulasta On-body support. He also proceeded with hydration with IV fluids for 2 days in the office and a 3rd day the following week. he had no evidence of tumor lysis syndrome. His uric acid, phosphorus and potassium remain stable. What is impressive today is the dramatic resolution of the asymmetry of his abdominal examination given the prominence of the large abdominal mass that was measured close to 25-30 cm across. Today we have a leftover mass that is 6 cm in size in the left upper quadrant that is still a remnant of disease process. The patient has tolerated the 1st cycle of chemotherapy extremely well. His white count, hemoglobin and platelets are back to normal and the patient has no cancer related pain. The activities of daily living are taking place with normality and he already knows the cycle of the situation that he will feel bad for a few days and then he will rebound back and feel better. Because we  have damage and destroyed most of the bulk of his tumor he will not require IV hydration today.    The patient was further reviewed on 12/29/2020. He has completed already 2 cycles of chemotherapy. We scheduled him to have a PET scan that was not approved by his insurance and instead of this they approved a CT scan. I have reviewed the CT scan with the patient today and his wife. My personal interpretation shows in my opinion some disagreement with the radiologist’s interpretation. The radiologist only measured the mass in only 1 area. I measured the mass in volume and it is dramatically different. He has had probably a 75% reduction in the total volume of tumor in the left upper quadrant of his abdomen. We compared the tumor at different levels and different locations and different views. This is more obvious to me. He has normal bowel activity otherwise. His gallbladder, liver and spleen remain normal. Pancreas and kidneys remain normal. 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 In the chest he had resolution of both his sites of disease if we compare this with the PET scan that was done at the time of the diagnosis.     I do not believe that the patient has encountered any significant side effects of the treatment with the exception of minimal anemia documented today with a hemoglobin of 11.6. His white count and his platelet count are normal. His chemistry profile is pending. The patient remains on allopurinol and he will stay on this medicine for the time being not only because he used to have gout and high uric acid before it is also the need for him to continue prophylaxis for tumor lysis even though doubt that this will be happening with the volume of tumor that is leftover.     I discussed with him the radiological findings in detail in the PACS system at [facility].     I do believe that the patient needs to pursue treatment as follows:     1. He will  proceed with his 3rd cycle of chemotherapy today with CHOP/Rituxan at the same dosing and he will be supported with Neulasta.   2. He will return for blood count and nurse visit every week.   3. He will return for doctor visit in 3 and 6 weeks to continue his chemotherapy treatment.   4. We will plan a PET scan after cycle 4 if insurance approval.   5. The patient hopefully will be cured from this condition with a regimen of medicines that we are utilizing on him at this time.   6. 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     He is not requiring any pain medicine.     He will remain on his allopurinol for the time being.     He will remain also on his metoprolol that is useful not only for his blood pressure control but also for cardiac protection.     I reminded the patient to be sure that he takes his 5 days of prednisone according to protocol.     I discussed all these facts with the patient and his wife in the room.          I DISCUSSED WITH PATIENT IN DETAIL FORMS TO DECREASE CHANCES OF CORONAVIRUS INFECTION INCLUDING ISOLATION, PROPER HAND HIGIENE, AVOID PUBLIC PLACES  WITH CROWDS, FOLLOW  CDC RECOMENDATIONS, AND KEEP PERSONAL AND SOCIAL RESPONSIBILITY, WARE A MASK IN PUBLIC PLACES.  PATIENT IS AWARE THIS INFECTION COULD HAVE SEVERE CONSEQUENCES TO PERSONAL HEALTH AND FAMILY RAMIFICATIONS OF THIS.

## 2020-12-30 NOTE — TELEPHONE ENCOUNTER
Called and confirmed that pt picked up refill of prednisone to start today.  Pt states he did  and started today.

## 2021-01-01 ENCOUNTER — INFUSION (OUTPATIENT)
Dept: ONCOLOGY | Facility: HOSPITAL | Age: 61
End: 2021-01-01

## 2021-01-01 ENCOUNTER — LAB (OUTPATIENT)
Dept: LAB | Facility: HOSPITAL | Age: 61
End: 2021-01-01

## 2021-01-01 ENCOUNTER — OFFICE VISIT (OUTPATIENT)
Dept: ONCOLOGY | Facility: CLINIC | Age: 61
End: 2021-01-01

## 2021-01-01 ENCOUNTER — TRANSCRIBE ORDERS (OUTPATIENT)
Dept: ADMINISTRATIVE | Facility: HOSPITAL | Age: 61
End: 2021-01-01

## 2021-01-01 ENCOUNTER — CLINICAL SUPPORT (OUTPATIENT)
Dept: ONCOLOGY | Facility: HOSPITAL | Age: 61
End: 2021-01-01

## 2021-01-01 ENCOUNTER — APPOINTMENT (OUTPATIENT)
Dept: ONCOLOGY | Facility: HOSPITAL | Age: 61
End: 2021-01-01

## 2021-01-01 ENCOUNTER — HOSPITAL ENCOUNTER (OUTPATIENT)
Dept: CARDIOLOGY | Facility: HOSPITAL | Age: 61
Discharge: HOME | End: 2021-03-30
Admitting: INTERNAL MEDICINE

## 2021-01-01 ENCOUNTER — TELEPHONE (OUTPATIENT)
Dept: ONCOLOGY | Facility: CLINIC | Age: 61
End: 2021-01-01

## 2021-01-01 ENCOUNTER — HOSPITAL ENCOUNTER (OUTPATIENT)
Dept: ULTRASOUND IMAGING | Facility: HOSPITAL | Age: 61
Discharge: HOME | End: 2021-11-05

## 2021-01-01 ENCOUNTER — HOSPITAL ENCOUNTER (OUTPATIENT)
Dept: ULTRASOUND IMAGING | Facility: HOSPITAL | Age: 61
Discharge: HOME | End: 2021-10-29

## 2021-01-01 ENCOUNTER — TRANSITIONAL CARE MANAGEMENT TELEPHONE ENCOUNTER (OUTPATIENT)
Dept: CALL CENTER | Facility: HOSPITAL | Age: 61
End: 2021-01-01

## 2021-01-01 ENCOUNTER — APPOINTMENT (OUTPATIENT)
Dept: ULTRASOUND IMAGING | Facility: HOSPITAL | Age: 61
End: 2021-01-01

## 2021-01-01 ENCOUNTER — BULK ORDERING (OUTPATIENT)
Dept: CASE MANAGEMENT | Facility: OTHER | Age: 61
End: 2021-01-01

## 2021-01-01 ENCOUNTER — APPOINTMENT (OUTPATIENT)
Dept: LAB | Facility: HOSPITAL | Age: 61
End: 2021-01-01

## 2021-01-01 ENCOUNTER — HOSPITAL ENCOUNTER (OUTPATIENT)
Dept: CT IMAGING | Facility: HOSPITAL | Age: 61
Discharge: HOME | End: 2021-05-26
Admitting: INTERNAL MEDICINE

## 2021-01-01 ENCOUNTER — TELEPHONE (OUTPATIENT)
Dept: ONCOLOGY | Facility: HOSPITAL | Age: 61
End: 2021-01-01

## 2021-01-01 ENCOUNTER — TELEPHONE (OUTPATIENT)
Dept: ONCOLOGY | Facility: OTHER | Age: 61
End: 2021-01-01

## 2021-01-01 ENCOUNTER — TELEPHONE (OUTPATIENT)
Dept: CARDIOLOGY | Facility: CLINIC | Age: 61
End: 2021-01-01

## 2021-01-01 ENCOUNTER — HOSPITAL ENCOUNTER (OUTPATIENT)
Dept: PET IMAGING | Facility: HOSPITAL | Age: 61
Discharge: HOME | End: 2021-04-26

## 2021-01-01 ENCOUNTER — TELEPHONE (OUTPATIENT)
Dept: PHARMACY | Facility: HOSPITAL | Age: 61
End: 2021-01-01

## 2021-01-01 ENCOUNTER — READMISSION MANAGEMENT (OUTPATIENT)
Dept: CALL CENTER | Facility: HOSPITAL | Age: 61
End: 2021-01-01

## 2021-01-01 ENCOUNTER — TELEMEDICINE (OUTPATIENT)
Dept: ONCOLOGY | Facility: CLINIC | Age: 61
End: 2021-01-01

## 2021-01-01 ENCOUNTER — HOSPITAL ENCOUNTER (OUTPATIENT)
Dept: GENERAL RADIOLOGY | Facility: HOSPITAL | Age: 61
Discharge: HOME | End: 2021-10-20
Admitting: NURSE PRACTITIONER

## 2021-01-01 ENCOUNTER — OFFICE VISIT (OUTPATIENT)
Dept: CARDIOLOGY | Facility: CLINIC | Age: 61
End: 2021-01-01

## 2021-01-01 ENCOUNTER — TELEPHONE (OUTPATIENT)
Dept: INTERVENTIONAL RADIOLOGY/VASCULAR | Facility: HOSPITAL | Age: 61
End: 2021-01-01

## 2021-01-01 ENCOUNTER — OFFICE VISIT (OUTPATIENT)
Dept: FAMILY MEDICINE CLINIC | Facility: CLINIC | Age: 61
End: 2021-01-01

## 2021-01-01 ENCOUNTER — HOSPITAL ENCOUNTER (OUTPATIENT)
Dept: PET IMAGING | Facility: HOSPITAL | Age: 61
Discharge: HOME | End: 2021-02-03

## 2021-01-01 ENCOUNTER — HOSPITAL ENCOUNTER (OUTPATIENT)
Dept: PET IMAGING | Facility: HOSPITAL | Age: 61
Setting detail: OBSERVATION
Discharge: HOME | End: 2021-10-26
Attending: INTERNAL MEDICINE | Admitting: INTERNAL MEDICINE

## 2021-01-01 VITALS
WEIGHT: 237.4 LBS | HEART RATE: 109 BPM | RESPIRATION RATE: 18 BRPM | BODY MASS INDEX: 28.91 KG/M2 | OXYGEN SATURATION: 97 % | TEMPERATURE: 96.4 F | HEIGHT: 76 IN | SYSTOLIC BLOOD PRESSURE: 120 MMHG | DIASTOLIC BLOOD PRESSURE: 81 MMHG

## 2021-01-01 VITALS
SYSTOLIC BLOOD PRESSURE: 122 MMHG | WEIGHT: 225 LBS | HEART RATE: 117 BPM | RESPIRATION RATE: 18 BRPM | OXYGEN SATURATION: 95 % | DIASTOLIC BLOOD PRESSURE: 87 MMHG | TEMPERATURE: 96.6 F | BODY MASS INDEX: 27.4 KG/M2 | HEIGHT: 76 IN

## 2021-01-01 VITALS
SYSTOLIC BLOOD PRESSURE: 122 MMHG | HEART RATE: 136 BPM | WEIGHT: 230 LBS | DIASTOLIC BLOOD PRESSURE: 93 MMHG | BODY MASS INDEX: 28.01 KG/M2 | TEMPERATURE: 97.8 F | HEIGHT: 76 IN | OXYGEN SATURATION: 95 % | RESPIRATION RATE: 20 BRPM

## 2021-01-01 VITALS
TEMPERATURE: 98.2 F | HEART RATE: 110 BPM | DIASTOLIC BLOOD PRESSURE: 64 MMHG | SYSTOLIC BLOOD PRESSURE: 86 MMHG | OXYGEN SATURATION: 99 %

## 2021-01-01 VITALS
HEIGHT: 76 IN | RESPIRATION RATE: 19 BRPM | TEMPERATURE: 97.3 F | TEMPERATURE: 97.1 F | OXYGEN SATURATION: 96 % | HEIGHT: 76 IN | RESPIRATION RATE: 16 BRPM | SYSTOLIC BLOOD PRESSURE: 142 MMHG | BODY MASS INDEX: 30.98 KG/M2 | BODY MASS INDEX: 31.79 KG/M2 | SYSTOLIC BLOOD PRESSURE: 114 MMHG | WEIGHT: 254.4 LBS | HEART RATE: 99 BPM | HEART RATE: 90 BPM | DIASTOLIC BLOOD PRESSURE: 69 MMHG | DIASTOLIC BLOOD PRESSURE: 82 MMHG | WEIGHT: 261.1 LBS | OXYGEN SATURATION: 96 %

## 2021-01-01 VITALS — DIASTOLIC BLOOD PRESSURE: 70 MMHG | HEART RATE: 73 BPM | SYSTOLIC BLOOD PRESSURE: 99 MMHG

## 2021-01-01 VITALS
BODY MASS INDEX: 28.48 KG/M2 | SYSTOLIC BLOOD PRESSURE: 121 MMHG | OXYGEN SATURATION: 95 % | DIASTOLIC BLOOD PRESSURE: 87 MMHG | WEIGHT: 233.9 LBS | TEMPERATURE: 98.2 F | RESPIRATION RATE: 18 BRPM | HEIGHT: 76 IN | HEART RATE: 137 BPM

## 2021-01-01 VITALS
RESPIRATION RATE: 16 BRPM | BODY MASS INDEX: 30.16 KG/M2 | DIASTOLIC BLOOD PRESSURE: 95 MMHG | HEART RATE: 119 BPM | WEIGHT: 247.7 LBS | SYSTOLIC BLOOD PRESSURE: 134 MMHG | OXYGEN SATURATION: 95 % | TEMPERATURE: 97.5 F | HEIGHT: 76 IN

## 2021-01-01 VITALS
WEIGHT: 240 LBS | RESPIRATION RATE: 20 BRPM | OXYGEN SATURATION: 96 % | HEIGHT: 76 IN | TEMPERATURE: 98.2 F | BODY MASS INDEX: 29.22 KG/M2 | DIASTOLIC BLOOD PRESSURE: 87 MMHG | HEART RATE: 93 BPM | SYSTOLIC BLOOD PRESSURE: 118 MMHG

## 2021-01-01 VITALS
TEMPERATURE: 98.4 F | HEART RATE: 94 BPM | OXYGEN SATURATION: 98 % | DIASTOLIC BLOOD PRESSURE: 71 MMHG | SYSTOLIC BLOOD PRESSURE: 107 MMHG

## 2021-01-01 VITALS
RESPIRATION RATE: 20 BRPM | BODY MASS INDEX: 31.51 KG/M2 | DIASTOLIC BLOOD PRESSURE: 81 MMHG | HEIGHT: 76 IN | SYSTOLIC BLOOD PRESSURE: 128 MMHG | HEART RATE: 87 BPM | OXYGEN SATURATION: 98 % | TEMPERATURE: 97.9 F | WEIGHT: 258.8 LBS

## 2021-01-01 VITALS
RESPIRATION RATE: 16 BRPM | OXYGEN SATURATION: 95 % | BODY MASS INDEX: 28.01 KG/M2 | SYSTOLIC BLOOD PRESSURE: 130 MMHG | HEIGHT: 76 IN | DIASTOLIC BLOOD PRESSURE: 91 MMHG | TEMPERATURE: 96.6 F | HEART RATE: 129 BPM

## 2021-01-01 VITALS
BODY MASS INDEX: 28.37 KG/M2 | RESPIRATION RATE: 18 BRPM | DIASTOLIC BLOOD PRESSURE: 85 MMHG | HEART RATE: 113 BPM | TEMPERATURE: 97.4 F | SYSTOLIC BLOOD PRESSURE: 128 MMHG | OXYGEN SATURATION: 95 % | WEIGHT: 233 LBS | HEIGHT: 76 IN

## 2021-01-01 VITALS
TEMPERATURE: 98 F | OXYGEN SATURATION: 96 % | WEIGHT: 269.6 LBS | SYSTOLIC BLOOD PRESSURE: 141 MMHG | BODY MASS INDEX: 32.83 KG/M2 | DIASTOLIC BLOOD PRESSURE: 89 MMHG | RESPIRATION RATE: 16 BRPM | HEART RATE: 74 BPM | HEIGHT: 76 IN

## 2021-01-01 VITALS — DIASTOLIC BLOOD PRESSURE: 78 MMHG | SYSTOLIC BLOOD PRESSURE: 102 MMHG | HEART RATE: 98 BPM

## 2021-01-01 VITALS
HEART RATE: 74 BPM | HEIGHT: 76 IN | WEIGHT: 260 LBS | BODY MASS INDEX: 31.66 KG/M2 | DIASTOLIC BLOOD PRESSURE: 68 MMHG | OXYGEN SATURATION: 95 % | SYSTOLIC BLOOD PRESSURE: 100 MMHG

## 2021-01-01 VITALS
DIASTOLIC BLOOD PRESSURE: 78 MMHG | HEIGHT: 76 IN | HEART RATE: 77 BPM | TEMPERATURE: 98 F | BODY MASS INDEX: 33 KG/M2 | OXYGEN SATURATION: 96 % | SYSTOLIC BLOOD PRESSURE: 111 MMHG | WEIGHT: 271 LBS

## 2021-01-01 VITALS
OXYGEN SATURATION: 98 % | HEART RATE: 67 BPM | HEIGHT: 76 IN | DIASTOLIC BLOOD PRESSURE: 73 MMHG | RESPIRATION RATE: 18 BRPM | BODY MASS INDEX: 32.27 KG/M2 | SYSTOLIC BLOOD PRESSURE: 120 MMHG | TEMPERATURE: 97.7 F | WEIGHT: 265 LBS

## 2021-01-01 VITALS
DIASTOLIC BLOOD PRESSURE: 74 MMHG | BODY MASS INDEX: 31.9 KG/M2 | HEIGHT: 76 IN | HEART RATE: 93 BPM | SYSTOLIC BLOOD PRESSURE: 100 MMHG | WEIGHT: 262 LBS

## 2021-01-01 VITALS
TEMPERATURE: 97.8 F | SYSTOLIC BLOOD PRESSURE: 100 MMHG | RESPIRATION RATE: 24 BRPM | OXYGEN SATURATION: 94 % | DIASTOLIC BLOOD PRESSURE: 74 MMHG | HEART RATE: 110 BPM

## 2021-01-01 VITALS
WEIGHT: 266.6 LBS | SYSTOLIC BLOOD PRESSURE: 131 MMHG | HEIGHT: 76 IN | TEMPERATURE: 97.5 F | BODY MASS INDEX: 32.47 KG/M2 | DIASTOLIC BLOOD PRESSURE: 87 MMHG | RESPIRATION RATE: 16 BRPM | HEART RATE: 68 BPM | OXYGEN SATURATION: 98 %

## 2021-01-01 VITALS
BODY MASS INDEX: 32.83 KG/M2 | WEIGHT: 269.6 LBS | TEMPERATURE: 98.1 F | HEART RATE: 84 BPM | OXYGEN SATURATION: 97 % | DIASTOLIC BLOOD PRESSURE: 85 MMHG | HEIGHT: 76 IN | RESPIRATION RATE: 20 BRPM | SYSTOLIC BLOOD PRESSURE: 138 MMHG

## 2021-01-01 VITALS
SYSTOLIC BLOOD PRESSURE: 108 MMHG | OXYGEN SATURATION: 95 % | DIASTOLIC BLOOD PRESSURE: 71 MMHG | TEMPERATURE: 98.2 F | RESPIRATION RATE: 18 BRPM | HEART RATE: 108 BPM

## 2021-01-01 VITALS — SYSTOLIC BLOOD PRESSURE: 130 MMHG | HEART RATE: 124 BPM | OXYGEN SATURATION: 96 % | DIASTOLIC BLOOD PRESSURE: 86 MMHG

## 2021-01-01 VITALS — SYSTOLIC BLOOD PRESSURE: 97 MMHG | HEART RATE: 70 BPM | DIASTOLIC BLOOD PRESSURE: 66 MMHG

## 2021-01-01 VITALS
BODY MASS INDEX: 29.43 KG/M2 | TEMPERATURE: 96.8 F | HEIGHT: 76 IN | RESPIRATION RATE: 18 BRPM | HEART RATE: 96 BPM | SYSTOLIC BLOOD PRESSURE: 116 MMHG | WEIGHT: 241.7 LBS | OXYGEN SATURATION: 97 % | DIASTOLIC BLOOD PRESSURE: 72 MMHG

## 2021-01-01 VITALS
RESPIRATION RATE: 18 BRPM | TEMPERATURE: 97.7 F | SYSTOLIC BLOOD PRESSURE: 131 MMHG | OXYGEN SATURATION: 95 % | HEART RATE: 98 BPM | DIASTOLIC BLOOD PRESSURE: 85 MMHG

## 2021-01-01 VITALS — DIASTOLIC BLOOD PRESSURE: 65 MMHG | SYSTOLIC BLOOD PRESSURE: 100 MMHG | HEART RATE: 68 BPM

## 2021-01-01 DIAGNOSIS — Z91.89 AT HIGH RISK OF TUMOR LYSIS SYNDROME: ICD-10-CM

## 2021-01-01 DIAGNOSIS — Z79.899 HIGH RISK MEDICATION USE: ICD-10-CM

## 2021-01-01 DIAGNOSIS — E78.2 MIXED HYPERLIPIDEMIA: Primary | ICD-10-CM

## 2021-01-01 DIAGNOSIS — R19.02 LEFT UPPER QUADRANT ABDOMINAL MASS: ICD-10-CM

## 2021-01-01 DIAGNOSIS — C82.28 GRADE 3 FOLLICULAR LYMPHOMA OF LYMPH NODES OF MULTIPLE REGIONS (HCC): Primary | ICD-10-CM

## 2021-01-01 DIAGNOSIS — R06.02 SHORTNESS OF BREATH: ICD-10-CM

## 2021-01-01 DIAGNOSIS — C82.28 GRADE 3 FOLLICULAR LYMPHOMA OF LYMPH NODES OF MULTIPLE REGIONS (HCC): ICD-10-CM

## 2021-01-01 DIAGNOSIS — R79.9 ABNORMAL BLOOD CHEMISTRY LEVEL: ICD-10-CM

## 2021-01-01 DIAGNOSIS — E83.52 HYPERCALCEMIA: ICD-10-CM

## 2021-01-01 DIAGNOSIS — Z20.822 ENCOUNTER FOR PREPROCEDURE SCREENING LABORATORY TESTING FOR COVID-19: ICD-10-CM

## 2021-01-01 DIAGNOSIS — R79.89 PRERENAL AZOTEMIA: ICD-10-CM

## 2021-01-01 DIAGNOSIS — I10 ESSENTIAL HYPERTENSION: ICD-10-CM

## 2021-01-01 DIAGNOSIS — F41.9 ANXIETY: Primary | ICD-10-CM

## 2021-01-01 DIAGNOSIS — R00.0 SINUS TACHYCARDIA: ICD-10-CM

## 2021-01-01 DIAGNOSIS — I95.2 HYPOTENSION DUE TO DRUGS: ICD-10-CM

## 2021-01-01 DIAGNOSIS — E86.0 DEHYDRATION, SEVERE: ICD-10-CM

## 2021-01-01 DIAGNOSIS — Z00.00 HEALTH MAINTENANCE EXAMINATION: ICD-10-CM

## 2021-01-01 DIAGNOSIS — Z20.822 ENCOUNTER FOR PREPROCEDURE SCREENING LABORATORY TESTING FOR COVID-19: Primary | ICD-10-CM

## 2021-01-01 DIAGNOSIS — J91.0 MALIGNANT PLEURAL EFFUSION: ICD-10-CM

## 2021-01-01 DIAGNOSIS — R11.2 NAUSEA AND VOMITING, INTRACTABILITY OF VOMITING NOT SPECIFIED, UNSPECIFIED VOMITING TYPE: ICD-10-CM

## 2021-01-01 DIAGNOSIS — D64.9 ANEMIA, UNSPECIFIED TYPE: ICD-10-CM

## 2021-01-01 DIAGNOSIS — R18.0 ASCITES, MALIGNANT: ICD-10-CM

## 2021-01-01 DIAGNOSIS — R11.2 NAUSEA AND VOMITING, INTRACTABILITY OF VOMITING NOT SPECIFIED, UNSPECIFIED VOMITING TYPE: Primary | ICD-10-CM

## 2021-01-01 DIAGNOSIS — N18.1 STAGE 1 CHRONIC KIDNEY DISEASE: ICD-10-CM

## 2021-01-01 DIAGNOSIS — Z01.818 OTHER SPECIFIED PRE-OPERATIVE EXAMINATION: Primary | ICD-10-CM

## 2021-01-01 DIAGNOSIS — D64.9 ANEMIA, UNSPECIFIED TYPE: Primary | ICD-10-CM

## 2021-01-01 DIAGNOSIS — R19.02 LEFT UPPER QUADRANT ABDOMINAL MASS: Primary | ICD-10-CM

## 2021-01-01 DIAGNOSIS — J90 PLEURAL EFFUSION ON RIGHT: ICD-10-CM

## 2021-01-01 DIAGNOSIS — Z01.812 ENCOUNTER FOR PREPROCEDURE SCREENING LABORATORY TESTING FOR COVID-19: Primary | ICD-10-CM

## 2021-01-01 DIAGNOSIS — R14.0 ABDOMINAL DISTENTION: ICD-10-CM

## 2021-01-01 DIAGNOSIS — Z23 IMMUNIZATION DUE: ICD-10-CM

## 2021-01-01 DIAGNOSIS — E83.52 HYPERCALCEMIA: Primary | ICD-10-CM

## 2021-01-01 DIAGNOSIS — Z79.899 HIGH RISK MEDICATION USE: Primary | ICD-10-CM

## 2021-01-01 DIAGNOSIS — J91.0 MALIGNANT PLEURAL EFFUSION: Primary | ICD-10-CM

## 2021-01-01 DIAGNOSIS — I10 ESSENTIAL HYPERTENSION: Primary | ICD-10-CM

## 2021-01-01 DIAGNOSIS — R00.0 TACHYCARDIA: Primary | ICD-10-CM

## 2021-01-01 DIAGNOSIS — Z01.812 ENCOUNTER FOR PREPROCEDURE SCREENING LABORATORY TESTING FOR COVID-19: ICD-10-CM

## 2021-01-01 DIAGNOSIS — E78.2 MIXED HYPERLIPIDEMIA: ICD-10-CM

## 2021-01-01 DIAGNOSIS — Z51.5: ICD-10-CM

## 2021-01-01 LAB
ABO GROUP BLD: NORMAL
ABO GROUP BLD: NORMAL
ALBUMIN FLD-MCNC: 2.8 G/DL
ALBUMIN SERPL-MCNC: 3.5 G/DL (ref 3.5–5.2)
ALBUMIN SERPL-MCNC: 3.6 G/DL (ref 3.5–5.2)
ALBUMIN SERPL-MCNC: 3.7 G/DL (ref 3.5–5.2)
ALBUMIN SERPL-MCNC: 3.8 G/DL (ref 3.5–5.2)
ALBUMIN SERPL-MCNC: 3.9 G/DL (ref 3.5–5.2)
ALBUMIN SERPL-MCNC: 4.3 G/DL (ref 3.5–5.2)
ALBUMIN SERPL-MCNC: 4.3 G/DL (ref 3.5–5.2)
ALBUMIN SERPL-MCNC: 4.4 G/DL (ref 3.5–5.2)
ALBUMIN SERPL-MCNC: 4.4 G/DL (ref 3.5–5.2)
ALBUMIN SERPL-MCNC: 4.5 G/DL (ref 3.5–5.2)
ALBUMIN SERPL-MCNC: 4.6 G/DL (ref 3.5–5.2)
ALBUMIN SERPL-MCNC: 4.6 G/DL (ref 3.5–5.2)
ALBUMIN SERPL-MCNC: 4.7 G/DL (ref 3.5–5.2)
ALBUMIN SERPL-MCNC: 4.7 G/DL (ref 3.5–5.2)
ALBUMIN/GLOB SERPL: 1.1 G/DL
ALBUMIN/GLOB SERPL: 1.2 G/DL
ALBUMIN/GLOB SERPL: 1.2 G/DL
ALBUMIN/GLOB SERPL: 1.2 G/DL (ref 1.1–2.4)
ALBUMIN/GLOB SERPL: 1.3 G/DL
ALBUMIN/GLOB SERPL: 1.3 G/DL (ref 1.1–2.4)
ALBUMIN/GLOB SERPL: 1.3 G/DL (ref 1.1–2.4)
ALBUMIN/GLOB SERPL: 1.4 G/DL (ref 1.1–2.4)
ALBUMIN/GLOB SERPL: 1.5 G/DL (ref 1.1–2.4)
ALBUMIN/GLOB SERPL: 1.5 G/DL (ref 1.1–2.4)
ALBUMIN/GLOB SERPL: 1.6 G/DL
ALBUMIN/GLOB SERPL: 1.6 G/DL (ref 1.1–2.4)
ALBUMIN/GLOB SERPL: 1.7 G/DL (ref 1.1–2.4)
ALBUMIN/GLOB SERPL: 1.8 G/DL (ref 1.1–2.4)
ALBUMIN/GLOB SERPL: 1.8 G/DL (ref 1.1–2.4)
ALBUMIN/GLOB SERPL: 2 G/DL (ref 1.1–2.4)
ALP SERPL-CCNC: 52 U/L (ref 38–116)
ALP SERPL-CCNC: 52 U/L (ref 39–117)
ALP SERPL-CCNC: 53 U/L (ref 38–116)
ALP SERPL-CCNC: 57 U/L (ref 38–116)
ALP SERPL-CCNC: 57 U/L (ref 39–117)
ALP SERPL-CCNC: 59 U/L (ref 38–116)
ALP SERPL-CCNC: 61 U/L (ref 38–116)
ALP SERPL-CCNC: 62 U/L (ref 38–116)
ALP SERPL-CCNC: 64 U/L (ref 38–116)
ALP SERPL-CCNC: 65 U/L (ref 38–116)
ALP SERPL-CCNC: 67 U/L (ref 38–116)
ALP SERPL-CCNC: 73 U/L (ref 38–116)
ALP SERPL-CCNC: 75 U/L (ref 39–117)
ALP SERPL-CCNC: 76 U/L (ref 38–116)
ALP SERPL-CCNC: 80 U/L (ref 38–116)
ALP SERPL-CCNC: 87 U/L (ref 39–117)
ALP SERPL-CCNC: 88 U/L (ref 39–117)
ALT SERPL W P-5'-P-CCNC: 16 U/L (ref 0–41)
ALT SERPL W P-5'-P-CCNC: 17 U/L (ref 0–41)
ALT SERPL W P-5'-P-CCNC: 17 U/L (ref 0–41)
ALT SERPL W P-5'-P-CCNC: 17 U/L (ref 1–41)
ALT SERPL W P-5'-P-CCNC: 18 U/L (ref 0–41)
ALT SERPL W P-5'-P-CCNC: 18 U/L (ref 1–41)
ALT SERPL W P-5'-P-CCNC: 20 U/L (ref 0–41)
ALT SERPL W P-5'-P-CCNC: 21 U/L (ref 0–41)
ALT SERPL W P-5'-P-CCNC: 21 U/L (ref 0–41)
ALT SERPL W P-5'-P-CCNC: 21 U/L (ref 1–41)
ALT SERPL W P-5'-P-CCNC: 22 U/L (ref 0–41)
ALT SERPL W P-5'-P-CCNC: 22 U/L (ref 1–41)
ALT SERPL W P-5'-P-CCNC: 22 U/L (ref 1–41)
ALT SERPL W P-5'-P-CCNC: 23 U/L (ref 0–41)
ALT SERPL W P-5'-P-CCNC: 23 U/L (ref 0–41)
ALT SERPL W P-5'-P-CCNC: 25 U/L (ref 0–41)
ALT SERPL W P-5'-P-CCNC: 28 U/L (ref 0–41)
ALT SERPL W P-5'-P-CCNC: 32 U/L (ref 0–41)
ALT SERPL W P-5'-P-CCNC: 32 U/L (ref 0–41)
ALT SERPL W P-5'-P-CCNC: 34 U/L (ref 0–41)
ALT SERPL W P-5'-P-CCNC: 72 U/L (ref 0–41)
ANION GAP SERPL CALCULATED.3IONS-SCNC: 10.8 MMOL/L (ref 5–15)
ANION GAP SERPL CALCULATED.3IONS-SCNC: 10.8 MMOL/L (ref 5–15)
ANION GAP SERPL CALCULATED.3IONS-SCNC: 11 MMOL/L (ref 5–15)
ANION GAP SERPL CALCULATED.3IONS-SCNC: 11.3 MMOL/L (ref 5–15)
ANION GAP SERPL CALCULATED.3IONS-SCNC: 11.3 MMOL/L (ref 5–15)
ANION GAP SERPL CALCULATED.3IONS-SCNC: 11.5 MMOL/L (ref 5–15)
ANION GAP SERPL CALCULATED.3IONS-SCNC: 12.1 MMOL/L (ref 5–15)
ANION GAP SERPL CALCULATED.3IONS-SCNC: 12.1 MMOL/L (ref 5–15)
ANION GAP SERPL CALCULATED.3IONS-SCNC: 12.2 MMOL/L (ref 5–15)
ANION GAP SERPL CALCULATED.3IONS-SCNC: 12.7 MMOL/L (ref 5–15)
ANION GAP SERPL CALCULATED.3IONS-SCNC: 13.8 MMOL/L (ref 5–15)
ANION GAP SERPL CALCULATED.3IONS-SCNC: 14.8 MMOL/L (ref 5–15)
ANION GAP SERPL CALCULATED.3IONS-SCNC: 14.8 MMOL/L (ref 5–15)
ANION GAP SERPL CALCULATED.3IONS-SCNC: 16.8 MMOL/L (ref 5–15)
ANION GAP SERPL CALCULATED.3IONS-SCNC: 16.8 MMOL/L (ref 5–15)
ANION GAP SERPL CALCULATED.3IONS-SCNC: 17.1 MMOL/L (ref 5–15)
ANION GAP SERPL CALCULATED.3IONS-SCNC: 18 MMOL/L (ref 5–15)
ANION GAP SERPL CALCULATED.3IONS-SCNC: 21.9 MMOL/L (ref 5–15)
ANION GAP SERPL CALCULATED.3IONS-SCNC: 22.6 MMOL/L (ref 5–15)
ANION GAP SERPL CALCULATED.3IONS-SCNC: 25.6 MMOL/L (ref 5–15)
ANION GAP SERPL CALCULATED.3IONS-SCNC: 9 MMOL/L (ref 5–15)
APPEARANCE FLD: ABNORMAL
APPEARANCE FLD: ABNORMAL
AST SERPL-CCNC: 15 U/L (ref 0–40)
AST SERPL-CCNC: 17 U/L (ref 0–40)
AST SERPL-CCNC: 21 U/L (ref 0–40)
AST SERPL-CCNC: 21 U/L (ref 0–40)
AST SERPL-CCNC: 22 U/L (ref 0–40)
AST SERPL-CCNC: 22 U/L (ref 0–40)
AST SERPL-CCNC: 24 U/L (ref 0–40)
AST SERPL-CCNC: 25 U/L (ref 1–40)
AST SERPL-CCNC: 28 U/L (ref 0–40)
AST SERPL-CCNC: 29 U/L (ref 1–40)
AST SERPL-CCNC: 30 U/L (ref 0–40)
AST SERPL-CCNC: 30 U/L (ref 0–40)
AST SERPL-CCNC: 30 U/L (ref 1–40)
AST SERPL-CCNC: 32 U/L (ref 0–40)
AST SERPL-CCNC: 37 U/L (ref 0–40)
AST SERPL-CCNC: 37 U/L (ref 1–40)
AST SERPL-CCNC: 38 U/L (ref 0–40)
AST SERPL-CCNC: 38 U/L (ref 0–40)
AST SERPL-CCNC: 43 U/L (ref 1–40)
BACTERIA FLD CULT: NORMAL
BACTERIA FLD CULT: NORMAL
BACTERIA SPEC AEROBE CULT: NORMAL
BACTERIA SPEC AEROBE CULT: NORMAL
BASOPHILS # BLD AUTO: 0.01 10*3/MM3 (ref 0–0.2)
BASOPHILS # BLD AUTO: 0.02 10*3/MM3 (ref 0–0.2)
BASOPHILS # BLD AUTO: 0.03 10*3/MM3 (ref 0–0.2)
BASOPHILS # BLD AUTO: 0.04 10*3/MM3 (ref 0–0.2)
BASOPHILS # BLD AUTO: 0.05 10*3/MM3 (ref 0–0.2)
BASOPHILS # BLD AUTO: 0.07 10*3/MM3 (ref 0–0.2)
BASOPHILS # BLD AUTO: 0.12 10*3/MM3 (ref 0–0.2)
BASOPHILS # BLD AUTO: 0.12 10*3/MM3 (ref 0–0.2)
BASOPHILS NFR BLD AUTO: 0.1 % (ref 0–1.5)
BASOPHILS NFR BLD AUTO: 0.2 % (ref 0–1.5)
BASOPHILS NFR BLD AUTO: 0.3 % (ref 0–1.5)
BASOPHILS NFR BLD AUTO: 0.4 % (ref 0–1.5)
BASOPHILS NFR BLD AUTO: 0.4 % (ref 0–1.5)
BASOPHILS NFR BLD AUTO: 0.5 % (ref 0–1.5)
BASOPHILS NFR BLD AUTO: 0.6 % (ref 0–1.5)
BASOPHILS NFR BLD AUTO: 0.7 % (ref 0–1.5)
BASOPHILS NFR BLD AUTO: 0.8 % (ref 0–1.5)
BASOPHILS NFR BLD AUTO: 0.8 % (ref 0–1.5)
BASOPHILS NFR BLD AUTO: 1 % (ref 0–1.5)
BASOPHILS NFR BLD AUTO: 1.1 % (ref 0–1.5)
BASOPHILS NFR BLD AUTO: 1.1 % (ref 0–1.5)
BASOPHILS NFR BLD AUTO: 1.7 % (ref 0–1.5)
BASOPHILS NFR BLD AUTO: 2 % (ref 0–1.5)
BASOPHILS NFR BLD AUTO: 3.4 % (ref 0–1.5)
BASOPHILS NFR BLD AUTO: 3.5 % (ref 0–1.5)
BASOPHILS NFR BLD AUTO: 3.7 % (ref 0–1.5)
BASOPHILS NFR BLD AUTO: 4.5 % (ref 0–1.5)
BASOPHILS NFR BLD AUTO: 5.3 % (ref 0–1.5)
BH BB BLOOD EXPIRATION DATE: NORMAL
BH BB BLOOD EXPIRATION DATE: NORMAL
BH BB BLOOD TYPE BARCODE: 8400
BH BB BLOOD TYPE BARCODE: 8400
BH BB DISPENSE STATUS: NORMAL
BH BB DISPENSE STATUS: NORMAL
BH BB PRODUCT CODE: NORMAL
BH BB PRODUCT CODE: NORMAL
BH BB UNIT NUMBER: NORMAL
BH BB UNIT NUMBER: NORMAL
BH CV ECHO MEAS - BSA(HAYCOCK): 2.5 M^2
BH CV ECHO MEAS - BSA: 2.5 M^2
BH CV ECHO MEAS - BZI_BMI: 31.6 KILOGRAMS/M^2
BH CV ECHO MEAS - BZI_METRIC_HEIGHT: 193 CM
BH CV ECHO MEAS - BZI_METRIC_WEIGHT: 117.9 KG
BH CV ECHO MEAS - EDV(MOD-SP2): 113 ML
BH CV ECHO MEAS - EDV(MOD-SP4): 132 ML
BH CV ECHO MEAS - EF(MOD-BP): 56.3 %
BH CV ECHO MEAS - EF(MOD-SP2): 55.8 %
BH CV ECHO MEAS - EF(MOD-SP4): 56.8 %
BH CV ECHO MEAS - ESV(MOD-SP2): 50 ML
BH CV ECHO MEAS - ESV(MOD-SP4): 57 ML
BH CV ECHO MEAS - LAT PEAK E' VEL: 6.9 CM/SEC
BH CV ECHO MEAS - LV DIASTOLIC VOL/BSA (35-75): 53.3 ML/M^2
BH CV ECHO MEAS - LV SYSTOLIC VOL/BSA (12-30): 23 ML/M^2
BH CV ECHO MEAS - LVLD AP2: 7.4 CM
BH CV ECHO MEAS - LVLD AP4: 7.7 CM
BH CV ECHO MEAS - LVLS AP2: 6.8 CM
BH CV ECHO MEAS - LVLS AP4: 7 CM
BH CV ECHO MEAS - LVOT AREA (M): 3.8 CM^2
BH CV ECHO MEAS - LVOT AREA: 4 CM^2
BH CV ECHO MEAS - LVOT DIAM: 2.2 CM
BH CV ECHO MEAS - MED PEAK E' VEL: 7.1 CM/SEC
BH CV ECHO MEAS - MV A DUR: 0.14 SEC
BH CV ECHO MEAS - MV A MAX VEL: 70.3 CM/SEC
BH CV ECHO MEAS - MV DEC TIME: 0.24 SEC
BH CV ECHO MEAS - MV E MAX VEL: 66.4 CM/SEC
BH CV ECHO MEAS - MV E/A: 0.94
BH CV ECHO MEAS - RAP SYSTOLE: 3 MMHG
BH CV ECHO MEAS - RVSP: 24 MMHG
BH CV ECHO MEAS - SI(MOD-SP2): 25.4 ML/M^2
BH CV ECHO MEAS - SI(MOD-SP4): 30.3 ML/M^2
BH CV ECHO MEAS - SV(MOD-SP2): 63 ML
BH CV ECHO MEAS - SV(MOD-SP4): 75 ML
BH CV ECHO MEAS - TR MAX VEL: 226.7 CM/SEC
BH CV ECHO MEASUREMENTS AVERAGE E/E' RATIO: 9.49
BILIRUB SERPL-MCNC: 0.2 MG/DL (ref 0.2–1.2)
BILIRUB SERPL-MCNC: 0.2 MG/DL (ref 0–1.2)
BILIRUB SERPL-MCNC: 0.2 MG/DL (ref 0–1.2)
BILIRUB SERPL-MCNC: 0.3 MG/DL (ref 0.2–1.2)
BILIRUB SERPL-MCNC: 0.3 MG/DL (ref 0–1.2)
BILIRUB SERPL-MCNC: 0.4 MG/DL (ref 0.2–1.2)
BILIRUB SERPL-MCNC: 0.5 MG/DL (ref 0.2–1.2)
BILIRUB SERPL-MCNC: 0.7 MG/DL (ref 0.2–1.2)
BLD GP AB SCN SERPL QL: NEGATIVE
BUN SERPL-MCNC: 10 MG/DL (ref 6–20)
BUN SERPL-MCNC: 13 MG/DL (ref 6–20)
BUN SERPL-MCNC: 13 MG/DL (ref 6–20)
BUN SERPL-MCNC: 16 MG/DL (ref 6–20)
BUN SERPL-MCNC: 16 MG/DL (ref 6–20)
BUN SERPL-MCNC: 17 MG/DL (ref 6–20)
BUN SERPL-MCNC: 17 MG/DL (ref 8–23)
BUN SERPL-MCNC: 18 MG/DL (ref 6–20)
BUN SERPL-MCNC: 18 MG/DL (ref 6–20)
BUN SERPL-MCNC: 20 MG/DL (ref 6–20)
BUN SERPL-MCNC: 21 MG/DL (ref 8–23)
BUN SERPL-MCNC: 22 MG/DL (ref 8–23)
BUN SERPL-MCNC: 26 MG/DL (ref 6–20)
BUN SERPL-MCNC: 27 MG/DL (ref 6–20)
BUN SERPL-MCNC: 27 MG/DL (ref 8–23)
BUN SERPL-MCNC: 32 MG/DL (ref 6–20)
BUN SERPL-MCNC: 52 MG/DL (ref 8–23)
BUN/CREAT SERPL: 11.4 (ref 7.3–30)
BUN/CREAT SERPL: 13 (ref 7.3–30)
BUN/CREAT SERPL: 13.1 (ref 7.3–30)
BUN/CREAT SERPL: 14.4 (ref 7.3–30)
BUN/CREAT SERPL: 14.9 (ref 7.3–30)
BUN/CREAT SERPL: 15 (ref 7.3–30)
BUN/CREAT SERPL: 15.3 (ref 7.3–30)
BUN/CREAT SERPL: 15.7 (ref 7.3–30)
BUN/CREAT SERPL: 16.3 (ref 7.3–30)
BUN/CREAT SERPL: 17.4 (ref 7–25)
BUN/CREAT SERPL: 17.6 (ref 7.3–30)
BUN/CREAT SERPL: 17.9 (ref 7.3–30)
BUN/CREAT SERPL: 17.9 (ref 7–25)
BUN/CREAT SERPL: 18.2 (ref 7.3–30)
BUN/CREAT SERPL: 19.4 (ref 7–25)
BUN/CREAT SERPL: 20.3 (ref 7.3–30)
BUN/CREAT SERPL: 21 (ref 7.3–30)
BUN/CREAT SERPL: 21.8 (ref 7–25)
BUN/CREAT SERPL: 23.7 (ref 7.3–30)
BUN/CREAT SERPL: 24.6 (ref 7.3–30)
BUN/CREAT SERPL: 31.7 (ref 7–25)
CALCIUM SPEC-SCNC: 10 MG/DL (ref 8.5–10.2)
CALCIUM SPEC-SCNC: 10 MG/DL (ref 8.5–10.2)
CALCIUM SPEC-SCNC: 10.1 MG/DL (ref 8.5–10.2)
CALCIUM SPEC-SCNC: 10.2 MG/DL (ref 8.5–10.2)
CALCIUM SPEC-SCNC: 10.6 MG/DL (ref 8.6–10.5)
CALCIUM SPEC-SCNC: 11 MG/DL (ref 8.6–10.5)
CALCIUM SPEC-SCNC: 11.1 MG/DL (ref 8.6–10.5)
CALCIUM SPEC-SCNC: 8.5 MG/DL (ref 8.6–10.5)
CALCIUM SPEC-SCNC: 9 MG/DL (ref 8.5–10.2)
CALCIUM SPEC-SCNC: 9.1 MG/DL (ref 8.6–10.5)
CALCIUM SPEC-SCNC: 9.3 MG/DL (ref 8.5–10.2)
CALCIUM SPEC-SCNC: 9.4 MG/DL (ref 8.5–10.2)
CALCIUM SPEC-SCNC: 9.5 MG/DL (ref 8.5–10.2)
CALCIUM SPEC-SCNC: 9.6 MG/DL (ref 8.5–10.2)
CALCIUM SPEC-SCNC: 9.7 MG/DL (ref 8.5–10.2)
CALCIUM SPEC-SCNC: 9.7 MG/DL (ref 8.5–10.2)
CALCIUM SPEC-SCNC: 9.8 MG/DL (ref 8.5–10.2)
CALCIUM SPEC-SCNC: 9.8 MG/DL (ref 8.5–10.2)
CALCIUM SPEC-SCNC: 9.9 MG/DL (ref 8.5–10.2)
CHLORIDE SERPL-SCNC: 100 MMOL/L (ref 98–107)
CHLORIDE SERPL-SCNC: 101 MMOL/L (ref 98–107)
CHLORIDE SERPL-SCNC: 102 MMOL/L (ref 98–107)
CHLORIDE SERPL-SCNC: 102 MMOL/L (ref 98–107)
CHLORIDE SERPL-SCNC: 103 MMOL/L (ref 98–107)
CHLORIDE SERPL-SCNC: 103 MMOL/L (ref 98–107)
CHLORIDE SERPL-SCNC: 88 MMOL/L (ref 98–107)
CHLORIDE SERPL-SCNC: 89 MMOL/L (ref 98–107)
CHLORIDE SERPL-SCNC: 89 MMOL/L (ref 98–107)
CHLORIDE SERPL-SCNC: 90 MMOL/L (ref 98–107)
CHLORIDE SERPL-SCNC: 90 MMOL/L (ref 98–107)
CHLORIDE SERPL-SCNC: 91 MMOL/L (ref 98–107)
CHLORIDE SERPL-SCNC: 92 MMOL/L (ref 98–107)
CHLORIDE SERPL-SCNC: 93 MMOL/L (ref 98–107)
CHLORIDE SERPL-SCNC: 93 MMOL/L (ref 98–107)
CHLORIDE SERPL-SCNC: 96 MMOL/L (ref 98–107)
CHLORIDE SERPL-SCNC: 97 MMOL/L (ref 98–107)
CHLORIDE SERPL-SCNC: 98 MMOL/L (ref 98–107)
CHLORIDE SERPL-SCNC: 99 MMOL/L (ref 98–107)
CO2 SERPL-SCNC: 21.4 MMOL/L (ref 22–29)
CO2 SERPL-SCNC: 23.1 MMOL/L (ref 22–29)
CO2 SERPL-SCNC: 24.4 MMOL/L (ref 22–29)
CO2 SERPL-SCNC: 25.9 MMOL/L (ref 22–29)
CO2 SERPL-SCNC: 25.9 MMOL/L (ref 22–29)
CO2 SERPL-SCNC: 26 MMOL/L (ref 22–29)
CO2 SERPL-SCNC: 26.2 MMOL/L (ref 22–29)
CO2 SERPL-SCNC: 26.2 MMOL/L (ref 22–29)
CO2 SERPL-SCNC: 27.2 MMOL/L (ref 22–29)
CO2 SERPL-SCNC: 27.2 MMOL/L (ref 22–29)
CO2 SERPL-SCNC: 27.3 MMOL/L (ref 22–29)
CO2 SERPL-SCNC: 27.7 MMOL/L (ref 22–29)
CO2 SERPL-SCNC: 27.9 MMOL/L (ref 22–29)
CO2 SERPL-SCNC: 28 MMOL/L (ref 22–29)
CO2 SERPL-SCNC: 28.2 MMOL/L (ref 22–29)
CO2 SERPL-SCNC: 28.7 MMOL/L (ref 22–29)
CO2 SERPL-SCNC: 29.5 MMOL/L (ref 22–29)
CO2 SERPL-SCNC: 29.8 MMOL/L (ref 22–29)
CO2 SERPL-SCNC: 30.2 MMOL/L (ref 22–29)
CO2 SERPL-SCNC: 30.2 MMOL/L (ref 22–29)
CO2 SERPL-SCNC: 33 MMOL/L (ref 22–29)
COLOR FLD: ABNORMAL
COLOR FLD: ABNORMAL
CORTIS SERPL-MCNC: 28.54 MCG/DL
CREAT SERPL-MCNC: 0.88 MG/DL (ref 0.7–1.3)
CREAT SERPL-MCNC: 0.91 MG/DL (ref 0.7–1.3)
CREAT SERPL-MCNC: 0.95 MG/DL (ref 0.76–1.27)
CREAT SERPL-MCNC: 0.99 MG/DL (ref 0.7–1.3)
CREAT SERPL-MCNC: 0.99 MG/DL (ref 0.7–1.3)
CREAT SERPL-MCNC: 1 MG/DL (ref 0.7–1.3)
CREAT SERPL-MCNC: 1.01 MG/DL (ref 0.76–1.27)
CREAT SERPL-MCNC: 1.07 MG/DL (ref 0.7–1.3)
CREAT SERPL-MCNC: 1.08 MG/DL (ref 0.76–1.27)
CREAT SERPL-MCNC: 1.08 MG/DL (ref 0.7–1.3)
CREAT SERPL-MCNC: 1.11 MG/DL (ref 0.7–1.3)
CREAT SERPL-MCNC: 1.12 MG/DL (ref 0.7–1.3)
CREAT SERPL-MCNC: 1.14 MG/DL (ref 0.7–1.3)
CREAT SERPL-MCNC: 1.18 MG/DL (ref 0.7–1.3)
CREAT SERPL-MCNC: 1.21 MG/DL (ref 0.7–1.3)
CREAT SERPL-MCNC: 1.24 MG/DL (ref 0.7–1.3)
CREAT SERPL-MCNC: 1.28 MG/DL (ref 0.7–1.3)
CREAT SERPL-MCNC: 1.3 MG/DL (ref 0.7–1.3)
CREAT SERPL-MCNC: 1.55 MG/DL (ref 0.76–1.27)
CREAT SERPL-MCNC: 1.6 MG/DL (ref 0.7–1.3)
CREAT SERPL-MCNC: 1.64 MG/DL (ref 0.76–1.27)
CROSSMATCH INTERPRETATION: NORMAL
CROSSMATCH INTERPRETATION: NORMAL
CYTO UR: NORMAL
D-LACTATE SERPL-SCNC: 6.9 MMOL/L (ref 0.5–2)
D-LACTATE SERPL-SCNC: 7.2 MMOL/L (ref 0.5–2)
D-LACTATE SERPL-SCNC: 8.5 MMOL/L (ref 0.5–2)
DEPRECATED RDW RBC AUTO: 39.2 FL (ref 37–54)
DEPRECATED RDW RBC AUTO: 39.5 FL (ref 37–54)
DEPRECATED RDW RBC AUTO: 39.6 FL (ref 37–54)
DEPRECATED RDW RBC AUTO: 39.8 FL (ref 37–54)
DEPRECATED RDW RBC AUTO: 40 FL (ref 37–54)
DEPRECATED RDW RBC AUTO: 40.3 FL (ref 37–54)
DEPRECATED RDW RBC AUTO: 40.3 FL (ref 37–54)
DEPRECATED RDW RBC AUTO: 40.9 FL (ref 37–54)
DEPRECATED RDW RBC AUTO: 42 FL (ref 37–54)
DEPRECATED RDW RBC AUTO: 43.8 FL (ref 37–54)
DEPRECATED RDW RBC AUTO: 43.9 FL (ref 37–54)
DEPRECATED RDW RBC AUTO: 45.8 FL (ref 37–54)
DEPRECATED RDW RBC AUTO: 49.5 FL (ref 37–54)
DEPRECATED RDW RBC AUTO: 50.7 FL (ref 37–54)
DEPRECATED RDW RBC AUTO: 54.2 FL (ref 37–54)
DEPRECATED RDW RBC AUTO: 54.5 FL (ref 37–54)
DEPRECATED RDW RBC AUTO: 55.5 FL (ref 37–54)
DEPRECATED RDW RBC AUTO: 55.7 FL (ref 37–54)
DEPRECATED RDW RBC AUTO: 55.8 FL (ref 37–54)
DEPRECATED RDW RBC AUTO: 56.4 FL (ref 37–54)
DEPRECATED RDW RBC AUTO: 56.6 FL (ref 37–54)
DEPRECATED RDW RBC AUTO: 56.8 FL (ref 37–54)
DEPRECATED RDW RBC AUTO: 56.9 FL (ref 37–54)
DEPRECATED RDW RBC AUTO: 57.6 FL (ref 37–54)
DEPRECATED RDW RBC AUTO: 58.1 FL (ref 37–54)
DEPRECATED RDW RBC AUTO: 58.3 FL (ref 37–54)
DEPRECATED RDW RBC AUTO: 58.4 FL (ref 37–54)
DEPRECATED RDW RBC AUTO: 59.7 FL (ref 37–54)
DEPRECATED RDW RBC AUTO: 60.4 FL (ref 37–54)
DEPRECATED RDW RBC AUTO: 61 FL (ref 37–54)
DEPRECATED RDW RBC AUTO: 61.2 FL (ref 37–54)
DEPRECATED RDW RBC AUTO: 62.1 FL (ref 37–54)
DX PRELIMINARY: NORMAL
DX PRELIMINARY: NORMAL
EOSINOPHIL # BLD AUTO: 0 10*3/MM3 (ref 0–0.4)
EOSINOPHIL # BLD AUTO: 0.01 10*3/MM3 (ref 0–0.4)
EOSINOPHIL # BLD AUTO: 0.02 10*3/MM3 (ref 0–0.4)
EOSINOPHIL # BLD AUTO: 0.03 10*3/MM3 (ref 0–0.4)
EOSINOPHIL # BLD AUTO: 0.03 10*3/MM3 (ref 0–0.4)
EOSINOPHIL # BLD AUTO: 0.04 10*3/MM3 (ref 0–0.4)
EOSINOPHIL # BLD AUTO: 0.05 10*3/MM3 (ref 0–0.4)
EOSINOPHIL # BLD AUTO: 0.07 10*3/MM3 (ref 0–0.4)
EOSINOPHIL # BLD AUTO: 0.07 10*3/MM3 (ref 0–0.4)
EOSINOPHIL # BLD AUTO: 0.08 10*3/MM3 (ref 0–0.4)
EOSINOPHIL # BLD AUTO: 0.11 10*3/MM3 (ref 0–0.4)
EOSINOPHIL # BLD AUTO: 0.13 10*3/MM3 (ref 0–0.4)
EOSINOPHIL # BLD AUTO: 0.15 10*3/MM3 (ref 0–0.4)
EOSINOPHIL # BLD AUTO: 0.17 10*3/MM3 (ref 0–0.4)
EOSINOPHIL # BLD AUTO: 0.19 10*3/MM3 (ref 0–0.4)
EOSINOPHIL NFR BLD AUTO: 0 % (ref 0.3–6.2)
EOSINOPHIL NFR BLD AUTO: 0 % (ref 0.3–6.2)
EOSINOPHIL NFR BLD AUTO: 0.1 % (ref 0.3–6.2)
EOSINOPHIL NFR BLD AUTO: 0.2 % (ref 0.3–6.2)
EOSINOPHIL NFR BLD AUTO: 0.3 % (ref 0.3–6.2)
EOSINOPHIL NFR BLD AUTO: 0.4 % (ref 0.3–6.2)
EOSINOPHIL NFR BLD AUTO: 0.5 % (ref 0.3–6.2)
EOSINOPHIL NFR BLD AUTO: 0.6 % (ref 0.3–6.2)
EOSINOPHIL NFR BLD AUTO: 0.8 % (ref 0.3–6.2)
EOSINOPHIL NFR BLD AUTO: 0.8 % (ref 0.3–6.2)
EOSINOPHIL NFR BLD AUTO: 0.9 % (ref 0.3–6.2)
EOSINOPHIL NFR BLD AUTO: 1.6 % (ref 0.3–6.2)
EOSINOPHIL NFR BLD AUTO: 1.7 % (ref 0.3–6.2)
EOSINOPHIL NFR BLD AUTO: 12.3 % (ref 0.3–6.2)
EOSINOPHIL NFR BLD AUTO: 3.2 % (ref 0.3–6.2)
EOSINOPHIL NFR BLD AUTO: 3.4 % (ref 0.3–6.2)
EOSINOPHIL NFR BLD AUTO: 3.4 % (ref 0.3–6.2)
EOSINOPHIL NFR BLD AUTO: 3.5 % (ref 0.3–6.2)
EOSINOPHIL NFR BLD AUTO: 3.7 % (ref 0.3–6.2)
EOSINOPHIL NFR BLD AUTO: 5.8 % (ref 0.3–6.2)
EOSINOPHIL NFR BLD AUTO: 9.1 % (ref 0.3–6.2)
ERYTHROCYTE [DISTWIDTH] IN BLOOD BY AUTOMATED COUNT: 12.1 % (ref 12.3–15.4)
ERYTHROCYTE [DISTWIDTH] IN BLOOD BY AUTOMATED COUNT: 12.3 % (ref 12.3–15.4)
ERYTHROCYTE [DISTWIDTH] IN BLOOD BY AUTOMATED COUNT: 12.4 % (ref 12.3–15.4)
ERYTHROCYTE [DISTWIDTH] IN BLOOD BY AUTOMATED COUNT: 12.5 % (ref 12.3–15.4)
ERYTHROCYTE [DISTWIDTH] IN BLOOD BY AUTOMATED COUNT: 12.6 % (ref 12.3–15.4)
ERYTHROCYTE [DISTWIDTH] IN BLOOD BY AUTOMATED COUNT: 12.7 % (ref 12.3–15.4)
ERYTHROCYTE [DISTWIDTH] IN BLOOD BY AUTOMATED COUNT: 12.8 % (ref 12.3–15.4)
ERYTHROCYTE [DISTWIDTH] IN BLOOD BY AUTOMATED COUNT: 13.5 % (ref 12.3–15.4)
ERYTHROCYTE [DISTWIDTH] IN BLOOD BY AUTOMATED COUNT: 13.9 % (ref 12.3–15.4)
ERYTHROCYTE [DISTWIDTH] IN BLOOD BY AUTOMATED COUNT: 15.4 % (ref 12.3–15.4)
ERYTHROCYTE [DISTWIDTH] IN BLOOD BY AUTOMATED COUNT: 15.8 % (ref 12.3–15.4)
ERYTHROCYTE [DISTWIDTH] IN BLOOD BY AUTOMATED COUNT: 16.2 % (ref 12.3–15.4)
ERYTHROCYTE [DISTWIDTH] IN BLOOD BY AUTOMATED COUNT: 16.5 % (ref 12.3–15.4)
ERYTHROCYTE [DISTWIDTH] IN BLOOD BY AUTOMATED COUNT: 16.6 % (ref 12.3–15.4)
ERYTHROCYTE [DISTWIDTH] IN BLOOD BY AUTOMATED COUNT: 16.9 % (ref 12.3–15.4)
ERYTHROCYTE [DISTWIDTH] IN BLOOD BY AUTOMATED COUNT: 17.1 % (ref 12.3–15.4)
ERYTHROCYTE [DISTWIDTH] IN BLOOD BY AUTOMATED COUNT: 17.1 % (ref 12.3–15.4)
ERYTHROCYTE [DISTWIDTH] IN BLOOD BY AUTOMATED COUNT: 17.2 % (ref 12.3–15.4)
ERYTHROCYTE [DISTWIDTH] IN BLOOD BY AUTOMATED COUNT: 17.5 % (ref 12.3–15.4)
ERYTHROCYTE [DISTWIDTH] IN BLOOD BY AUTOMATED COUNT: 17.6 % (ref 12.3–15.4)
ERYTHROCYTE [DISTWIDTH] IN BLOOD BY AUTOMATED COUNT: 17.8 % (ref 12.3–15.4)
ERYTHROCYTE [DISTWIDTH] IN BLOOD BY AUTOMATED COUNT: 18 % (ref 12.3–15.4)
ERYTHROCYTE [DISTWIDTH] IN BLOOD BY AUTOMATED COUNT: 18 % (ref 12.3–15.4)
ERYTHROCYTE [DISTWIDTH] IN BLOOD BY AUTOMATED COUNT: 18.1 % (ref 12.3–15.4)
ERYTHROCYTE [DISTWIDTH] IN BLOOD BY AUTOMATED COUNT: 18.2 % (ref 12.3–15.4)
ERYTHROCYTE [DISTWIDTH] IN BLOOD BY AUTOMATED COUNT: 19 % (ref 12.3–15.4)
GFR SERPL CREATININE-BSD FRML MDRD: 43 ML/MIN/1.73
GFR SERPL CREATININE-BSD FRML MDRD: 44 ML/MIN/1.73
GFR SERPL CREATININE-BSD FRML MDRD: 46 ML/MIN/1.73
GFR SERPL CREATININE-BSD FRML MDRD: 57 ML/MIN/1.73
GFR SERPL CREATININE-BSD FRML MDRD: 57 ML/MIN/1.73
GFR SERPL CREATININE-BSD FRML MDRD: 60 ML/MIN/1.73
GFR SERPL CREATININE-BSD FRML MDRD: 61 ML/MIN/1.73
GFR SERPL CREATININE-BSD FRML MDRD: 63 ML/MIN/1.73
GFR SERPL CREATININE-BSD FRML MDRD: 66 ML/MIN/1.73
GFR SERPL CREATININE-BSD FRML MDRD: 67 ML/MIN/1.73
GFR SERPL CREATININE-BSD FRML MDRD: 68 ML/MIN/1.73
GFR SERPL CREATININE-BSD FRML MDRD: 70 ML/MIN/1.73
GFR SERPL CREATININE-BSD FRML MDRD: 70 ML/MIN/1.73
GFR SERPL CREATININE-BSD FRML MDRD: 71 ML/MIN/1.73
GFR SERPL CREATININE-BSD FRML MDRD: 75 ML/MIN/1.73
GFR SERPL CREATININE-BSD FRML MDRD: 76 ML/MIN/1.73
GFR SERPL CREATININE-BSD FRML MDRD: 77 ML/MIN/1.73
GFR SERPL CREATININE-BSD FRML MDRD: 77 ML/MIN/1.73
GFR SERPL CREATININE-BSD FRML MDRD: 81 ML/MIN/1.73
GFR SERPL CREATININE-BSD FRML MDRD: 85 ML/MIN/1.73
GFR SERPL CREATININE-BSD FRML MDRD: 88 ML/MIN/1.73
GLOBULIN UR ELPH-MCNC: 2.2 GM/DL
GLOBULIN UR ELPH-MCNC: 2.3 GM/DL (ref 1.8–3.5)
GLOBULIN UR ELPH-MCNC: 2.6 GM/DL (ref 1.8–3.5)
GLOBULIN UR ELPH-MCNC: 2.7 GM/DL
GLOBULIN UR ELPH-MCNC: 2.7 GM/DL (ref 1.8–3.5)
GLOBULIN UR ELPH-MCNC: 2.8 GM/DL (ref 1.8–3.5)
GLOBULIN UR ELPH-MCNC: 2.9 GM/DL (ref 1.8–3.5)
GLOBULIN UR ELPH-MCNC: 3 GM/DL
GLOBULIN UR ELPH-MCNC: 3 GM/DL (ref 1.8–3.5)
GLOBULIN UR ELPH-MCNC: 3.1 GM/DL
GLOBULIN UR ELPH-MCNC: 3.2 GM/DL
GLUCOSE BLDC GLUCOMTR-MCNC: 109 MG/DL (ref 70–130)
GLUCOSE BLDC GLUCOMTR-MCNC: 99 MG/DL (ref 70–130)
GLUCOSE SERPL-MCNC: 100 MG/DL (ref 74–124)
GLUCOSE SERPL-MCNC: 102 MG/DL (ref 74–124)
GLUCOSE SERPL-MCNC: 102 MG/DL (ref 74–124)
GLUCOSE SERPL-MCNC: 106 MG/DL (ref 65–99)
GLUCOSE SERPL-MCNC: 108 MG/DL (ref 65–99)
GLUCOSE SERPL-MCNC: 110 MG/DL (ref 74–124)
GLUCOSE SERPL-MCNC: 110 MG/DL (ref 74–124)
GLUCOSE SERPL-MCNC: 114 MG/DL (ref 74–124)
GLUCOSE SERPL-MCNC: 118 MG/DL (ref 74–124)
GLUCOSE SERPL-MCNC: 119 MG/DL (ref 74–124)
GLUCOSE SERPL-MCNC: 122 MG/DL (ref 74–124)
GLUCOSE SERPL-MCNC: 123 MG/DL (ref 74–124)
GLUCOSE SERPL-MCNC: 124 MG/DL (ref 65–99)
GLUCOSE SERPL-MCNC: 132 MG/DL (ref 74–124)
GLUCOSE SERPL-MCNC: 85 MG/DL (ref 74–124)
GLUCOSE SERPL-MCNC: 92 MG/DL (ref 65–99)
GLUCOSE SERPL-MCNC: 92 MG/DL (ref 74–124)
GLUCOSE SERPL-MCNC: 93 MG/DL (ref 74–124)
GLUCOSE SERPL-MCNC: 95 MG/DL (ref 65–99)
GLUCOSE SERPL-MCNC: 95 MG/DL (ref 74–124)
GLUCOSE SERPL-MCNC: 98 MG/DL (ref 74–124)
GRAM STN SPEC: NORMAL
HBV CORE AB SERPL QL IA: NEGATIVE
HBV SURFACE AB SER RIA-ACNC: NORMAL
HBV SURFACE AG SERPL QL IA: NORMAL
HCT VFR BLD AUTO: 22.6 % (ref 37.5–51)
HCT VFR BLD AUTO: 24.9 % (ref 37.5–51)
HCT VFR BLD AUTO: 27.2 % (ref 37.5–51)
HCT VFR BLD AUTO: 27.3 % (ref 37.5–51)
HCT VFR BLD AUTO: 28.8 % (ref 37.5–51)
HCT VFR BLD AUTO: 29.2 % (ref 37.5–51)
HCT VFR BLD AUTO: 30.1 % (ref 37.5–51)
HCT VFR BLD AUTO: 30.7 % (ref 37.5–51)
HCT VFR BLD AUTO: 31 % (ref 37.5–51)
HCT VFR BLD AUTO: 31.3 % (ref 37.5–51)
HCT VFR BLD AUTO: 31.6 % (ref 37.5–51)
HCT VFR BLD AUTO: 31.8 % (ref 37.5–51)
HCT VFR BLD AUTO: 31.8 % (ref 37.5–51)
HCT VFR BLD AUTO: 31.9 % (ref 37.5–51)
HCT VFR BLD AUTO: 32.8 % (ref 37.5–51)
HCT VFR BLD AUTO: 33.1 % (ref 37.5–51)
HCT VFR BLD AUTO: 33.2 % (ref 37.5–51)
HCT VFR BLD AUTO: 33.6 % (ref 37.5–51)
HCT VFR BLD AUTO: 33.8 % (ref 37.5–51)
HCT VFR BLD AUTO: 33.9 % (ref 37.5–51)
HCT VFR BLD AUTO: 34.2 % (ref 37.5–51)
HCT VFR BLD AUTO: 34.4 % (ref 37.5–51)
HCT VFR BLD AUTO: 34.5 % (ref 37.5–51)
HCT VFR BLD AUTO: 35.1 % (ref 37.5–51)
HCT VFR BLD AUTO: 38.2 % (ref 37.5–51)
HCT VFR BLD AUTO: 38.4 % (ref 37.5–51)
HCT VFR BLD AUTO: 39.1 % (ref 37.5–51)
HCT VFR BLD AUTO: 39.7 % (ref 37.5–51)
HCT VFR BLD AUTO: 40.2 % (ref 37.5–51)
HCT VFR BLD AUTO: 41.6 % (ref 37.5–51)
HCT VFR BLD AUTO: 42.5 % (ref 37.5–51)
HCT VFR BLD AUTO: 43.7 % (ref 37.5–51)
HGB BLD-MCNC: 10.5 G/DL (ref 13–17.7)
HGB BLD-MCNC: 10.5 G/DL (ref 13–17.7)
HGB BLD-MCNC: 10.6 G/DL (ref 13–17.7)
HGB BLD-MCNC: 10.7 G/DL (ref 13–17.7)
HGB BLD-MCNC: 10.7 G/DL (ref 13–17.7)
HGB BLD-MCNC: 10.8 G/DL (ref 13–17.7)
HGB BLD-MCNC: 10.9 G/DL (ref 13–17.7)
HGB BLD-MCNC: 10.9 G/DL (ref 13–17.7)
HGB BLD-MCNC: 11.1 G/DL (ref 13–17.7)
HGB BLD-MCNC: 11.1 G/DL (ref 13–17.7)
HGB BLD-MCNC: 11.3 G/DL (ref 13–17.7)
HGB BLD-MCNC: 11.4 G/DL (ref 13–17.7)
HGB BLD-MCNC: 11.5 G/DL (ref 13–17.7)
HGB BLD-MCNC: 11.5 G/DL (ref 13–17.7)
HGB BLD-MCNC: 11.7 G/DL (ref 13–17.7)
HGB BLD-MCNC: 11.8 G/DL (ref 13–17.7)
HGB BLD-MCNC: 11.9 G/DL (ref 13–17.7)
HGB BLD-MCNC: 13.3 G/DL (ref 13–17.7)
HGB BLD-MCNC: 13.5 G/DL (ref 13–17.7)
HGB BLD-MCNC: 14 G/DL (ref 13–17.7)
HGB BLD-MCNC: 14.1 G/DL (ref 13–17.7)
HGB BLD-MCNC: 14.1 G/DL (ref 13–17.7)
HGB BLD-MCNC: 14.4 G/DL (ref 13–17.7)
HGB BLD-MCNC: 14.6 G/DL (ref 13–17.7)
HGB BLD-MCNC: 15.2 G/DL (ref 13–17.7)
HGB BLD-MCNC: 7.2 G/DL (ref 13–17.7)
HGB BLD-MCNC: 7.7 G/DL (ref 13–17.7)
HGB BLD-MCNC: 8.6 G/DL (ref 13–17.7)
HGB BLD-MCNC: 9 G/DL (ref 13–17.7)
HGB BLD-MCNC: 9 G/DL (ref 13–17.7)
HGB BLD-MCNC: 9.4 G/DL (ref 13–17.7)
HGB BLD-MCNC: 9.5 G/DL (ref 13–17.7)
IMM GRANULOCYTES # BLD AUTO: 0.01 10*3/MM3 (ref 0–0.05)
IMM GRANULOCYTES # BLD AUTO: 0.02 10*3/MM3 (ref 0–0.05)
IMM GRANULOCYTES # BLD AUTO: 0.03 10*3/MM3 (ref 0–0.05)
IMM GRANULOCYTES # BLD AUTO: 0.04 10*3/MM3 (ref 0–0.05)
IMM GRANULOCYTES # BLD AUTO: 0.04 10*3/MM3 (ref 0–0.05)
IMM GRANULOCYTES # BLD AUTO: 0.05 10*3/MM3 (ref 0–0.05)
IMM GRANULOCYTES # BLD AUTO: 0.05 10*3/MM3 (ref 0–0.05)
IMM GRANULOCYTES # BLD AUTO: 0.06 10*3/MM3 (ref 0–0.05)
IMM GRANULOCYTES # BLD AUTO: 0.07 10*3/MM3 (ref 0–0.05)
IMM GRANULOCYTES # BLD AUTO: 0.08 10*3/MM3 (ref 0–0.05)
IMM GRANULOCYTES # BLD AUTO: 0.11 10*3/MM3 (ref 0–0.05)
IMM GRANULOCYTES # BLD AUTO: 0.13 10*3/MM3 (ref 0–0.05)
IMM GRANULOCYTES # BLD AUTO: 0.28 10*3/MM3 (ref 0–0.05)
IMM GRANULOCYTES # BLD AUTO: 0.34 10*3/MM3 (ref 0–0.05)
IMM GRANULOCYTES # BLD AUTO: 0.36 10*3/MM3 (ref 0–0.05)
IMM GRANULOCYTES # BLD AUTO: 0.44 10*3/MM3 (ref 0–0.05)
IMM GRANULOCYTES # BLD AUTO: 2.62 10*3/MM3 (ref 0–0.05)
IMM GRANULOCYTES # BLD AUTO: 2.71 10*3/MM3 (ref 0–0.05)
IMM GRANULOCYTES # BLD AUTO: 3.8 10*3/MM3 (ref 0–0.05)
IMM GRANULOCYTES # BLD AUTO: 4.74 10*3/MM3 (ref 0–0.05)
IMM GRANULOCYTES NFR BLD AUTO: 0.2 % (ref 0–0.5)
IMM GRANULOCYTES NFR BLD AUTO: 0.4 % (ref 0–0.5)
IMM GRANULOCYTES NFR BLD AUTO: 0.5 % (ref 0–0.5)
IMM GRANULOCYTES NFR BLD AUTO: 0.6 % (ref 0–0.5)
IMM GRANULOCYTES NFR BLD AUTO: 0.7 % (ref 0–0.5)
IMM GRANULOCYTES NFR BLD AUTO: 0.8 % (ref 0–0.5)
IMM GRANULOCYTES NFR BLD AUTO: 0.8 % (ref 0–0.5)
IMM GRANULOCYTES NFR BLD AUTO: 0.9 % (ref 0–0.5)
IMM GRANULOCYTES NFR BLD AUTO: 1.1 % (ref 0–0.5)
IMM GRANULOCYTES NFR BLD AUTO: 1.2 % (ref 0–0.5)
IMM GRANULOCYTES NFR BLD AUTO: 1.7 % (ref 0–0.5)
IMM GRANULOCYTES NFR BLD AUTO: 1.8 % (ref 0–0.5)
IMM GRANULOCYTES NFR BLD AUTO: 10.8 % (ref 0–0.5)
IMM GRANULOCYTES NFR BLD AUTO: 2.3 % (ref 0–0.5)
IMM GRANULOCYTES NFR BLD AUTO: 2.3 % (ref 0–0.5)
IMM GRANULOCYTES NFR BLD AUTO: 27.7 % (ref 0–0.5)
IMM GRANULOCYTES NFR BLD AUTO: 28.1 % (ref 0–0.5)
IMM GRANULOCYTES NFR BLD AUTO: 3.7 % (ref 0–0.5)
IMM GRANULOCYTES NFR BLD AUTO: 4.3 % (ref 0–0.5)
IMM GRANULOCYTES NFR BLD AUTO: 4.4 % (ref 0–0.5)
IMM GRANULOCYTES NFR BLD AUTO: 4.8 % (ref 0–0.5)
IMM GRANULOCYTES NFR BLD AUTO: 7.5 % (ref 0–0.5)
IMM GRANULOCYTES NFR BLD AUTO: 8.9 % (ref 0–0.5)
IMM GRANULOCYTES NFR BLD AUTO: 8.9 % (ref 0–0.5)
INR PPP: 1 (ref 0.8–1.2)
INR PPP: 1.19 (ref 0.9–1.1)
LAB AP CASE REPORT: NORMAL
LAB AP CLINICAL INFORMATION: NORMAL
LAB AP CLINICAL INFORMATION: NORMAL
LAB AP DIAGNOSIS COMMENT: NORMAL
LAB AP DIAGNOSIS COMMENT: NORMAL
LAB AP FLOW CYTOMETRY SUMMARY: NORMAL
LAB AP FLOW CYTOMETRY SUMMARY: NORMAL
LAB AP SPECIAL STAINS: NORMAL
LAB AP SPECIAL STAINS: NORMAL
LDH FLD-CCNC: >1066 U/L
LDH SERPL-CCNC: 1074 U/L (ref 135–225)
LDH SERPL-CCNC: 1190 U/L (ref 99–259)
LDH SERPL-CCNC: 154 U/L (ref 99–259)
LDH SERPL-CCNC: 189 U/L (ref 99–259)
LDH SERPL-CCNC: 189 U/L (ref 99–259)
LDH SERPL-CCNC: 548 U/L (ref 99–259)
LDH SERPL-CCNC: 560 U/L (ref 135–225)
LDH SERPL-CCNC: 647 U/L (ref 99–259)
LDH SERPL-CCNC: 691 U/L (ref 135–225)
LDH SERPL-CCNC: 730 U/L (ref 99–259)
LDH SERPL-CCNC: 791 U/L (ref 99–259)
LEFT ATRIUM VOLUME INDEX: 24 ML/M2
LEFT ATRIUM VOLUME: 56 CM3
LV EF 2D ECHO EST: 56 %
LYMPHOCYTES # BLD AUTO: 0.01 10*3/MM3 (ref 0.7–3.1)
LYMPHOCYTES # BLD AUTO: 0.01 10*3/MM3 (ref 0.7–3.1)
LYMPHOCYTES # BLD AUTO: 0.02 10*3/MM3 (ref 0.7–3.1)
LYMPHOCYTES # BLD AUTO: 0.03 10*3/MM3 (ref 0.7–3.1)
LYMPHOCYTES # BLD AUTO: 0.04 10*3/MM3 (ref 0.7–3.1)
LYMPHOCYTES # BLD AUTO: 0.05 10*3/MM3 (ref 0.7–3.1)
LYMPHOCYTES # BLD AUTO: 0.06 10*3/MM3 (ref 0.7–3.1)
LYMPHOCYTES # BLD AUTO: 0.06 10*3/MM3 (ref 0.7–3.1)
LYMPHOCYTES # BLD AUTO: 0.08 10*3/MM3 (ref 0.7–3.1)
LYMPHOCYTES # BLD AUTO: 0.08 10*3/MM3 (ref 0.7–3.1)
LYMPHOCYTES # BLD AUTO: 0.09 10*3/MM3 (ref 0.7–3.1)
LYMPHOCYTES # BLD AUTO: 0.09 10*3/MM3 (ref 0.7–3.1)
LYMPHOCYTES # BLD AUTO: 0.1 10*3/MM3 (ref 0.7–3.1)
LYMPHOCYTES # BLD AUTO: 0.12 10*3/MM3 (ref 0.7–3.1)
LYMPHOCYTES # BLD AUTO: 0.15 10*3/MM3 (ref 0.7–3.1)
LYMPHOCYTES # BLD AUTO: 0.15 10*3/MM3 (ref 0.7–3.1)
LYMPHOCYTES # BLD AUTO: 0.2 10*3/MM3 (ref 0.7–3.1)
LYMPHOCYTES # BLD AUTO: 0.21 10*3/MM3 (ref 0.7–3.1)
LYMPHOCYTES # BLD AUTO: 0.22 10*3/MM3 (ref 0.7–3.1)
LYMPHOCYTES # BLD AUTO: 0.23 10*3/MM3 (ref 0.7–3.1)
LYMPHOCYTES # BLD AUTO: 0.23 10*3/MM3 (ref 0.7–3.1)
LYMPHOCYTES # BLD AUTO: 0.27 10*3/MM3 (ref 0.7–3.1)
LYMPHOCYTES # BLD AUTO: 0.28 10*3/MM3 (ref 0.7–3.1)
LYMPHOCYTES # BLD AUTO: 0.31 10*3/MM3 (ref 0.7–3.1)
LYMPHOCYTES # BLD AUTO: 0.33 10*3/MM3 (ref 0.7–3.1)
LYMPHOCYTES # BLD AUTO: 0.41 10*3/MM3 (ref 0.7–3.1)
LYMPHOCYTES NFR BLD AUTO: 0.1 % (ref 19.6–45.3)
LYMPHOCYTES NFR BLD AUTO: 0.2 % (ref 19.6–45.3)
LYMPHOCYTES NFR BLD AUTO: 0.4 % (ref 19.6–45.3)
LYMPHOCYTES NFR BLD AUTO: 0.6 % (ref 19.6–45.3)
LYMPHOCYTES NFR BLD AUTO: 0.7 % (ref 19.6–45.3)
LYMPHOCYTES NFR BLD AUTO: 0.7 % (ref 19.6–45.3)
LYMPHOCYTES NFR BLD AUTO: 0.8 % (ref 19.6–45.3)
LYMPHOCYTES NFR BLD AUTO: 0.9 % (ref 19.6–45.3)
LYMPHOCYTES NFR BLD AUTO: 1.7 % (ref 19.6–45.3)
LYMPHOCYTES NFR BLD AUTO: 14 % (ref 19.6–45.3)
LYMPHOCYTES NFR BLD AUTO: 15.3 % (ref 19.6–45.3)
LYMPHOCYTES NFR BLD AUTO: 2.3 % (ref 19.6–45.3)
LYMPHOCYTES NFR BLD AUTO: 2.4 % (ref 19.6–45.3)
LYMPHOCYTES NFR BLD AUTO: 27.3 % (ref 19.6–45.3)
LYMPHOCYTES NFR BLD AUTO: 28.4 % (ref 19.6–45.3)
LYMPHOCYTES NFR BLD AUTO: 3 % (ref 19.6–45.3)
LYMPHOCYTES NFR BLD AUTO: 3.1 % (ref 19.6–45.3)
LYMPHOCYTES NFR BLD AUTO: 3.9 % (ref 19.6–45.3)
LYMPHOCYTES NFR BLD AUTO: 4 % (ref 19.6–45.3)
LYMPHOCYTES NFR BLD AUTO: 4.6 % (ref 19.6–45.3)
LYMPHOCYTES NFR BLD AUTO: 5 % (ref 19.6–45.3)
LYMPHOCYTES NFR BLD AUTO: 5.3 % (ref 19.6–45.3)
LYMPHOCYTES NFR BLD AUTO: 5.4 % (ref 19.6–45.3)
LYMPHOCYTES NFR BLD AUTO: 6 % (ref 19.6–45.3)
LYMPHOCYTES NFR BLD AUTO: 6.1 % (ref 19.6–45.3)
LYMPHOCYTES NFR BLD AUTO: 6.4 % (ref 19.6–45.3)
LYMPHOCYTES NFR BLD AUTO: 7.7 % (ref 19.6–45.3)
LYMPHOCYTES NFR BLD AUTO: 9.3 % (ref 19.6–45.3)
MAGNESIUM SERPL-MCNC: 2.1 MG/DL (ref 1.6–2.4)
MAGNESIUM SERPL-MCNC: 2.2 MG/DL (ref 1.6–2.4)
MAGNESIUM SERPL-MCNC: 2.4 MG/DL (ref 1.6–2.4)
MAXIMAL PREDICTED HEART RATE: 161 BPM
MCH RBC QN AUTO: 28.3 PG (ref 26.6–33)
MCH RBC QN AUTO: 28.4 PG (ref 26.6–33)
MCH RBC QN AUTO: 28.5 PG (ref 26.6–33)
MCH RBC QN AUTO: 28.7 PG (ref 26.6–33)
MCH RBC QN AUTO: 28.8 PG (ref 26.6–33)
MCH RBC QN AUTO: 29.1 PG (ref 26.6–33)
MCH RBC QN AUTO: 29.1 PG (ref 26.6–33)
MCH RBC QN AUTO: 29.4 PG (ref 26.6–33)
MCH RBC QN AUTO: 29.5 PG (ref 26.6–33)
MCH RBC QN AUTO: 29.6 PG (ref 26.6–33)
MCH RBC QN AUTO: 30.4 PG (ref 26.6–33)
MCH RBC QN AUTO: 30.7 PG (ref 26.6–33)
MCH RBC QN AUTO: 30.7 PG (ref 26.6–33)
MCH RBC QN AUTO: 30.8 PG (ref 26.6–33)
MCH RBC QN AUTO: 31 PG (ref 26.6–33)
MCH RBC QN AUTO: 31.2 PG (ref 26.6–33)
MCH RBC QN AUTO: 31.2 PG (ref 26.6–33)
MCH RBC QN AUTO: 31.4 PG (ref 26.6–33)
MCH RBC QN AUTO: 31.6 PG (ref 26.6–33)
MCH RBC QN AUTO: 31.8 PG (ref 26.6–33)
MCH RBC QN AUTO: 31.9 PG (ref 26.6–33)
MCH RBC QN AUTO: 32.4 PG (ref 26.6–33)
MCH RBC QN AUTO: 32.5 PG (ref 26.6–33)
MCH RBC QN AUTO: 32.6 PG (ref 26.6–33)
MCH RBC QN AUTO: 32.8 PG (ref 26.6–33)
MCH RBC QN AUTO: 33.1 PG (ref 26.6–33)
MCH RBC QN AUTO: 33.7 PG (ref 26.6–33)
MCH RBC QN AUTO: 34.2 PG (ref 26.6–33)
MCHC RBC AUTO-ENTMCNC: 30.5 G/DL (ref 31.5–35.7)
MCHC RBC AUTO-ENTMCNC: 30.9 G/DL (ref 31.5–35.7)
MCHC RBC AUTO-ENTMCNC: 30.9 G/DL (ref 31.5–35.7)
MCHC RBC AUTO-ENTMCNC: 31.3 G/DL (ref 31.5–35.7)
MCHC RBC AUTO-ENTMCNC: 31.5 G/DL (ref 31.5–35.7)
MCHC RBC AUTO-ENTMCNC: 31.8 G/DL (ref 31.5–35.7)
MCHC RBC AUTO-ENTMCNC: 31.9 G/DL (ref 31.5–35.7)
MCHC RBC AUTO-ENTMCNC: 31.9 G/DL (ref 31.5–35.7)
MCHC RBC AUTO-ENTMCNC: 32.2 G/DL (ref 31.5–35.7)
MCHC RBC AUTO-ENTMCNC: 32.8 G/DL (ref 31.5–35.7)
MCHC RBC AUTO-ENTMCNC: 33.1 G/DL (ref 31.5–35.7)
MCHC RBC AUTO-ENTMCNC: 33.4 G/DL (ref 31.5–35.7)
MCHC RBC AUTO-ENTMCNC: 33.5 G/DL (ref 31.5–35.7)
MCHC RBC AUTO-ENTMCNC: 33.6 G/DL (ref 31.5–35.7)
MCHC RBC AUTO-ENTMCNC: 33.9 G/DL (ref 31.5–35.7)
MCHC RBC AUTO-ENTMCNC: 34.2 G/DL (ref 31.5–35.7)
MCHC RBC AUTO-ENTMCNC: 34.6 G/DL (ref 31.5–35.7)
MCHC RBC AUTO-ENTMCNC: 34.8 G/DL (ref 31.5–35.7)
MCHC RBC AUTO-ENTMCNC: 34.8 G/DL (ref 31.5–35.7)
MCHC RBC AUTO-ENTMCNC: 34.9 G/DL (ref 31.5–35.7)
MCHC RBC AUTO-ENTMCNC: 35.1 G/DL (ref 31.5–35.7)
MCHC RBC AUTO-ENTMCNC: 35.1 G/DL (ref 31.5–35.7)
MCHC RBC AUTO-ENTMCNC: 35.3 G/DL (ref 31.5–35.7)
MCHC RBC AUTO-ENTMCNC: 35.5 G/DL (ref 31.5–35.7)
MCHC RBC AUTO-ENTMCNC: 35.8 G/DL (ref 31.5–35.7)
MCHC RBC AUTO-ENTMCNC: 36 G/DL (ref 31.5–35.7)
MCV RBC AUTO: 86.4 FL (ref 79–97)
MCV RBC AUTO: 86.5 FL (ref 79–97)
MCV RBC AUTO: 86.6 FL (ref 79–97)
MCV RBC AUTO: 87.5 FL (ref 79–97)
MCV RBC AUTO: 87.7 FL (ref 79–97)
MCV RBC AUTO: 88.8 FL (ref 79–97)
MCV RBC AUTO: 88.9 FL (ref 79–97)
MCV RBC AUTO: 89.2 FL (ref 79–97)
MCV RBC AUTO: 89.2 FL (ref 79–97)
MCV RBC AUTO: 90.4 FL (ref 79–97)
MCV RBC AUTO: 90.4 FL (ref 79–97)
MCV RBC AUTO: 90.5 FL (ref 79–97)
MCV RBC AUTO: 90.6 FL (ref 79–97)
MCV RBC AUTO: 90.9 FL (ref 79–97)
MCV RBC AUTO: 91 FL (ref 79–97)
MCV RBC AUTO: 91.3 FL (ref 79–97)
MCV RBC AUTO: 91.9 FL (ref 79–97)
MCV RBC AUTO: 91.9 FL (ref 79–97)
MCV RBC AUTO: 92.2 FL (ref 79–97)
MCV RBC AUTO: 92.7 FL (ref 79–97)
MCV RBC AUTO: 92.9 FL (ref 79–97)
MCV RBC AUTO: 93.2 FL (ref 79–97)
MCV RBC AUTO: 93.4 FL (ref 79–97)
MCV RBC AUTO: 93.6 FL (ref 79–97)
MCV RBC AUTO: 93.8 FL (ref 79–97)
MCV RBC AUTO: 94.3 FL (ref 79–97)
MCV RBC AUTO: 95 FL (ref 79–97)
MCV RBC AUTO: 95 FL (ref 79–97)
MCV RBC AUTO: 95.1 FL (ref 79–97)
MCV RBC AUTO: 95.7 FL (ref 79–97)
MCV RBC AUTO: 96.6 FL (ref 79–97)
MCV RBC AUTO: 98 FL (ref 79–97)
METHOD: ABNORMAL
METHOD: ABNORMAL
MONOCYTES # BLD AUTO: 0.04 10*3/MM3 (ref 0.1–0.9)
MONOCYTES # BLD AUTO: 0.06 10*3/MM3 (ref 0.1–0.9)
MONOCYTES # BLD AUTO: 0.08 10*3/MM3 (ref 0.1–0.9)
MONOCYTES # BLD AUTO: 0.08 10*3/MM3 (ref 0.1–0.9)
MONOCYTES # BLD AUTO: 0.09 10*3/MM3 (ref 0.1–0.9)
MONOCYTES # BLD AUTO: 0.09 10*3/MM3 (ref 0.1–0.9)
MONOCYTES # BLD AUTO: 0.11 10*3/MM3 (ref 0.1–0.9)
MONOCYTES # BLD AUTO: 0.12 10*3/MM3 (ref 0.1–0.9)
MONOCYTES # BLD AUTO: 0.21 10*3/MM3 (ref 0.1–0.9)
MONOCYTES # BLD AUTO: 0.23 10*3/MM3 (ref 0.1–0.9)
MONOCYTES # BLD AUTO: 0.29 10*3/MM3 (ref 0.1–0.9)
MONOCYTES # BLD AUTO: 0.3 10*3/MM3 (ref 0.1–0.9)
MONOCYTES # BLD AUTO: 0.41 10*3/MM3 (ref 0.1–0.9)
MONOCYTES # BLD AUTO: 0.44 10*3/MM3 (ref 0.1–0.9)
MONOCYTES # BLD AUTO: 0.47 10*3/MM3 (ref 0.1–0.9)
MONOCYTES # BLD AUTO: 0.48 10*3/MM3 (ref 0.1–0.9)
MONOCYTES # BLD AUTO: 0.5 10*3/MM3 (ref 0.1–0.9)
MONOCYTES # BLD AUTO: 0.55 10*3/MM3 (ref 0.1–0.9)
MONOCYTES # BLD AUTO: 0.56 10*3/MM3 (ref 0.1–0.9)
MONOCYTES # BLD AUTO: 0.59 10*3/MM3 (ref 0.1–0.9)
MONOCYTES # BLD AUTO: 0.59 10*3/MM3 (ref 0.1–0.9)
MONOCYTES # BLD AUTO: 0.64 10*3/MM3 (ref 0.1–0.9)
MONOCYTES # BLD AUTO: 0.64 10*3/MM3 (ref 0.1–0.9)
MONOCYTES # BLD AUTO: 0.65 10*3/MM3 (ref 0.1–0.9)
MONOCYTES # BLD AUTO: 0.67 10*3/MM3 (ref 0.1–0.9)
MONOCYTES # BLD AUTO: 0.71 10*3/MM3 (ref 0.1–0.9)
MONOCYTES # BLD AUTO: 0.75 10*3/MM3 (ref 0.1–0.9)
MONOCYTES # BLD AUTO: 0.86 10*3/MM3 (ref 0.1–0.9)
MONOCYTES # BLD AUTO: 0.86 10*3/MM3 (ref 0.1–0.9)
MONOCYTES # BLD AUTO: 1.01 10*3/MM3 (ref 0.1–0.9)
MONOCYTES # BLD AUTO: 2.65 10*3/MM3 (ref 0.1–0.9)
MONOCYTES # BLD AUTO: 2.71 10*3/MM3 (ref 0.1–0.9)
MONOCYTES NFR BLD AUTO: 0.3 % (ref 5–12)
MONOCYTES NFR BLD AUTO: 0.4 % (ref 5–12)
MONOCYTES NFR BLD AUTO: 10.2 % (ref 5–12)
MONOCYTES NFR BLD AUTO: 11 % (ref 5–12)
MONOCYTES NFR BLD AUTO: 12.6 % (ref 5–12)
MONOCYTES NFR BLD AUTO: 12.7 % (ref 5–12)
MONOCYTES NFR BLD AUTO: 12.7 % (ref 5–12)
MONOCYTES NFR BLD AUTO: 13.2 % (ref 5–12)
MONOCYTES NFR BLD AUTO: 14 % (ref 5–12)
MONOCYTES NFR BLD AUTO: 14.1 % (ref 5–12)
MONOCYTES NFR BLD AUTO: 14.3 % (ref 5–12)
MONOCYTES NFR BLD AUTO: 14.3 % (ref 5–12)
MONOCYTES NFR BLD AUTO: 15.5 % (ref 5–12)
MONOCYTES NFR BLD AUTO: 16 % (ref 5–12)
MONOCYTES NFR BLD AUTO: 18 % (ref 5–12)
MONOCYTES NFR BLD AUTO: 18.6 % (ref 5–12)
MONOCYTES NFR BLD AUTO: 2.2 % (ref 5–12)
MONOCYTES NFR BLD AUTO: 2.7 % (ref 5–12)
MONOCYTES NFR BLD AUTO: 20.1 % (ref 5–12)
MONOCYTES NFR BLD AUTO: 25 % (ref 5–12)
MONOCYTES NFR BLD AUTO: 3.6 % (ref 5–12)
MONOCYTES NFR BLD AUTO: 4.7 % (ref 5–12)
MONOCYTES NFR BLD AUTO: 5.5 % (ref 5–12)
MONOCYTES NFR BLD AUTO: 5.7 % (ref 5–12)
MONOCYTES NFR BLD AUTO: 5.8 % (ref 5–12)
MONOCYTES NFR BLD AUTO: 6 % (ref 5–12)
MONOCYTES NFR BLD AUTO: 6.1 % (ref 5–12)
MONOCYTES NFR BLD AUTO: 6.4 % (ref 5–12)
MONOCYTES NFR BLD AUTO: 6.8 % (ref 5–12)
MONOCYTES NFR BLD AUTO: 7.2 % (ref 5–12)
MONOCYTES NFR BLD AUTO: 9.2 % (ref 5–12)
MONOCYTES NFR BLD AUTO: 9.4 % (ref 5–12)
MONOS+MACROS NFR FLD: 100 %
MONOS+MACROS NFR FLD: 98 %
NEUTROPHILS NFR BLD AUTO: 0.14 10*3/MM3 (ref 1.7–7)
NEUTROPHILS NFR BLD AUTO: 0.3 10*3/MM3 (ref 1.7–7)
NEUTROPHILS NFR BLD AUTO: 0.39 10*3/MM3 (ref 1.7–7)
NEUTROPHILS NFR BLD AUTO: 0.54 10*3/MM3 (ref 1.7–7)
NEUTROPHILS NFR BLD AUTO: 0.65 10*3/MM3 (ref 1.7–7)
NEUTROPHILS NFR BLD AUTO: 0.93 10*3/MM3 (ref 1.7–7)
NEUTROPHILS NFR BLD AUTO: 10.46 10*3/MM3 (ref 1.7–7)
NEUTROPHILS NFR BLD AUTO: 12.79 10*3/MM3 (ref 1.7–7)
NEUTROPHILS NFR BLD AUTO: 2.49 10*3/MM3 (ref 1.7–7)
NEUTROPHILS NFR BLD AUTO: 21.37 10*3/MM3 (ref 1.7–7)
NEUTROPHILS NFR BLD AUTO: 27.19 10*3/MM3 (ref 1.7–7)
NEUTROPHILS NFR BLD AUTO: 3.27 10*3/MM3 (ref 1.7–7)
NEUTROPHILS NFR BLD AUTO: 3.49 10*3/MM3 (ref 1.7–7)
NEUTROPHILS NFR BLD AUTO: 3.5 10*3/MM3 (ref 1.7–7)
NEUTROPHILS NFR BLD AUTO: 3.53 10*3/MM3 (ref 1.7–7)
NEUTROPHILS NFR BLD AUTO: 3.6 10*3/MM3 (ref 1.7–7)
NEUTROPHILS NFR BLD AUTO: 3.96 10*3/MM3 (ref 1.7–7)
NEUTROPHILS NFR BLD AUTO: 31.8 % (ref 42.7–76)
NEUTROPHILS NFR BLD AUTO: 4.03 10*3/MM3 (ref 1.7–7)
NEUTROPHILS NFR BLD AUTO: 4.27 10*3/MM3 (ref 1.7–7)
NEUTROPHILS NFR BLD AUTO: 4.38 10*3/MM3 (ref 1.7–7)
NEUTROPHILS NFR BLD AUTO: 4.49 10*3/MM3 (ref 1.7–7)
NEUTROPHILS NFR BLD AUTO: 4.56 10*3/MM3 (ref 1.7–7)
NEUTROPHILS NFR BLD AUTO: 49.5 % (ref 42.7–76)
NEUTROPHILS NFR BLD AUTO: 49.9 % (ref 42.7–76)
NEUTROPHILS NFR BLD AUTO: 5.14 10*3/MM3 (ref 1.7–7)
NEUTROPHILS NFR BLD AUTO: 5.31 10*3/MM3 (ref 1.7–7)
NEUTROPHILS NFR BLD AUTO: 5.42 10*3/MM3 (ref 1.7–7)
NEUTROPHILS NFR BLD AUTO: 52.6 % (ref 42.7–76)
NEUTROPHILS NFR BLD AUTO: 54.1 % (ref 42.7–76)
NEUTROPHILS NFR BLD AUTO: 6.75 10*3/MM3 (ref 1.7–7)
NEUTROPHILS NFR BLD AUTO: 66 % (ref 42.7–76)
NEUTROPHILS NFR BLD AUTO: 68.7 % (ref 42.7–76)
NEUTROPHILS NFR BLD AUTO: 7.36 10*3/MM3 (ref 1.7–7)
NEUTROPHILS NFR BLD AUTO: 7.38 10*3/MM3 (ref 1.7–7)
NEUTROPHILS NFR BLD AUTO: 7.88 10*3/MM3 (ref 1.7–7)
NEUTROPHILS NFR BLD AUTO: 72.1 % (ref 42.7–76)
NEUTROPHILS NFR BLD AUTO: 73.3 % (ref 42.7–76)
NEUTROPHILS NFR BLD AUTO: 73.5 % (ref 42.7–76)
NEUTROPHILS NFR BLD AUTO: 75.5 % (ref 42.7–76)
NEUTROPHILS NFR BLD AUTO: 75.7 % (ref 42.7–76)
NEUTROPHILS NFR BLD AUTO: 77.1 % (ref 42.7–76)
NEUTROPHILS NFR BLD AUTO: 79.5 % (ref 42.7–76)
NEUTROPHILS NFR BLD AUTO: 79.6 % (ref 42.7–76)
NEUTROPHILS NFR BLD AUTO: 8.43 10*3/MM3 (ref 1.7–7)
NEUTROPHILS NFR BLD AUTO: 80.3 % (ref 42.7–76)
NEUTROPHILS NFR BLD AUTO: 81 % (ref 42.7–76)
NEUTROPHILS NFR BLD AUTO: 83 % (ref 42.7–76)
NEUTROPHILS NFR BLD AUTO: 87.9 % (ref 42.7–76)
NEUTROPHILS NFR BLD AUTO: 89.3 % (ref 42.7–76)
NEUTROPHILS NFR BLD AUTO: 9.27 10*3/MM3 (ref 1.7–7)
NEUTROPHILS NFR BLD AUTO: 9.91 10*3/MM3 (ref 1.7–7)
NEUTROPHILS NFR BLD AUTO: 90 % (ref 42.7–76)
NEUTROPHILS NFR BLD AUTO: 90.8 % (ref 42.7–76)
NEUTROPHILS NFR BLD AUTO: 91.8 % (ref 42.7–76)
NEUTROPHILS NFR BLD AUTO: 91.8 % (ref 42.7–76)
NEUTROPHILS NFR BLD AUTO: 92.1 % (ref 42.7–76)
NEUTROPHILS NFR BLD AUTO: 92.2 % (ref 42.7–76)
NEUTROPHILS NFR BLD AUTO: 92.8 % (ref 42.7–76)
NEUTROPHILS NFR BLD AUTO: 94.3 % (ref 42.7–76)
NEUTROPHILS NFR BLD AUTO: 95.4 % (ref 42.7–76)
NEUTROPHILS NFR BLD AUTO: 96 % (ref 42.7–76)
NEUTROPHILS NFR FLD MANUAL: 2 %
NRBC BLD AUTO-RTO: 0 /100 WBC (ref 0–0.2)
NT-PROBNP SERPL-MCNC: 1351 PG/ML (ref 0–900)
NT-PROBNP SERPL-MCNC: 636.4 PG/ML (ref 0–900)
NUC CELL # FLD: 4400 /MM3
NUC CELL # FLD: 5123 /MM3
PATH REPORT.ADDENDUM SPEC: NORMAL
PATH REPORT.FINAL DX SPEC: NORMAL
PATH REPORT.GROSS SPEC: NORMAL
PHOSPHATE SERPL-MCNC: 2.7 MG/DL (ref 2.5–4.5)
PHOSPHATE SERPL-MCNC: 3.1 MG/DL (ref 2.5–4.5)
PHOSPHATE SERPL-MCNC: 3.1 MG/DL (ref 2.5–4.5)
PHOSPHATE SERPL-MCNC: 3.5 MG/DL (ref 2.5–4.5)
PHOSPHATE SERPL-MCNC: 3.7 MG/DL (ref 2.5–4.5)
PLATELET # BLD AUTO: 108 10*3/MM3 (ref 140–450)
PLATELET # BLD AUTO: 112 10*3/MM3 (ref 140–450)
PLATELET # BLD AUTO: 117 10*3/MM3 (ref 140–450)
PLATELET # BLD AUTO: 123 10*3/MM3 (ref 140–450)
PLATELET # BLD AUTO: 124 10*3/MM3 (ref 140–450)
PLATELET # BLD AUTO: 140 10*3/MM3 (ref 140–450)
PLATELET # BLD AUTO: 140 10*3/MM3 (ref 140–450)
PLATELET # BLD AUTO: 141 10*3/MM3 (ref 140–450)
PLATELET # BLD AUTO: 149 10*3/MM3 (ref 140–450)
PLATELET # BLD AUTO: 155 10*3/MM3 (ref 140–450)
PLATELET # BLD AUTO: 162 10*3/MM3 (ref 140–450)
PLATELET # BLD AUTO: 178 10*3/MM3 (ref 140–450)
PLATELET # BLD AUTO: 190 10*3/MM3 (ref 140–450)
PLATELET # BLD AUTO: 190 10*3/MM3 (ref 140–450)
PLATELET # BLD AUTO: 197 10*3/MM3 (ref 140–450)
PLATELET # BLD AUTO: 201 10*3/MM3 (ref 140–450)
PLATELET # BLD AUTO: 209 10*3/MM3 (ref 140–450)
PLATELET # BLD AUTO: 210 10*3/MM3 (ref 140–450)
PLATELET # BLD AUTO: 225 10*3/MM3 (ref 140–450)
PLATELET # BLD AUTO: 228 10*3/MM3 (ref 140–450)
PLATELET # BLD AUTO: 237 10*3/MM3 (ref 140–450)
PLATELET # BLD AUTO: 246 10*3/MM3 (ref 140–450)
PLATELET # BLD AUTO: 277 10*3/MM3 (ref 140–450)
PLATELET # BLD AUTO: 286 10*3/MM3 (ref 140–450)
PLATELET # BLD AUTO: 292 10*3/MM3 (ref 140–450)
PLATELET # BLD AUTO: 301 10*3/MM3 (ref 140–450)
PLATELET # BLD AUTO: 32 10*3/MM3 (ref 140–450)
PLATELET # BLD AUTO: 34 10*3/MM3 (ref 140–450)
PLATELET # BLD AUTO: 409 10*3/MM3 (ref 140–450)
PLATELET # BLD AUTO: 61 10*3/MM3 (ref 140–450)
PLATELET # BLD AUTO: 93 10*3/MM3 (ref 140–450)
PLATELET # BLD AUTO: 96 10*3/MM3 (ref 140–450)
PMV BLD AUTO: 10 FL (ref 6–12)
PMV BLD AUTO: 10.1 FL (ref 6–12)
PMV BLD AUTO: 10.2 FL (ref 6–12)
PMV BLD AUTO: 10.2 FL (ref 6–12)
PMV BLD AUTO: 10.3 FL (ref 6–12)
PMV BLD AUTO: 10.3 FL (ref 6–12)
PMV BLD AUTO: 10.4 FL (ref 6–12)
PMV BLD AUTO: 10.5 FL (ref 6–12)
PMV BLD AUTO: 10.5 FL (ref 6–12)
PMV BLD AUTO: 10.6 FL (ref 6–12)
PMV BLD AUTO: 10.7 FL (ref 6–12)
PMV BLD AUTO: 10.7 FL (ref 6–12)
PMV BLD AUTO: 10.8 FL (ref 6–12)
PMV BLD AUTO: 10.9 FL (ref 6–12)
PMV BLD AUTO: 11.1 FL (ref 6–12)
PMV BLD AUTO: 11.2 FL (ref 6–12)
PMV BLD AUTO: 11.4 FL (ref 6–12)
PMV BLD AUTO: 8.8 FL (ref 6–12)
PMV BLD AUTO: 9 FL (ref 6–12)
PMV BLD AUTO: 9.3 FL (ref 6–12)
PMV BLD AUTO: 9.4 FL (ref 6–12)
PMV BLD AUTO: 9.5 FL (ref 6–12)
PMV BLD AUTO: 9.6 FL (ref 6–12)
PMV BLD AUTO: 9.8 FL (ref 6–12)
PMV BLD AUTO: 9.8 FL (ref 6–12)
PMV BLD AUTO: 9.9 FL (ref 6–12)
POTASSIUM SERPL-SCNC: 3.3 MMOL/L (ref 3.5–4.7)
POTASSIUM SERPL-SCNC: 3.3 MMOL/L (ref 3.5–5.2)
POTASSIUM SERPL-SCNC: 3.4 MMOL/L (ref 3.5–4.7)
POTASSIUM SERPL-SCNC: 3.4 MMOL/L (ref 3.5–4.7)
POTASSIUM SERPL-SCNC: 3.4 MMOL/L (ref 3.5–5.2)
POTASSIUM SERPL-SCNC: 3.5 MMOL/L (ref 3.5–4.7)
POTASSIUM SERPL-SCNC: 3.5 MMOL/L (ref 3.5–5.2)
POTASSIUM SERPL-SCNC: 3.6 MMOL/L (ref 3.5–4.7)
POTASSIUM SERPL-SCNC: 3.6 MMOL/L (ref 3.5–5.2)
POTASSIUM SERPL-SCNC: 3.7 MMOL/L (ref 3.5–4.7)
POTASSIUM SERPL-SCNC: 3.8 MMOL/L (ref 3.5–4.7)
POTASSIUM SERPL-SCNC: 3.9 MMOL/L (ref 3.5–4.7)
POTASSIUM SERPL-SCNC: 4 MMOL/L (ref 3.5–4.7)
POTASSIUM SERPL-SCNC: 4.3 MMOL/L (ref 3.5–4.7)
POTASSIUM SERPL-SCNC: 4.4 MMOL/L (ref 3.5–5.2)
PROCALCITONIN SERPL-MCNC: 0.34 NG/ML (ref 0–0.25)
PROCALCITONIN SERPL-MCNC: 0.73 NG/ML (ref 0–0.25)
PROT FLD-MCNC: 3.9 G/DL
PROT FLD-MCNC: 4.4 G/DL
PROT SERPL-MCNC: 5.7 G/DL (ref 6–8.5)
PROT SERPL-MCNC: 6.2 G/DL (ref 6.3–8)
PROT SERPL-MCNC: 6.2 G/DL (ref 6–8.5)
PROT SERPL-MCNC: 6.4 G/DL (ref 6.3–8)
PROT SERPL-MCNC: 6.5 G/DL (ref 6.3–8)
PROT SERPL-MCNC: 6.5 G/DL (ref 6–8.5)
PROT SERPL-MCNC: 6.6 G/DL (ref 6.3–8)
PROT SERPL-MCNC: 6.7 G/DL (ref 6–8.5)
PROT SERPL-MCNC: 6.7 G/DL (ref 6–8.5)
PROT SERPL-MCNC: 6.8 G/DL (ref 6.3–8)
PROT SERPL-MCNC: 6.9 G/DL (ref 6.3–8)
PROT SERPL-MCNC: 7 G/DL (ref 6.3–8)
PROT SERPL-MCNC: 7 G/DL (ref 6.3–8)
PROT SERPL-MCNC: 7.2 G/DL (ref 6.3–8)
PROT SERPL-MCNC: 7.3 G/DL (ref 6.3–8)
PROT SERPL-MCNC: 7.4 G/DL (ref 6.3–8)
PROTHROMBIN TIME: 11.9 SECONDS (ref 12.8–15.2)
PROTHROMBIN TIME: 14.9 SECONDS (ref 11.7–14.2)
QT INTERVAL: 325 MS
RBC # BLD AUTO: 2.5 10*6/MM3 (ref 4.14–5.8)
RBC # BLD AUTO: 2.71 10*6/MM3 (ref 4.14–5.8)
RBC # BLD AUTO: 3 10*6/MM3 (ref 4.14–5.8)
RBC # BLD AUTO: 3.05 10*6/MM3 (ref 4.14–5.8)
RBC # BLD AUTO: 3.18 10*6/MM3 (ref 4.14–5.8)
RBC # BLD AUTO: 3.23 10*6/MM3 (ref 4.14–5.8)
RBC # BLD AUTO: 3.29 10*6/MM3 (ref 4.14–5.8)
RBC # BLD AUTO: 3.32 10*6/MM3 (ref 4.14–5.8)
RBC # BLD AUTO: 3.43 10*6/MM3 (ref 4.14–5.8)
RBC # BLD AUTO: 3.45 10*6/MM3 (ref 4.14–5.8)
RBC # BLD AUTO: 3.47 10*6/MM3 (ref 4.14–5.8)
RBC # BLD AUTO: 3.47 10*6/MM3 (ref 4.14–5.8)
RBC # BLD AUTO: 3.49 10*6/MM3 (ref 4.14–5.8)
RBC # BLD AUTO: 3.57 10*6/MM3 (ref 4.14–5.8)
RBC # BLD AUTO: 3.6 10*6/MM3 (ref 4.14–5.8)
RBC # BLD AUTO: 3.65 10*6/MM3 (ref 4.14–5.8)
RBC # BLD AUTO: 3.68 10*6/MM3 (ref 4.14–5.8)
RBC # BLD AUTO: 3.68 10*6/MM3 (ref 4.14–5.8)
RBC # BLD AUTO: 3.73 10*6/MM3 (ref 4.14–5.8)
RBC # BLD AUTO: 3.75 10*6/MM3 (ref 4.14–5.8)
RBC # BLD AUTO: 3.75 10*6/MM3 (ref 4.14–5.8)
RBC # BLD AUTO: 3.82 10*6/MM3 (ref 4.14–5.8)
RBC # BLD AUTO: 4.3 10*6/MM3 (ref 4.14–5.8)
RBC # BLD AUTO: 4.3 10*6/MM3 (ref 4.14–5.8)
RBC # BLD AUTO: 4.38 10*6/MM3 (ref 4.14–5.8)
RBC # BLD AUTO: 4.41 10*6/MM3 (ref 4.14–5.8)
RBC # BLD AUTO: 4.52 10*6/MM3 (ref 4.14–5.8)
RBC # BLD AUTO: 4.53 10*6/MM3 (ref 4.14–5.8)
RBC # BLD AUTO: 4.59 10*6/MM3 (ref 4.14–5.8)
RBC # BLD AUTO: 4.9 10*6/MM3 (ref 4.14–5.8)
RBC # FLD AUTO: 4021 /MM3
RBC # FLD AUTO: 9600 /MM3
RH BLD: POSITIVE
RH BLD: POSITIVE
SARS-COV-2 ORF1AB RESP QL NAA+PROBE: NOT DETECTED
SARS-COV-2 ORF1AB RESP QL NAA+PROBE: NOT DETECTED
SARS-COV-2 RNA PNL SPEC NAA+PROBE: NOT DETECTED
SODIUM SERPL-SCNC: 132 MMOL/L (ref 134–145)
SODIUM SERPL-SCNC: 132 MMOL/L (ref 134–145)
SODIUM SERPL-SCNC: 132 MMOL/L (ref 136–145)
SODIUM SERPL-SCNC: 134 MMOL/L (ref 134–145)
SODIUM SERPL-SCNC: 135 MMOL/L (ref 134–145)
SODIUM SERPL-SCNC: 135 MMOL/L (ref 136–145)
SODIUM SERPL-SCNC: 135 MMOL/L (ref 136–145)
SODIUM SERPL-SCNC: 136 MMOL/L (ref 134–145)
SODIUM SERPL-SCNC: 136 MMOL/L (ref 134–145)
SODIUM SERPL-SCNC: 139 MMOL/L (ref 134–145)
SODIUM SERPL-SCNC: 139 MMOL/L (ref 134–145)
SODIUM SERPL-SCNC: 139 MMOL/L (ref 136–145)
SODIUM SERPL-SCNC: 139 MMOL/L (ref 136–145)
SODIUM SERPL-SCNC: 140 MMOL/L (ref 134–145)
SODIUM SERPL-SCNC: 141 MMOL/L (ref 134–145)
SODIUM SERPL-SCNC: 141 MMOL/L (ref 134–145)
SODIUM SERPL-SCNC: 142 MMOL/L (ref 134–145)
SODIUM SERPL-SCNC: 143 MMOL/L (ref 134–145)
SODIUM SERPL-SCNC: 143 MMOL/L (ref 134–145)
STRESS TARGET HR: 137 BPM
T&S EXPIRATION DATE: NORMAL
TROPONIN T SERPL-MCNC: <0.01 NG/ML (ref 0–0.03)
TSH SERPL DL<=0.05 MIU/L-ACNC: 2.09 UIU/ML (ref 0.27–4.2)
UNIT  ABO: NORMAL
UNIT  ABO: NORMAL
UNIT  RH: NORMAL
UNIT  RH: NORMAL
URATE SERPL-MCNC: 6.1 MG/DL (ref 3.4–7)
URATE SERPL-MCNC: 6.3 MG/DL (ref 2.8–7.4)
URATE SERPL-MCNC: 6.3 MG/DL (ref 3.4–7)
URATE SERPL-MCNC: 6.4 MG/DL (ref 2.8–7.4)
URATE SERPL-MCNC: 6.7 MG/DL (ref 3.4–7)
WBC # BLD AUTO: 0.44 10*3/MM3 (ref 3.4–10.8)
WBC # BLD AUTO: 0.57 10*3/MM3 (ref 3.4–10.8)
WBC # BLD AUTO: 0.59 10*3/MM3 (ref 3.4–10.8)
WBC # BLD AUTO: 0.86 10*3/MM3 (ref 3.4–10.8)
WBC # BLD AUTO: 1.09 10*3/MM3 (ref 3.4–10.8)
WBC # BLD AUTO: 1.17 10*3/MM3 (ref 3.4–10.8)
WBC # BLD AUTO: 10.32 10*3/MM3 (ref 3.4–10.8)
WBC # BLD AUTO: 11.26 10*3/MM3 (ref 3.4–10.8)
WBC # BLD AUTO: 13.4 10*3/MM3 (ref 3.4–10.8)
WBC # BLD AUTO: 13.51 10*3/MM3 (ref 3.4–10.8)
WBC # BLD AUTO: 17.11 10*3/MM3 (ref 3.4–10.8)
WBC # BLD AUTO: 24.31 10*3/MM3 (ref 3.4–10.8)
WBC # BLD AUTO: 3.45 10*3/MM3 (ref 3.4–10.8)
WBC # BLD AUTO: 30.42 10*3/MM3 (ref 3.4–10.8)
WBC # BLD AUTO: 4.35 10*3/MM3 (ref 3.4–10.8)
WBC # BLD AUTO: 4.4 10*3/MM3 (ref 3.4–10.8)
WBC # BLD AUTO: 4.67 10*3/MM3 (ref 3.4–10.8)
WBC # BLD AUTO: 4.76 10*3/MM3 (ref 3.4–10.8)
WBC # BLD AUTO: 4.77 10*3/MM3 (ref 3.4–10.8)
WBC # BLD AUTO: 4.92 10*3/MM3 (ref 3.4–10.8)
WBC # BLD AUTO: 4.98 10*3/MM3 (ref 3.4–10.8)
WBC # BLD AUTO: 5.02 10*3/MM3 (ref 3.4–10.8)
WBC # BLD AUTO: 5.31 10*3/MM3 (ref 3.4–10.8)
WBC # BLD AUTO: 5.39 10*3/MM3 (ref 3.4–10.8)
WBC # BLD AUTO: 5.59 10*3/MM3 (ref 3.4–10.8)
WBC # BLD AUTO: 5.71 10*3/MM3 (ref 3.4–10.8)
WBC # BLD AUTO: 6.4 10*3/MM3 (ref 3.4–10.8)
WBC # BLD AUTO: 7.04 10*3/MM3 (ref 3.4–10.8)
WBC # BLD AUTO: 7.8 10*3/MM3 (ref 3.4–10.8)
WBC # BLD AUTO: 8 10*3/MM3 (ref 3.4–10.8)
WBC # BLD AUTO: 8.59 10*3/MM3 (ref 3.4–10.8)
WBC # BLD AUTO: 9.19 10*3/MM3 (ref 3.4–10.8)

## 2021-01-01 PROCEDURE — 25010000002 DIPHENHYDRAMINE PER 50 MG: Performed by: INTERNAL MEDICINE

## 2021-01-01 PROCEDURE — 25010000002 VINCRISTINE PER 1 MG: Performed by: INTERNAL MEDICINE

## 2021-01-01 PROCEDURE — 88300 SURGICAL PATH GROSS: CPT | Performed by: INTERNAL MEDICINE

## 2021-01-01 PROCEDURE — 96411 CHEMO IV PUSH ADDL DRUG: CPT

## 2021-01-01 PROCEDURE — 80053 COMPREHEN METABOLIC PANEL: CPT | Performed by: INTERNAL MEDICINE

## 2021-01-01 PROCEDURE — 36415 COLL VENOUS BLD VENIPUNCTURE: CPT

## 2021-01-01 PROCEDURE — 86901 BLOOD TYPING SEROLOGIC RH(D): CPT

## 2021-01-01 PROCEDURE — 86923 COMPATIBILITY TEST ELECTRIC: CPT

## 2021-01-01 PROCEDURE — 85025 COMPLETE CBC W/AUTO DIFF WBC: CPT

## 2021-01-01 PROCEDURE — 80053 COMPREHEN METABOLIC PANEL: CPT

## 2021-01-01 PROCEDURE — 25010000002 DOXORUBICIN PER 10 MG: Performed by: INTERNAL MEDICINE

## 2021-01-01 PROCEDURE — 83605 ASSAY OF LACTIC ACID: CPT | Performed by: INTERNAL MEDICINE

## 2021-01-01 PROCEDURE — 85025 COMPLETE CBC W/AUTO DIFF WBC: CPT | Performed by: INTERNAL MEDICINE

## 2021-01-01 PROCEDURE — 0 LIDOCAINE 1 % SOLUTION: Performed by: RADIOLOGY

## 2021-01-01 PROCEDURE — 25010000002 FOSAPREPITANT PER 1 MG: Performed by: INTERNAL MEDICINE

## 2021-01-01 PROCEDURE — 96375 TX/PRO/DX INJ NEW DRUG ADDON: CPT

## 2021-01-01 PROCEDURE — 83735 ASSAY OF MAGNESIUM: CPT

## 2021-01-01 PROCEDURE — 83615 LACTATE (LD) (LDH) ENZYME: CPT

## 2021-01-01 PROCEDURE — 87070 CULTURE OTHR SPECIMN AEROBIC: CPT | Performed by: INTERNAL MEDICINE

## 2021-01-01 PROCEDURE — G0463 HOSPITAL OUTPT CLINIC VISIT: HCPCS

## 2021-01-01 PROCEDURE — 25010000002 HEPARIN LOCK FLUSH PER 10 UNITS: Performed by: INTERNAL MEDICINE

## 2021-01-01 PROCEDURE — C9803 HOPD COVID-19 SPEC COLLECT: HCPCS | Performed by: NURSE PRACTITIONER

## 2021-01-01 PROCEDURE — 63710000001 DIPHENHYDRAMINE PER 50 MG: Performed by: INTERNAL MEDICINE

## 2021-01-01 PROCEDURE — 96415 CHEMO IV INFUSION ADDL HR: CPT

## 2021-01-01 PROCEDURE — 96374 THER/PROPH/DIAG INJ IV PUSH: CPT

## 2021-01-01 PROCEDURE — 71046 X-RAY EXAM CHEST 2 VIEWS: CPT

## 2021-01-01 PROCEDURE — 25010000002 ALBUMIN HUMAN 25% PER 50 ML: Performed by: INTERNAL MEDICINE

## 2021-01-01 PROCEDURE — 25010000002 RITUXIMAB 10 MG/ML SOLUTION 50 ML VIAL: Performed by: INTERNAL MEDICINE

## 2021-01-01 PROCEDURE — 25010000002 DEXAMETHASONE SODIUM PHOSPHATE 100 MG/10ML SOLUTION: Performed by: INTERNAL MEDICINE

## 2021-01-01 PROCEDURE — 96413 CHEMO IV INFUSION 1 HR: CPT

## 2021-01-01 PROCEDURE — 25010000002 RITUXIMAB 10 MG/ML SOLUTION 50 ML VIAL: Performed by: NURSE PRACTITIONER

## 2021-01-01 PROCEDURE — 25010000002 DIPHENHYDRAMINE: Performed by: NURSE PRACTITIONER

## 2021-01-01 PROCEDURE — 25010000002 ONDANSETRON PER 1 MG: Performed by: INTERNAL MEDICINE

## 2021-01-01 PROCEDURE — 76942 ECHO GUIDE FOR BIOPSY: CPT

## 2021-01-01 PROCEDURE — 93005 ELECTROCARDIOGRAM TRACING: CPT | Performed by: INTERNAL MEDICINE

## 2021-01-01 PROCEDURE — 96417 CHEMO IV INFUS EACH ADDL SEQ: CPT

## 2021-01-01 PROCEDURE — 25010000002 HYDROCORTISONE SODIUM SUCCINATE 100 MG RECONSTITUTED SOLUTION: Performed by: INTERNAL MEDICINE

## 2021-01-01 PROCEDURE — 83615 LACTATE (LD) (LDH) ENZYME: CPT | Performed by: INTERNAL MEDICINE

## 2021-01-01 PROCEDURE — 99214 OFFICE O/P EST MOD 30 MIN: CPT | Performed by: NURSE PRACTITIONER

## 2021-01-01 PROCEDURE — 25010000002 PEGFILGRASTIM 6 MG/0.6ML PREFILLED SYRINGE KIT: Performed by: NURSE PRACTITIONER

## 2021-01-01 PROCEDURE — 96361 HYDRATE IV INFUSION ADD-ON: CPT

## 2021-01-01 PROCEDURE — 86900 BLOOD TYPING SEROLOGIC ABO: CPT

## 2021-01-01 PROCEDURE — G0378 HOSPITAL OBSERVATION PER HR: HCPCS

## 2021-01-01 PROCEDURE — 25010000002 DIPHENHYDRAMINE: Performed by: INTERNAL MEDICINE

## 2021-01-01 PROCEDURE — 84550 ASSAY OF BLOOD/URIC ACID: CPT

## 2021-01-01 PROCEDURE — 99215 OFFICE O/P EST HI 40 MIN: CPT | Performed by: INTERNAL MEDICINE

## 2021-01-01 PROCEDURE — 0 IOPAMIDOL PER 1 ML: Performed by: NURSE PRACTITIONER

## 2021-01-01 PROCEDURE — 85610 PROTHROMBIN TIME: CPT | Performed by: INTERNAL MEDICINE

## 2021-01-01 PROCEDURE — 84443 ASSAY THYROID STIM HORMONE: CPT | Performed by: INTERNAL MEDICINE

## 2021-01-01 PROCEDURE — 25010000002 ZOLEDRONIC ACID 4 MG/100ML SOLUTION: Performed by: INTERNAL MEDICINE

## 2021-01-01 PROCEDURE — 88342 IMHCHEM/IMCYTCHM 1ST ANTB: CPT | Performed by: INTERNAL MEDICINE

## 2021-01-01 PROCEDURE — 25010000002 MIDAZOLAM PER 1 MG: Performed by: RADIOLOGY

## 2021-01-01 PROCEDURE — 85025 COMPLETE CBC W/AUTO DIFF WBC: CPT | Performed by: NURSE PRACTITIONER

## 2021-01-01 PROCEDURE — 25010000002 DEXAMETHASONE SODIUM PHOSPHATE 100 MG/10ML SOLUTION: Performed by: NURSE PRACTITIONER

## 2021-01-01 PROCEDURE — 25010000002 FUROSEMIDE PER 20 MG: Performed by: NURSE PRACTITIONER

## 2021-01-01 PROCEDURE — 25010000002 PALONOSETRON PER 25 MCG: Performed by: INTERNAL MEDICINE

## 2021-01-01 PROCEDURE — 82962 GLUCOSE BLOOD TEST: CPT

## 2021-01-01 PROCEDURE — P9016 RBC LEUKOCYTES REDUCED: HCPCS

## 2021-01-01 PROCEDURE — 36591 DRAW BLOOD OFF VENOUS DEVICE: CPT

## 2021-01-01 PROCEDURE — 84484 ASSAY OF TROPONIN QUANT: CPT | Performed by: INTERNAL MEDICINE

## 2021-01-01 PROCEDURE — 99214 OFFICE O/P EST MOD 30 MIN: CPT | Performed by: INTERNAL MEDICINE

## 2021-01-01 PROCEDURE — 96377 APPLICATON ON-BODY INJECTOR: CPT

## 2021-01-01 PROCEDURE — 77012 CT SCAN FOR NEEDLE BIOPSY: CPT

## 2021-01-01 PROCEDURE — 25010000002 VINCRISTINE PER 1 MG: Performed by: NURSE PRACTITIONER

## 2021-01-01 PROCEDURE — 71275 CT ANGIOGRAPHY CHEST: CPT

## 2021-01-01 PROCEDURE — 87040 BLOOD CULTURE FOR BACTERIA: CPT | Performed by: INTERNAL MEDICINE

## 2021-01-01 PROCEDURE — 89051 BODY FLUID CELL COUNT: CPT | Performed by: INTERNAL MEDICINE

## 2021-01-01 PROCEDURE — 82042 OTHER SOURCE ALBUMIN QUAN EA: CPT | Performed by: INTERNAL MEDICINE

## 2021-01-01 PROCEDURE — 86706 HEP B SURFACE ANTIBODY: CPT | Performed by: INTERNAL MEDICINE

## 2021-01-01 PROCEDURE — 84100 ASSAY OF PHOSPHORUS: CPT

## 2021-01-01 PROCEDURE — 93010 ELECTROCARDIOGRAM REPORT: CPT | Performed by: INTERNAL MEDICINE

## 2021-01-01 PROCEDURE — 99205 OFFICE O/P NEW HI 60 MIN: CPT | Performed by: INTERNAL MEDICINE

## 2021-01-01 PROCEDURE — 25010000002 PIPERACILLIN SOD-TAZOBACTAM PER 1 G: Performed by: INTERNAL MEDICINE

## 2021-01-01 PROCEDURE — 88305 TISSUE EXAM BY PATHOLOGIST: CPT | Performed by: INTERNAL MEDICINE

## 2021-01-01 PROCEDURE — 25010000002 POLATUZUMAB VEDOTIN-PIIQ 140 MG RECONSTITUTED SOLUTION 1 EACH VIAL: Performed by: NURSE PRACTITIONER

## 2021-01-01 PROCEDURE — 63710000001 ONDANSETRON ODT 4 MG TABLET DISPERSIBLE: Performed by: RADIOLOGY

## 2021-01-01 PROCEDURE — P9047 ALBUMIN (HUMAN), 25%, 50ML: HCPCS | Performed by: INTERNAL MEDICINE

## 2021-01-01 PROCEDURE — 93325 DOPPLER ECHO COLOR FLOW MAPG: CPT

## 2021-01-01 PROCEDURE — 96367 TX/PROPH/DG ADDL SEQ IV INF: CPT

## 2021-01-01 PROCEDURE — 25010000002 ONDANSETRON PER 1 MG: Performed by: NURSE PRACTITIONER

## 2021-01-01 PROCEDURE — 88341 IMHCHEM/IMCYTCHM EA ADD ANTB: CPT | Performed by: INTERNAL MEDICINE

## 2021-01-01 PROCEDURE — 78815 PET IMAGE W/CT SKULL-THIGH: CPT

## 2021-01-01 PROCEDURE — 93308 TTE F-UP OR LMTD: CPT | Performed by: INTERNAL MEDICINE

## 2021-01-01 PROCEDURE — 84145 PROCALCITONIN (PCT): CPT | Performed by: INTERNAL MEDICINE

## 2021-01-01 PROCEDURE — 25010000002 PERFLUTREN (DEFINITY) 8.476 MG IN SODIUM CHLORIDE (PF) 0.9 % 10 ML INJECTION: Performed by: INTERNAL MEDICINE

## 2021-01-01 PROCEDURE — 25010000002 RITUXIMAB 10 MG/ML SOLUTION 10 ML VIAL: Performed by: INTERNAL MEDICINE

## 2021-01-01 PROCEDURE — 96523 IRRIG DRUG DELIVERY DEVICE: CPT

## 2021-01-01 PROCEDURE — 25010000002 CYCLOPHOSPHAMIDE 1 GM/5ML SOLUTION 5 ML VIAL: Performed by: NURSE PRACTITIONER

## 2021-01-01 PROCEDURE — 99417 PROLNG OP E/M EACH 15 MIN: CPT | Performed by: NURSE PRACTITIONER

## 2021-01-01 PROCEDURE — 84100 ASSAY OF PHOSPHORUS: CPT | Performed by: INTERNAL MEDICINE

## 2021-01-01 PROCEDURE — 88360 TUMOR IMMUNOHISTOCHEM/MANUAL: CPT | Performed by: INTERNAL MEDICINE

## 2021-01-01 PROCEDURE — A9552 F18 FDG: HCPCS | Performed by: INTERNAL MEDICINE

## 2021-01-01 PROCEDURE — U0004 COV-19 TEST NON-CDC HGH THRU: HCPCS

## 2021-01-01 PROCEDURE — 87205 SMEAR GRAM STAIN: CPT | Performed by: INTERNAL MEDICINE

## 2021-01-01 PROCEDURE — 25010000002 BENDAMUSTINE HCL 100 MG/4ML SOLUTION 4 ML VIAL: Performed by: INTERNAL MEDICINE

## 2021-01-01 PROCEDURE — 80053 COMPREHEN METABOLIC PANEL: CPT | Performed by: NURSE PRACTITIONER

## 2021-01-01 PROCEDURE — 0 FLUDEOXYGLUCOSE F18 SOLUTION: Performed by: NURSE PRACTITIONER

## 2021-01-01 PROCEDURE — 83735 ASSAY OF MAGNESIUM: CPT | Performed by: INTERNAL MEDICINE

## 2021-01-01 PROCEDURE — 82533 TOTAL CORTISOL: CPT | Performed by: INTERNAL MEDICINE

## 2021-01-01 PROCEDURE — 25010000002 PALONOSETRON PER 25 MCG: Performed by: NURSE PRACTITIONER

## 2021-01-01 PROCEDURE — 84157 ASSAY OF PROTEIN OTHER: CPT | Performed by: INTERNAL MEDICINE

## 2021-01-01 PROCEDURE — 86850 RBC ANTIBODY SCREEN: CPT

## 2021-01-01 PROCEDURE — 88112 CYTOPATH CELL ENHANCE TECH: CPT | Performed by: INTERNAL MEDICINE

## 2021-01-01 PROCEDURE — 25010000003 LIDOCAINE 1 % SOLUTION: Performed by: RADIOLOGY

## 2021-01-01 PROCEDURE — 87635 SARS-COV-2 COVID-19 AMP PRB: CPT | Performed by: NURSE PRACTITIONER

## 2021-01-01 PROCEDURE — 99215 OFFICE O/P EST HI 40 MIN: CPT | Performed by: NURSE PRACTITIONER

## 2021-01-01 PROCEDURE — 84550 ASSAY OF BLOOD/URIC ACID: CPT | Performed by: INTERNAL MEDICINE

## 2021-01-01 PROCEDURE — 36430 TRANSFUSION BLD/BLD COMPNT: CPT

## 2021-01-01 PROCEDURE — 93321 DOPPLER ECHO F-UP/LMTD STD: CPT

## 2021-01-01 PROCEDURE — 0 FLUDEOXYGLUCOSE F18 SOLUTION: Performed by: INTERNAL MEDICINE

## 2021-01-01 PROCEDURE — 96360 HYDRATION IV INFUSION INIT: CPT

## 2021-01-01 PROCEDURE — 93321 DOPPLER ECHO F-UP/LMTD STD: CPT | Performed by: INTERNAL MEDICINE

## 2021-01-01 PROCEDURE — 25010000002 CYCLOPHOSPHAMIDE PER 100 MG: Performed by: INTERNAL MEDICINE

## 2021-01-01 PROCEDURE — 25010000002 FOSAPREPITANT PER 1 MG: Performed by: NURSE PRACTITIONER

## 2021-01-01 PROCEDURE — U0004 COV-19 TEST NON-CDC HGH THRU: HCPCS | Performed by: INTERNAL MEDICINE

## 2021-01-01 PROCEDURE — 25010000002 POLATUZUMAB VEDOTIN-PIIQ 30 MG RECONSTITUTED SOLUTION 1 EACH VIAL: Performed by: NURSE PRACTITIONER

## 2021-01-01 PROCEDURE — 83880 ASSAY OF NATRIURETIC PEPTIDE: CPT | Performed by: INTERNAL MEDICINE

## 2021-01-01 PROCEDURE — 25010000002 PEGFILGRASTIM 6 MG/0.6ML PREFILLED SYRINGE KIT: Performed by: INTERNAL MEDICINE

## 2021-01-01 PROCEDURE — 93325 DOPPLER ECHO COLOR FLOW MAPG: CPT | Performed by: INTERNAL MEDICINE

## 2021-01-01 PROCEDURE — 85610 PROTHROMBIN TIME: CPT

## 2021-01-01 PROCEDURE — C9803 HOPD COVID-19 SPEC COLLECT: HCPCS

## 2021-01-01 PROCEDURE — 74177 CT ABD & PELVIS W/CONTRAST: CPT

## 2021-01-01 PROCEDURE — 83880 ASSAY OF NATRIURETIC PEPTIDE: CPT | Performed by: NURSE PRACTITIONER

## 2021-01-01 PROCEDURE — 87340 HEPATITIS B SURFACE AG IA: CPT | Performed by: INTERNAL MEDICINE

## 2021-01-01 PROCEDURE — 93308 TTE F-UP OR LMTD: CPT

## 2021-01-01 PROCEDURE — 25010000002 DOXORUBICIN PER 10 MG: Performed by: NURSE PRACTITIONER

## 2021-01-01 PROCEDURE — 25010000002 PROCHLORPERAZINE 10 MG/2ML SOLUTION: Performed by: INTERNAL MEDICINE

## 2021-01-01 PROCEDURE — A9552 F18 FDG: HCPCS | Performed by: NURSE PRACTITIONER

## 2021-01-01 PROCEDURE — 25010000002 HEPARIN LOCK FLUSH PER 10 UNITS: Performed by: NURSE PRACTITIONER

## 2021-01-01 PROCEDURE — 93000 ELECTROCARDIOGRAM COMPLETE: CPT | Performed by: INTERNAL MEDICINE

## 2021-01-01 PROCEDURE — 25010000003 HEPARIN LOCK FLUSH PER 10 UNITS: Performed by: INTERNAL MEDICINE

## 2021-01-01 PROCEDURE — 25010000002 FENTANYL CITRATE (PF) 50 MCG/ML SOLUTION: Performed by: RADIOLOGY

## 2021-01-01 PROCEDURE — 87015 SPECIMEN INFECT AGNT CONCNTJ: CPT | Performed by: INTERNAL MEDICINE

## 2021-01-01 RX ORDER — ALLOPURINOL 100 MG/1
TABLET ORAL
Qty: 90 TABLET | Refills: 1 | Status: SHIPPED | OUTPATIENT
Start: 2021-01-01 | End: 2021-01-01

## 2021-01-01 RX ORDER — MEPERIDINE HYDROCHLORIDE 25 MG/ML
25 INJECTION INTRAMUSCULAR; INTRAVENOUS; SUBCUTANEOUS
Status: CANCELLED | OUTPATIENT
Start: 2021-01-01

## 2021-01-01 RX ORDER — FAMOTIDINE 10 MG/ML
20 INJECTION, SOLUTION INTRAVENOUS ONCE
Status: COMPLETED | OUTPATIENT
Start: 2021-01-01 | End: 2021-01-01

## 2021-01-01 RX ORDER — ACETAMINOPHEN 325 MG/1
650 TABLET ORAL ONCE
Status: COMPLETED | OUTPATIENT
Start: 2021-01-01 | End: 2021-01-01

## 2021-01-01 RX ORDER — SODIUM CHLORIDE 0.9 % (FLUSH) 0.9 %
10 SYRINGE (ML) INJECTION AS NEEDED
Status: DISCONTINUED | OUTPATIENT
Start: 2021-01-01 | End: 2021-01-01 | Stop reason: HOSPADM

## 2021-01-01 RX ORDER — SODIUM CHLORIDE 9 MG/ML
250 INJECTION, SOLUTION INTRAVENOUS ONCE
Status: DISCONTINUED | OUTPATIENT
Start: 2021-01-01 | End: 2021-01-01 | Stop reason: HOSPADM

## 2021-01-01 RX ORDER — HEPARIN SODIUM (PORCINE) LOCK FLUSH IV SOLN 100 UNIT/ML 100 UNIT/ML
500 SOLUTION INTRAVENOUS AS NEEDED
Status: CANCELLED | OUTPATIENT
Start: 2021-01-01

## 2021-01-01 RX ORDER — HYDROMORPHONE HYDROCHLORIDE 1 MG/ML
0.5 INJECTION, SOLUTION INTRAMUSCULAR; INTRAVENOUS; SUBCUTANEOUS
Status: DISCONTINUED | OUTPATIENT
Start: 2021-01-01 | End: 2021-01-01 | Stop reason: HOSPADM

## 2021-01-01 RX ORDER — SODIUM CHLORIDE 9 MG/ML
500 INJECTION, SOLUTION INTRAVENOUS ONCE
Status: CANCELLED
Start: 2021-01-01 | End: 2021-01-01

## 2021-01-01 RX ORDER — SODIUM CHLORIDE 0.9 % (FLUSH) 0.9 %
3 SYRINGE (ML) INJECTION EVERY 12 HOURS SCHEDULED
Status: DISCONTINUED | OUTPATIENT
Start: 2021-01-01 | End: 2021-01-01 | Stop reason: HOSPADM

## 2021-01-01 RX ORDER — ACETAMINOPHEN 160 MG/5ML
650 SOLUTION ORAL EVERY 4 HOURS PRN
Status: DISCONTINUED | OUTPATIENT
Start: 2021-01-01 | End: 2021-01-01 | Stop reason: HOSPADM

## 2021-01-01 RX ORDER — SODIUM CHLORIDE 9 MG/ML
25 INJECTION, SOLUTION INTRAVENOUS ONCE
Status: DISCONTINUED | OUTPATIENT
Start: 2021-01-01 | End: 2021-01-01 | Stop reason: HOSPADM

## 2021-01-01 RX ORDER — SODIUM CHLORIDE 9 MG/ML
250 INJECTION, SOLUTION INTRAVENOUS ONCE
Status: CANCELLED | OUTPATIENT
Start: 2021-01-01

## 2021-01-01 RX ORDER — LORAZEPAM 0.5 MG/1
0.5 TABLET ORAL EVERY 6 HOURS PRN
COMMUNITY

## 2021-01-01 RX ORDER — NALOXONE HCL 0.4 MG/ML
0.4 VIAL (ML) INJECTION
Status: DISCONTINUED | OUTPATIENT
Start: 2021-01-01 | End: 2021-01-01 | Stop reason: HOSPADM

## 2021-01-01 RX ORDER — PROCHLORPERAZINE EDISYLATE 5 MG/ML
10 INJECTION INTRAMUSCULAR; INTRAVENOUS ONCE
Status: COMPLETED | OUTPATIENT
Start: 2021-01-01 | End: 2021-01-01

## 2021-01-01 RX ORDER — SODIUM CHLORIDE 0.9 % (FLUSH) 0.9 %
10 SYRINGE (ML) INJECTION AS NEEDED
Status: CANCELLED | OUTPATIENT
Start: 2021-01-01

## 2021-01-01 RX ORDER — ACETAMINOPHEN 325 MG/1
650 TABLET ORAL ONCE
Status: CANCELLED | OUTPATIENT
Start: 2021-01-01

## 2021-01-01 RX ORDER — ALLOPURINOL 300 MG/1
300 TABLET ORAL DAILY
Status: DISCONTINUED | OUTPATIENT
Start: 2021-01-01 | End: 2021-01-01 | Stop reason: HOSPADM

## 2021-01-01 RX ORDER — HYDROCODONE BITARTRATE AND ACETAMINOPHEN 5; 325 MG/1; MG/1
1 TABLET ORAL EVERY 4 HOURS PRN
Status: DISCONTINUED | OUTPATIENT
Start: 2021-01-01 | End: 2021-01-01 | Stop reason: HOSPADM

## 2021-01-01 RX ORDER — SODIUM CHLORIDE 9 MG/ML
500 INJECTION, SOLUTION INTRAVENOUS ONCE
Status: COMPLETED | OUTPATIENT
Start: 2021-01-01 | End: 2021-01-01

## 2021-01-01 RX ORDER — FAMOTIDINE 10 MG/ML
20 INJECTION, SOLUTION INTRAVENOUS ONCE
Status: CANCELLED | OUTPATIENT
Start: 2021-01-01

## 2021-01-01 RX ORDER — PALONOSETRON 0.05 MG/ML
0.25 INJECTION, SOLUTION INTRAVENOUS ONCE
Status: COMPLETED | OUTPATIENT
Start: 2021-01-01 | End: 2021-01-01

## 2021-01-01 RX ORDER — AMLODIPINE BESYLATE 2.5 MG/1
2.5 TABLET ORAL DAILY
Qty: 30 TABLET | Refills: 2 | Status: SHIPPED | OUTPATIENT
Start: 2021-01-01 | End: 2021-01-01

## 2021-01-01 RX ORDER — DOXORUBICIN HYDROCHLORIDE 2 MG/ML
50 INJECTION, SOLUTION INTRAVENOUS ONCE
Status: CANCELLED | OUTPATIENT
Start: 2021-01-01

## 2021-01-01 RX ORDER — SULFAMETHOXAZOLE AND TRIMETHOPRIM 800; 160 MG/1; MG/1
TABLET ORAL
COMMUNITY
Start: 2021-01-01 | End: 2021-01-01

## 2021-01-01 RX ORDER — SODIUM CHLORIDE 0.9 % (FLUSH) 0.9 %
10 SYRINGE (ML) INJECTION EVERY 12 HOURS SCHEDULED
Status: DISCONTINUED | OUTPATIENT
Start: 2021-01-01 | End: 2021-01-01 | Stop reason: HOSPADM

## 2021-01-01 RX ORDER — BISACODYL 5 MG/1
5 TABLET, DELAYED RELEASE ORAL DAILY PRN
Status: DISCONTINUED | OUTPATIENT
Start: 2021-01-01 | End: 2021-01-01 | Stop reason: HOSPADM

## 2021-01-01 RX ORDER — HEPARIN SODIUM (PORCINE) LOCK FLUSH IV SOLN 100 UNIT/ML 100 UNIT/ML
5 SOLUTION INTRAVENOUS AS NEEDED
Status: DISCONTINUED | OUTPATIENT
Start: 2021-01-01 | End: 2021-01-01 | Stop reason: HOSPADM

## 2021-01-01 RX ORDER — ALPRAZOLAM 0.25 MG/1
0.12 TABLET ORAL EVERY 8 HOURS PRN
Status: DISCONTINUED | OUTPATIENT
Start: 2021-01-01 | End: 2021-01-01 | Stop reason: HOSPADM

## 2021-01-01 RX ORDER — LEVOFLOXACIN 500 MG/1
500 TABLET, FILM COATED ORAL DAILY
Qty: 7 TABLET | Refills: 0 | Status: CANCELLED | OUTPATIENT
Start: 2021-01-01

## 2021-01-01 RX ORDER — ONDANSETRON 4 MG/1
4 TABLET, ORALLY DISINTEGRATING ORAL ONCE
Status: COMPLETED | OUTPATIENT
Start: 2021-01-01 | End: 2021-01-01

## 2021-01-01 RX ORDER — PROMETHAZINE HYDROCHLORIDE 25 MG/1
25 SUPPOSITORY RECTAL EVERY 8 HOURS PRN
Qty: 10 SUPPOSITORY | Refills: 0 | Status: SHIPPED | OUTPATIENT
Start: 2021-01-01

## 2021-01-01 RX ORDER — DOXORUBICIN HYDROCHLORIDE 2 MG/ML
50 INJECTION, SOLUTION INTRAVENOUS ONCE
Status: COMPLETED | OUTPATIENT
Start: 2021-01-01 | End: 2021-01-01

## 2021-01-01 RX ORDER — LIDOCAINE HYDROCHLORIDE 10 MG/ML
10 INJECTION, SOLUTION INFILTRATION; PERINEURAL ONCE
Status: COMPLETED | OUTPATIENT
Start: 2021-01-01 | End: 2021-01-01

## 2021-01-01 RX ORDER — PALONOSETRON 0.05 MG/ML
0.25 INJECTION, SOLUTION INTRAVENOUS ONCE
Status: CANCELLED | OUTPATIENT
Start: 2021-01-01

## 2021-01-01 RX ORDER — DIPHENHYDRAMINE HCL 25 MG
25 CAPSULE ORAL ONCE
Status: CANCELLED | OUTPATIENT
Start: 2021-01-01 | End: 2021-01-01

## 2021-01-01 RX ORDER — IPRATROPIUM BROMIDE AND ALBUTEROL SULFATE 2.5; .5 MG/3ML; MG/3ML
3 SOLUTION RESPIRATORY (INHALATION) EVERY 4 HOURS PRN
Status: DISCONTINUED | OUTPATIENT
Start: 2021-01-01 | End: 2021-01-01 | Stop reason: HOSPADM

## 2021-01-01 RX ORDER — MULTIPLE VITAMINS W/ MINERALS TAB 9MG-400MCG
1 TAB ORAL DAILY
COMMUNITY
End: 2021-01-01

## 2021-01-01 RX ORDER — ONDANSETRON 2 MG/ML
4 INJECTION INTRAMUSCULAR; INTRAVENOUS EVERY 6 HOURS PRN
Status: DISCONTINUED | OUTPATIENT
Start: 2021-01-01 | End: 2021-01-01 | Stop reason: HOSPADM

## 2021-01-01 RX ORDER — SODIUM CHLORIDE 9 MG/ML
750 INJECTION, SOLUTION INTRAVENOUS ONCE
Status: COMPLETED | OUTPATIENT
Start: 2021-01-01 | End: 2021-01-01

## 2021-01-01 RX ORDER — VALACYCLOVIR HYDROCHLORIDE 500 MG/1
500 TABLET, FILM COATED ORAL 2 TIMES DAILY
COMMUNITY
End: 2021-01-01 | Stop reason: SDUPTHER

## 2021-01-01 RX ORDER — ONDANSETRON HYDROCHLORIDE 8 MG/1
TABLET, FILM COATED ORAL
COMMUNITY
Start: 2021-01-01

## 2021-01-01 RX ORDER — PANTOPRAZOLE SODIUM 40 MG/1
40 TABLET, DELAYED RELEASE ORAL EVERY MORNING
Status: DISCONTINUED | OUTPATIENT
Start: 2021-01-01 | End: 2021-01-01 | Stop reason: HOSPADM

## 2021-01-01 RX ORDER — SODIUM CHLORIDE 9 MG/ML
250 INJECTION, SOLUTION INTRAVENOUS AS NEEDED
Status: DISCONTINUED | OUTPATIENT
Start: 2021-01-01 | End: 2021-01-01 | Stop reason: HOSPADM

## 2021-01-01 RX ORDER — DIPHENHYDRAMINE HCL 25 MG
25 CAPSULE ORAL ONCE
Status: COMPLETED | OUTPATIENT
Start: 2021-01-01 | End: 2021-01-01

## 2021-01-01 RX ORDER — SODIUM CHLORIDE 9 MG/ML
250 INJECTION, SOLUTION INTRAVENOUS ONCE
Status: COMPLETED | OUTPATIENT
Start: 2021-01-01 | End: 2021-01-01

## 2021-01-01 RX ORDER — DIPHENHYDRAMINE HYDROCHLORIDE 50 MG/ML
50 INJECTION INTRAMUSCULAR; INTRAVENOUS AS NEEDED
Status: CANCELLED | OUTPATIENT
Start: 2021-01-01

## 2021-01-01 RX ORDER — ZOLEDRONIC ACID 0.04 MG/ML
4 INJECTION, SOLUTION INTRAVENOUS ONCE
Status: COMPLETED | OUTPATIENT
Start: 2021-01-01 | End: 2021-01-01

## 2021-01-01 RX ORDER — LANSOPRAZOLE 30 MG/1
30 TABLET, ORALLY DISINTEGRATING, DELAYED RELEASE ORAL
Qty: 60 TABLET | Refills: 2 | Status: SHIPPED | OUTPATIENT
Start: 2021-01-01

## 2021-01-01 RX ORDER — LIDOCAINE HYDROCHLORIDE 10 MG/ML
20 INJECTION, SOLUTION INFILTRATION; PERINEURAL ONCE
Status: COMPLETED | OUTPATIENT
Start: 2021-01-01 | End: 2021-01-01

## 2021-01-01 RX ORDER — BISACODYL 10 MG
10 SUPPOSITORY, RECTAL RECTAL DAILY PRN
Status: DISCONTINUED | OUTPATIENT
Start: 2021-01-01 | End: 2021-01-01 | Stop reason: HOSPADM

## 2021-01-01 RX ORDER — VALACYCLOVIR HYDROCHLORIDE 500 MG/1
500 TABLET, FILM COATED ORAL 2 TIMES DAILY
Qty: 60 TABLET | Refills: 3 | Status: SHIPPED | OUTPATIENT
Start: 2021-01-01 | End: 2021-01-01

## 2021-01-01 RX ORDER — ALLOPURINOL 300 MG/1
TABLET ORAL
Qty: 90 TABLET | Refills: 3 | Status: SHIPPED | OUTPATIENT
Start: 2021-01-01 | End: 2021-01-01

## 2021-01-01 RX ORDER — CETIRIZINE HYDROCHLORIDE 10 MG/1
10 TABLET ORAL DAILY
Status: DISCONTINUED | OUTPATIENT
Start: 2021-01-01 | End: 2021-01-01 | Stop reason: HOSPADM

## 2021-01-01 RX ORDER — HEPARIN SODIUM (PORCINE) LOCK FLUSH IV SOLN 100 UNIT/ML 100 UNIT/ML
500 SOLUTION INTRAVENOUS AS NEEDED
Status: DISCONTINUED | OUTPATIENT
Start: 2021-01-01 | End: 2021-01-01 | Stop reason: HOSPADM

## 2021-01-01 RX ORDER — AMOXICILLIN 250 MG
2 CAPSULE ORAL 2 TIMES DAILY
Status: DISCONTINUED | OUTPATIENT
Start: 2021-01-01 | End: 2021-01-01 | Stop reason: HOSPADM

## 2021-01-01 RX ORDER — FAMOTIDINE 10 MG/ML
20 INJECTION, SOLUTION INTRAVENOUS AS NEEDED
Status: CANCELLED | OUTPATIENT
Start: 2021-01-01

## 2021-01-01 RX ORDER — ACETAMINOPHEN 650 MG/1
650 SUPPOSITORY RECTAL EVERY 4 HOURS PRN
Status: DISCONTINUED | OUTPATIENT
Start: 2021-01-01 | End: 2021-01-01 | Stop reason: HOSPADM

## 2021-01-01 RX ORDER — ACETAMINOPHEN 325 MG/1
650 TABLET ORAL ONCE
Status: CANCELLED | OUTPATIENT
Start: 2021-01-01 | End: 2021-01-01

## 2021-01-01 RX ORDER — LEVOFLOXACIN 500 MG/1
500 TABLET, FILM COATED ORAL DAILY
Qty: 7 TABLET | Refills: 0 | Status: SHIPPED | OUTPATIENT
Start: 2021-01-01 | End: 2021-01-01

## 2021-01-01 RX ORDER — ALBUMIN (HUMAN) 12.5 G/50ML
25 SOLUTION INTRAVENOUS ONCE
Status: DISCONTINUED | OUTPATIENT
Start: 2021-01-01 | End: 2021-01-01

## 2021-01-01 RX ORDER — FUROSEMIDE 20 MG/1
20 TABLET ORAL DAILY
Qty: 10 TABLET | Refills: 0 | Status: SHIPPED | OUTPATIENT
Start: 2021-01-01 | End: 2021-01-01

## 2021-01-01 RX ORDER — DIPHENHYDRAMINE HCL 25 MG
25 CAPSULE ORAL ONCE
Status: CANCELLED
Start: 2021-01-01 | End: 2021-01-01

## 2021-01-01 RX ORDER — FUROSEMIDE 10 MG/ML
20 INJECTION INTRAMUSCULAR; INTRAVENOUS ONCE
Status: COMPLETED | OUTPATIENT
Start: 2021-01-01 | End: 2021-01-01

## 2021-01-01 RX ORDER — VALACYCLOVIR HYDROCHLORIDE 500 MG/1
500 TABLET, FILM COATED ORAL 2 TIMES DAILY
Qty: 60 TABLET | Refills: 3 | Status: SHIPPED | OUTPATIENT
Start: 2021-01-01 | End: 2021-01-01 | Stop reason: SDUPTHER

## 2021-01-01 RX ORDER — ACETAMINOPHEN 325 MG/1
650 TABLET ORAL EVERY 4 HOURS PRN
Status: DISCONTINUED | OUTPATIENT
Start: 2021-01-01 | End: 2021-01-01 | Stop reason: HOSPADM

## 2021-01-01 RX ORDER — ONDANSETRON 4 MG/1
4 TABLET, FILM COATED ORAL EVERY 6 HOURS PRN
Status: DISCONTINUED | OUTPATIENT
Start: 2021-01-01 | End: 2021-01-01 | Stop reason: HOSPADM

## 2021-01-01 RX ORDER — SODIUM CHLORIDE 9 MG/ML
250 INJECTION, SOLUTION INTRAVENOUS AS NEEDED
Status: CANCELLED | OUTPATIENT
Start: 2021-01-01

## 2021-01-01 RX ORDER — SODIUM CHLORIDE 0.9 % (FLUSH) 0.9 %
20 SYRINGE (ML) INJECTION AS NEEDED
Status: DISCONTINUED | OUTPATIENT
Start: 2021-01-01 | End: 2021-01-01 | Stop reason: HOSPADM

## 2021-01-01 RX ORDER — FENTANYL CITRATE 50 UG/ML
INJECTION, SOLUTION INTRAMUSCULAR; INTRAVENOUS
Status: COMPLETED | OUTPATIENT
Start: 2021-01-01 | End: 2021-01-01

## 2021-01-01 RX ORDER — ALLOPURINOL 300 MG/1
300 TABLET ORAL DAILY
COMMUNITY
Start: 2021-01-01 | End: 2021-01-01

## 2021-01-01 RX ORDER — POLYETHYLENE GLYCOL 3350 17 G/17G
17 POWDER, FOR SOLUTION ORAL DAILY PRN
Status: DISCONTINUED | OUTPATIENT
Start: 2021-01-01 | End: 2021-01-01 | Stop reason: HOSPADM

## 2021-01-01 RX ORDER — ALLOPURINOL 300 MG/1
300 TABLET ORAL DAILY
COMMUNITY
End: 2021-01-01

## 2021-01-01 RX ORDER — METOPROLOL SUCCINATE 50 MG/1
TABLET, EXTENDED RELEASE ORAL
Qty: 90 TABLET | Refills: 1 | Status: SHIPPED | OUTPATIENT
Start: 2021-01-01 | End: 2021-01-01

## 2021-01-01 RX ORDER — OLMESARTAN MEDOXOMIL, AMLODIPINE AND HYDROCHLOROTHIAZIDE TABLET 40/5/25 MG 40; 5; 25 MG/1; MG/1; MG/1
TABLET ORAL
Qty: 90 TABLET | Refills: 1 | OUTPATIENT
Start: 2021-01-01

## 2021-01-01 RX ORDER — VALACYCLOVIR HYDROCHLORIDE 500 MG/1
500 TABLET, FILM COATED ORAL EVERY 12 HOURS SCHEDULED
Status: DISCONTINUED | OUTPATIENT
Start: 2021-01-01 | End: 2021-01-01 | Stop reason: HOSPADM

## 2021-01-01 RX ORDER — LORATADINE 10 MG/1
10 TABLET ORAL DAILY
COMMUNITY
Start: 2021-01-01 | End: 2021-01-01

## 2021-01-01 RX ORDER — ALPRAZOLAM 0.25 MG/1
0.25 TABLET ORAL 3 TIMES DAILY PRN
Qty: 9 TABLET | Refills: 0 | Status: SHIPPED | OUTPATIENT
Start: 2021-01-01

## 2021-01-01 RX ORDER — AMLODIPINE BESYLATE 2.5 MG/1
TABLET ORAL
Qty: 90 TABLET | Refills: 1 | Status: SHIPPED | OUTPATIENT
Start: 2021-01-01 | End: 2021-01-01

## 2021-01-01 RX ORDER — ONDANSETRON 2 MG/ML
8 INJECTION INTRAMUSCULAR; INTRAVENOUS ONCE
Status: DISCONTINUED | OUTPATIENT
Start: 2021-01-01 | End: 2021-01-01 | Stop reason: SDUPTHER

## 2021-01-01 RX ORDER — POTASSIUM CHLORIDE 20 MEQ/1
20 TABLET, EXTENDED RELEASE ORAL ONCE
Status: COMPLETED | OUTPATIENT
Start: 2021-01-01 | End: 2021-01-01

## 2021-01-01 RX ORDER — PANTOPRAZOLE SODIUM 40 MG/1
TABLET, DELAYED RELEASE ORAL
Qty: 30 TABLET | Refills: 1 | Status: SHIPPED | OUTPATIENT
Start: 2021-01-01

## 2021-01-01 RX ORDER — ALLOPURINOL 300 MG/1
300 TABLET ORAL DAILY
Status: DISCONTINUED | OUTPATIENT
Start: 2021-01-01 | End: 2021-01-01

## 2021-01-01 RX ORDER — LORATADINE 10 MG/1
10 TABLET ORAL DAILY
COMMUNITY
End: 2021-01-01

## 2021-01-01 RX ORDER — FAMOTIDINE 10 MG/ML
40 INJECTION, SOLUTION INTRAVENOUS ONCE
Status: COMPLETED | OUTPATIENT
Start: 2021-01-01 | End: 2021-01-01

## 2021-01-01 RX ORDER — PANTOPRAZOLE SODIUM 40 MG/1
40 TABLET, DELAYED RELEASE ORAL DAILY
Qty: 30 TABLET | Refills: 1 | Status: SHIPPED | OUTPATIENT
Start: 2021-01-01 | End: 2021-01-01

## 2021-01-01 RX ORDER — VALACYCLOVIR HYDROCHLORIDE 500 MG/1
500 TABLET, FILM COATED ORAL
COMMUNITY
Start: 2021-01-01 | End: 2021-01-01 | Stop reason: SDUPTHER

## 2021-01-01 RX ORDER — GRANISETRON 3.1 MG/24H
1 PATCH TRANSDERMAL ONCE
Qty: 10 PATCH | Refills: 0 | COMMUNITY
Start: 2021-01-01 | End: 2021-01-01

## 2021-01-01 RX ORDER — FAMOTIDINE 20 MG
TABLET ORAL DAILY
COMMUNITY
End: 2021-01-01

## 2021-01-01 RX ORDER — SODIUM CHLORIDE 9 MG/ML
INJECTION, SOLUTION INTRAVENOUS
Status: COMPLETED | OUTPATIENT
Start: 2021-01-01 | End: 2021-01-01

## 2021-01-01 RX ORDER — ALBUMIN (HUMAN) 12.5 G/50ML
25 SOLUTION INTRAVENOUS ONCE
Status: COMPLETED | OUTPATIENT
Start: 2021-01-01 | End: 2021-01-01

## 2021-01-01 RX ORDER — SODIUM CHLORIDE 0.9 % (FLUSH) 0.9 %
3 SYRINGE (ML) INJECTION EVERY 12 HOURS SCHEDULED
Status: CANCELLED | OUTPATIENT
Start: 2021-01-01

## 2021-01-01 RX ORDER — MORPHINE SULFATE 20 MG/ML
2.5 SOLUTION ORAL
COMMUNITY

## 2021-01-01 RX ORDER — MIDAZOLAM HYDROCHLORIDE 1 MG/ML
INJECTION INTRAMUSCULAR; INTRAVENOUS
Status: COMPLETED | OUTPATIENT
Start: 2021-01-01 | End: 2021-01-01

## 2021-01-01 RX ORDER — OLMESARTAN MEDOXOMIL AND HYDROCHLOROTHIAZIDE 20/12.5 20; 12.5 MG/1; MG/1
1 TABLET ORAL DAILY
Qty: 30 TABLET | Refills: 11 | Status: SHIPPED | OUTPATIENT
Start: 2021-01-01 | End: 2021-01-01

## 2021-01-01 RX ORDER — OLMESARTAN MEDOXOMIL AND HYDROCHLOROTHIAZIDE 20/12.5 20; 12.5 MG/1; MG/1
1 TABLET ORAL DAILY
COMMUNITY
Start: 2021-01-01 | End: 2021-01-01

## 2021-01-01 RX ADMIN — DEXAMETHASONE SODIUM PHOSPHATE 12 MG: 10 INJECTION, SOLUTION INTRAMUSCULAR; INTRAVENOUS at 12:45

## 2021-01-01 RX ADMIN — DOCUSATE SODIUM 50MG AND SENNOSIDES 8.6MG 2 TABLET: 8.6; 5 TABLET, FILM COATED ORAL at 10:58

## 2021-01-01 RX ADMIN — Medication 500 UNITS: at 14:35

## 2021-01-01 RX ADMIN — LIDOCAINE HYDROCHLORIDE 8 ML: 10 INJECTION, SOLUTION INFILTRATION; PERINEURAL at 08:29

## 2021-01-01 RX ADMIN — SODIUM CHLORIDE 500 ML: 900 INJECTION, SOLUTION INTRAVENOUS at 13:03

## 2021-01-01 RX ADMIN — Medication 500 UNITS: at 09:39

## 2021-01-01 RX ADMIN — POTASSIUM CHLORIDE 20 MEQ: 1500 TABLET, EXTENDED RELEASE ORAL at 10:09

## 2021-01-01 RX ADMIN — DIPHENHYDRAMINE HYDROCHLORIDE 50 MG: 50 INJECTION INTRAMUSCULAR; INTRAVENOUS at 08:38

## 2021-01-01 RX ADMIN — VINCRISTINE SULFATE 2 MG: 1 INJECTION, SOLUTION INTRAVENOUS at 14:09

## 2021-01-01 RX ADMIN — CETIRIZINE HYDROCHLORIDE 10 MG: 10 TABLET ORAL at 10:58

## 2021-01-01 RX ADMIN — FAMOTIDINE 20 MG: 10 INJECTION, SOLUTION INTRAVENOUS at 08:34

## 2021-01-01 RX ADMIN — Medication 500 UNITS: at 13:54

## 2021-01-01 RX ADMIN — PERFLUTREN 1.5 ML: 6.52 INJECTION, SUSPENSION INTRAVENOUS at 07:46

## 2021-01-01 RX ADMIN — SODIUM CHLORIDE 500 ML: 9 INJECTION, SOLUTION INTRAVENOUS at 12:30

## 2021-01-01 RX ADMIN — SODIUM CHLORIDE, PRESERVATIVE FREE 10 ML: 5 INJECTION INTRAVENOUS at 14:35

## 2021-01-01 RX ADMIN — SODIUM CHLORIDE 500 ML: 9 INJECTION, SOLUTION INTRAVENOUS at 08:20

## 2021-01-01 RX ADMIN — RITUXIMAB 900 MG: 10 INJECTION, SOLUTION INTRAVENOUS at 12:02

## 2021-01-01 RX ADMIN — ACETAMINOPHEN 650 MG: 325 TABLET ORAL at 08:58

## 2021-01-01 RX ADMIN — RITUXIMAB 930 MG: 10 INJECTION, SOLUTION INTRAVENOUS at 09:59

## 2021-01-01 RX ADMIN — ALLOPURINOL 300 MG: 300 TABLET ORAL at 10:58

## 2021-01-01 RX ADMIN — SODIUM CHLORIDE 500 ML: 9 INJECTION, SOLUTION INTRAVENOUS at 12:51

## 2021-01-01 RX ADMIN — SODIUM CHLORIDE, PRESERVATIVE FREE 10 ML: 5 INJECTION INTRAVENOUS at 20:52

## 2021-01-01 RX ADMIN — SODIUM CHLORIDE 250 ML: 9 INJECTION, SOLUTION INTRAVENOUS at 08:37

## 2021-01-01 RX ADMIN — GELATIN ABSORBABLE SPONGE 12-7 MM: 12-7 MISC at 13:45

## 2021-01-01 RX ADMIN — CYCLOPHOSPHAMIDE 1870 MG: 2 INJECTION, POWDER, FOR SOLUTION INTRAVENOUS; ORAL at 14:24

## 2021-01-01 RX ADMIN — FUROSEMIDE 20 MG: 10 INJECTION, SOLUTION INTRAMUSCULAR; INTRAVENOUS at 16:03

## 2021-01-01 RX ADMIN — FENTANYL CITRATE 50 MCG: 50 INJECTION INTRAMUSCULAR; INTRAVENOUS at 13:19

## 2021-01-01 RX ADMIN — Medication 500 UNITS: at 10:49

## 2021-01-01 RX ADMIN — FAMOTIDINE 20 MG: 10 INJECTION INTRAVENOUS at 10:15

## 2021-01-01 RX ADMIN — DIPHENHYDRAMINE HYDROCHLORIDE 50 MG: 50 INJECTION INTRAMUSCULAR; INTRAVENOUS at 10:07

## 2021-01-01 RX ADMIN — SODIUM CHLORIDE 500 ML: 9 INJECTION, SOLUTION INTRAVENOUS at 16:23

## 2021-01-01 RX ADMIN — BENDAMUSTINE HYDROCHLORIDE 215 MG: 100 INJECTION INTRAVENOUS at 11:18

## 2021-01-01 RX ADMIN — LIDOCAINE HYDROCHLORIDE 9 ML: 10 INJECTION, SOLUTION INFILTRATION; PERINEURAL at 09:29

## 2021-01-01 RX ADMIN — SODIUM CHLORIDE 500 ML: 9 INJECTION, SOLUTION INTRAVENOUS at 09:28

## 2021-01-01 RX ADMIN — ACETAMINOPHEN 650 MG: 325 TABLET ORAL at 08:34

## 2021-01-01 RX ADMIN — FAMOTIDINE 20 MG: 10 INJECTION INTRAVENOUS at 09:02

## 2021-01-01 RX ADMIN — PALONOSETRON 0.25 MG: 0.05 INJECTION, SOLUTION INTRAVENOUS at 12:45

## 2021-01-01 RX ADMIN — SODIUM CHLORIDE, PRESERVATIVE FREE 10 ML: 5 INJECTION INTRAVENOUS at 10:59

## 2021-01-01 RX ADMIN — DIPHENHYDRAMINE HYDROCHLORIDE 25 MG: 25 CAPSULE ORAL at 10:37

## 2021-01-01 RX ADMIN — SODIUM CHLORIDE 25 ML/HR: 9 INJECTION, SOLUTION INTRAVENOUS at 13:08

## 2021-01-01 RX ADMIN — FLUDEOXYGLUCOSE F18 1 DOSE: 300 INJECTION INTRAVENOUS at 08:41

## 2021-01-01 RX ADMIN — LIDOCAINE HYDROCHLORIDE 7 ML: 10 INJECTION, SOLUTION INFILTRATION; PERINEURAL at 13:40

## 2021-01-01 RX ADMIN — SODIUM CHLORIDE 1000 ML: 9 INJECTION, SOLUTION INTRAVENOUS at 17:30

## 2021-01-01 RX ADMIN — DOXORUBICIN HYDROCHLORIDE 124 MG: 2 INJECTION, SOLUTION INTRAVENOUS at 13:35

## 2021-01-01 RX ADMIN — SODIUM CHLORIDE 100 ML: 9 INJECTION, SOLUTION INTRAVENOUS at 08:56

## 2021-01-01 RX ADMIN — VINCRISTINE SULFATE 2 MG: 1 INJECTION, SOLUTION INTRAVENOUS at 13:19

## 2021-01-01 RX ADMIN — FLUDEOXYGLUCOSE F18 1 DOSE: 300 INJECTION INTRAVENOUS at 07:23

## 2021-01-01 RX ADMIN — PEGFILGRASTIM 6 MG: KIT SUBCUTANEOUS at 14:49

## 2021-01-01 RX ADMIN — SODIUM CHLORIDE 250 ML: 9 INJECTION, SOLUTION INTRAVENOUS at 08:34

## 2021-01-01 RX ADMIN — ONDANSETRON 4 MG: 2 INJECTION INTRAMUSCULAR; INTRAVENOUS at 19:18

## 2021-01-01 RX ADMIN — DIPHENHYDRAMINE HYDROCHLORIDE 50 MG: 50 INJECTION, SOLUTION INTRAMUSCULAR; INTRAVENOUS at 08:37

## 2021-01-01 RX ADMIN — MIDAZOLAM 1 MG: 1 INJECTION INTRAMUSCULAR; INTRAVENOUS at 13:18

## 2021-01-01 RX ADMIN — METOPROLOL TARTRATE 25 MG: 25 TABLET, FILM COATED ORAL at 20:52

## 2021-01-01 RX ADMIN — Medication 500 UNITS: at 13:35

## 2021-01-01 RX ADMIN — ALPRAZOLAM 0.12 MG: 0.25 TABLET ORAL at 22:59

## 2021-01-01 RX ADMIN — FAMOTIDINE 20 MG: 10 INJECTION, SOLUTION INTRAVENOUS at 09:06

## 2021-01-01 RX ADMIN — PALONOSETRON 0.25 MG: 0.05 INJECTION, SOLUTION INTRAVENOUS at 13:27

## 2021-01-01 RX ADMIN — DOXORUBICIN HYDROCHLORIDE 124 MG: 2 INJECTION, SOLUTION INTRAVENOUS at 13:58

## 2021-01-01 RX ADMIN — SODIUM CHLORIDE 500 ML: 900 INJECTION, SOLUTION INTRAVENOUS at 12:00

## 2021-01-01 RX ADMIN — DOXORUBICIN HYDROCHLORIDE 124 MG: 2 INJECTION, SOLUTION INTRAVENOUS at 13:03

## 2021-01-01 RX ADMIN — ONDANSETRON 4 MG: 4 TABLET, ORALLY DISINTEGRATING ORAL at 08:24

## 2021-01-01 RX ADMIN — DIPHENHYDRAMINE HYDROCHLORIDE 25 MG: 25 CAPSULE ORAL at 10:21

## 2021-01-01 RX ADMIN — VINCRISTINE SULFATE 2 MG: 1 INJECTION, SOLUTION INTRAVENOUS at 13:52

## 2021-01-01 RX ADMIN — SODIUM CHLORIDE, PRESERVATIVE FREE 10 ML: 5 INJECTION INTRAVENOUS at 09:39

## 2021-01-01 RX ADMIN — RITUXIMAB 930 MG: 10 INJECTION, SOLUTION INTRAVENOUS at 09:27

## 2021-01-01 RX ADMIN — SODIUM CHLORIDE, PRESERVATIVE FREE 10 ML: 5 INJECTION INTRAVENOUS at 08:09

## 2021-01-01 RX ADMIN — FAMOTIDINE 20 MG: 10 INJECTION INTRAVENOUS at 08:37

## 2021-01-01 RX ADMIN — PEGFILGRASTIM 6 MG: KIT SUBCUTANEOUS at 14:07

## 2021-01-01 RX ADMIN — PEGFILGRASTIM 6 MG: KIT SUBCUTANEOUS at 13:30

## 2021-01-01 RX ADMIN — SODIUM CHLORIDE, PRESERVATIVE FREE 10 ML: 5 INJECTION INTRAVENOUS at 13:34

## 2021-01-01 RX ADMIN — PROCHLORPERAZINE EDISYLATE 10 MG: 5 INJECTION INTRAMUSCULAR; INTRAVENOUS at 12:35

## 2021-01-01 RX ADMIN — METOPROLOL TARTRATE 25 MG: 25 TABLET, FILM COATED ORAL at 10:58

## 2021-01-01 RX ADMIN — TAZOBACTAM SODIUM AND PIPERACILLIN SODIUM 3.38 G: 375; 3 INJECTION, SOLUTION INTRAVENOUS at 01:20

## 2021-01-01 RX ADMIN — Medication 500 UNITS: at 16:49

## 2021-01-01 RX ADMIN — VALACYCLOVIR 500 MG: 500 TABLET, FILM COATED ORAL at 10:58

## 2021-01-01 RX ADMIN — DEXAMETHASONE SODIUM PHOSPHATE 12 MG: 10 INJECTION, SOLUTION INTRAMUSCULAR; INTRAVENOUS at 08:57

## 2021-01-01 RX ADMIN — SODIUM CHLORIDE 250 ML: 9 INJECTION, SOLUTION INTRAVENOUS at 09:06

## 2021-01-01 RX ADMIN — LIDOCAINE HYDROCHLORIDE 20 ML: 10 INJECTION, SOLUTION INFILTRATION; PERINEURAL at 13:47

## 2021-01-01 RX ADMIN — PALONOSETRON 0.25 MG: 0.05 INJECTION, SOLUTION INTRAVENOUS at 13:13

## 2021-01-01 RX ADMIN — POLATUZUMAB VEDOTIN 194 MG: 140 INJECTION, POWDER, LYOPHILIZED, FOR SOLUTION INTRAVENOUS at 11:18

## 2021-01-01 RX ADMIN — DIPHENHYDRAMINE HYDROCHLORIDE 50 MG: 50 INJECTION INTRAMUSCULAR; INTRAVENOUS at 09:07

## 2021-01-01 RX ADMIN — SODIUM CHLORIDE 250 ML: 9 INJECTION, SOLUTION INTRAVENOUS at 08:30

## 2021-01-01 RX ADMIN — ONDANSETRON 8 MG: 2 INJECTION, SOLUTION INTRAMUSCULAR; INTRAVENOUS at 11:16

## 2021-01-01 RX ADMIN — SODIUM CHLORIDE, PRESERVATIVE FREE 10 ML: 5 INJECTION INTRAVENOUS at 13:52

## 2021-01-01 RX ADMIN — ACETAMINOPHEN 650 MG: 325 TABLET ORAL at 10:10

## 2021-01-01 RX ADMIN — SODIUM CHLORIDE 500 ML: 9 INJECTION, SOLUTION INTRAVENOUS at 10:36

## 2021-01-01 RX ADMIN — BENDAMUSTINE HYDROCHLORIDE 215 MG: 100 INJECTION INTRAVENOUS at 09:49

## 2021-01-01 RX ADMIN — SODIUM CHLORIDE 100 ML: 9 INJECTION, SOLUTION INTRAVENOUS at 09:26

## 2021-01-01 RX ADMIN — SODIUM CHLORIDE, PRESERVATIVE FREE 10 ML: 5 INJECTION INTRAVENOUS at 13:30

## 2021-01-01 RX ADMIN — SODIUM CHLORIDE 1870 MG: 900 INJECTION, SOLUTION INTRAVENOUS at 14:08

## 2021-01-01 RX ADMIN — TAZOBACTAM SODIUM AND PIPERACILLIN SODIUM 3.38 G: 375; 3 INJECTION, SOLUTION INTRAVENOUS at 10:58

## 2021-01-01 RX ADMIN — DEXAMETHASONE SODIUM PHOSPHATE 12 MG: 10 INJECTION, SOLUTION INTRAMUSCULAR; INTRAVENOUS at 13:13

## 2021-01-01 RX ADMIN — SODIUM CHLORIDE 100 ML: 9 INJECTION, SOLUTION INTRAVENOUS at 08:54

## 2021-01-01 RX ADMIN — IOPAMIDOL 95 ML: 755 INJECTION, SOLUTION INTRAVENOUS at 11:03

## 2021-01-01 RX ADMIN — DEXAMETHASONE SODIUM PHOSPHATE 12 MG: 10 INJECTION, SOLUTION INTRAMUSCULAR; INTRAVENOUS at 13:27

## 2021-01-01 RX ADMIN — Medication 500 UNITS: at 13:30

## 2021-01-01 RX ADMIN — ZOLEDRONIC ACID 4 MG: 0.04 INJECTION, SOLUTION INTRAVENOUS at 13:28

## 2021-01-01 RX ADMIN — VALACYCLOVIR 500 MG: 500 TABLET, FILM COATED ORAL at 20:52

## 2021-01-01 RX ADMIN — ACETAMINOPHEN 650 MG: 325 TABLET ORAL at 09:06

## 2021-01-01 RX ADMIN — HYDROCORTISONE SODIUM SUCCINATE 200 MG: 100 INJECTION, POWDER, FOR SOLUTION INTRAMUSCULAR; INTRAVENOUS at 10:57

## 2021-01-01 RX ADMIN — Medication 500 UNITS: at 16:59

## 2021-01-01 RX ADMIN — ACETAMINOPHEN 650 MG: 325 TABLET, FILM COATED ORAL at 10:21

## 2021-01-01 RX ADMIN — TAZOBACTAM SODIUM AND PIPERACILLIN SODIUM 3.38 G: 375; 3 INJECTION, SOLUTION INTRAVENOUS at 18:52

## 2021-01-01 RX ADMIN — ALBUMIN HUMAN 25 G: 0.25 SOLUTION INTRAVENOUS at 13:00

## 2021-01-01 RX ADMIN — CYCLOPHOSPHAMIDE 1870 MG: 200 INJECTION, SOLUTION INTRAVENOUS at 13:30

## 2021-01-01 RX ADMIN — DEXAMETHASONE SODIUM PHOSPHATE 12 MG: 10 INJECTION, SOLUTION INTRAMUSCULAR; INTRAVENOUS at 10:32

## 2021-01-01 RX ADMIN — SODIUM CHLORIDE 250 ML: 9 INJECTION, SOLUTION INTRAVENOUS at 09:07

## 2021-01-01 RX ADMIN — RITUXIMAB 930 MG: 10 INJECTION, SOLUTION INTRAVENOUS at 09:33

## 2021-01-01 RX ADMIN — ACETAMINOPHEN 650 MG: 325 TABLET ORAL at 08:37

## 2021-01-01 RX ADMIN — LIDOCAINE HYDROCHLORIDE 5 ML: 10 INJECTION, SOLUTION INFILTRATION; PERINEURAL at 10:03

## 2021-01-01 RX ADMIN — SODIUM CHLORIDE 750 ML/HR: 9 INJECTION, SOLUTION INTRAVENOUS at 08:52

## 2021-01-01 RX ADMIN — HYDROCORTISONE SODIUM SUCCINATE 100 MG: 100 INJECTION, POWDER, FOR SOLUTION INTRAMUSCULAR; INTRAVENOUS at 09:01

## 2021-01-01 RX ADMIN — FAMOTIDINE 40 MG: 10 INJECTION INTRAVENOUS at 12:34

## 2021-01-01 RX ADMIN — PALONOSETRON 0.25 MG: 0.05 INJECTION, SOLUTION INTRAVENOUS at 10:29

## 2021-01-01 RX ADMIN — Medication 500 UNITS: at 08:09

## 2021-01-01 RX ADMIN — SODIUM CHLORIDE 500 ML: 900 INJECTION, SOLUTION INTRAVENOUS at 12:15

## 2021-01-01 RX ADMIN — PANTOPRAZOLE SODIUM 40 MG: 40 TABLET, DELAYED RELEASE ORAL at 06:50

## 2021-01-06 NOTE — NURSING NOTE
Lab Results   Component Value Date    WBC 0.57 (C) 01/06/2021    HGB 10.6 (L) 01/06/2021    HCT 31.6 (L) 01/06/2021    MCV 90.5 01/06/2021     01/06/2021   ANC 0.30   Pt is here for lab with RN review.  CBC reviewed with pt, counts are stable for this pt at this time. Pt has no complaints except for feeling tired. Afebrile. VSS. Denies fevers or signs of infection. Labs reviewed with Dr. Rodriges. No new orders. Discussed need to call for fever of 100.4 or signs of infection.  Discussed neutropenic precautions and printed info from [email or web address] provided on neutropenia. Copy of labs given to pt and f/u appt reviewed. Pt is instructed to call the office with any concerns or new symptoms prior to next visit. Adrien larson

## 2021-01-13 NOTE — PROGRESS NOTES
Pt here for RN review. Pt reports feeling well with no complaints. Counts have improved since last visit. Will call pt once chemistries result. Pt v/u and provided copy of labs.    Lab Results   Component Value Date    WBC 6.40 01/13/2021    HGB 10.9 (L) 01/13/2021    HCT 31.9 (L) 01/13/2021    MCV 91.9 01/13/2021     01/13/2021     Chemistries are WNL. Pt v/u.    Lab Results   Component Value Date    GLUCOSE 102 01/13/2021    BUN 17 01/13/2021    CREATININE 1.11 01/13/2021    EGFRIFNONA 68 01/13/2021    EGFRIFAFRI 75 02/24/2020    BCR 15.3 01/13/2021    K 3.6 01/13/2021    CO2 27.7 01/13/2021    CALCIUM 9.4 01/13/2021    PROTENTOTREF 7.8 10/29/2020    ALBUMIN 4.30 01/13/2021    LABIL2 1.3 10/29/2020    AST 15 01/13/2021    ALT 21 01/13/2021

## 2021-01-20 PROBLEM — Z79.899 HIGH RISK MEDICATION USE: Status: ACTIVE | Noted: 2021-01-01

## 2021-01-20 PROBLEM — Z91.89 AT HIGH RISK OF TUMOR LYSIS SYNDROME: Status: RESOLVED | Noted: 2020-01-01 | Resolved: 2021-01-01

## 2021-01-20 NOTE — PROGRESS NOTES
Subjective         REASON FOR FOLLOW UP:  Large abdominal mass most likely lymphoma: biopsy shows grade 3 follicular lymphoma stage IV by PET SCAN    HISTORY OF PRESENT ILLNESS:   PATIENT WAS CALLED THE DAY BEFORE BY THE OFFICE TO ASK FOR SYMPTOMS THAT COULD BE CONSISTENT WITH CORONAVIRUS INFECTION, AND BEING NEGATIVE WAS SCHEDULED TO BE SEEN IN THE OFFICE TODAY. SIMILAR QUESTIONING TODAY INCLUDING, CHILLS, FEVER, NEW COUGH, SHORTNESS OF BREATH, DIARRHEA,DIFFUSE BODY ACHES  AND CHANGES IN SMELL OR TASTE WERE NEGATIVE.THE PATIENT DENIED ANY CONTACT WITH PERSONS WHO WERE POSITIVE FOR COVID, AND PATIENT IS NOT IN CATEGORY OF HIGH RISK BEHAVIOR TO ACQUIRE COVID.    DURING THE VISIT WITH THE PATIENT TODAY , PATIENT HAD FACE MASK, MY MEDICAL ASSISTANT AND I  HAD PROPPER PROTECTIVE EQUIPMENT, AND I DID HAND HYGIENE WITH SOAP AND WATER BEFORE AND AFTER THE VISIT.    This patient returns today to the office for follow up. He is here today in company of his wife stating that he has been feeling terrific with no abdominal pain, distention, nausea, vomiting, fever, chills or sweats. In spite of having neutropenia he has not developed any fever. He has not developed any peripheral neuropathy. He remains on his prednisone for 5 days after each one of the treatments with no difficulties whatsoever. His energy level is acceptable, his bowel function and urination are normal. No cardiovascular or respiratory issues of any nature. The patient otherwise feels well.           Past Medical History:   Diagnosis Date   • Allergic rhinitis    • Gout    • Grade 3 follicular lymphoma of lymph nodes of multiple regions (CMS/HCC) 2020   • H/O foreign travel 09/2020    [place], [place]   • Hyperlipidemia    • Hypertension         Past Surgical History:   Procedure Laterality Date   • COLONOSCOPY  2014    normal   • TONSILLECTOMY     • VENOUS ACCESS DEVICE (PORT) INSERTION N/A 11/16/2020    Procedure: mediport placement;  Surgeon: Caitlin Bethea  Sree Werner MD;  Location: [facility] MAIN OR;  Service: Vascular;  Laterality: N/A;        Current Outpatient Medications on File Prior to Visit   Medication Sig Dispense Refill   • allopurinol (ZYLOPRIM) 300 MG tablet Take 1 tablet by mouth Daily. 90 tablet 3   • cetirizine (zyrTEC) 10 MG tablet Take 1 tablet by mouth Daily. 90 tablet 1   • HYDROcodone-acetaminophen (NORCO) 5-325 MG per tablet Take 1 tablet by mouth Every 6 (Six) Hours As Needed for Moderate Pain . 30 tablet 0   • metoprolol succinate XL (TOPROL-XL) 50 MG 24 hr tablet TAKE 1 TABLET BY MOUTH DAILY 90 tablet 1   • mupirocin (Bactroban) 2 % ointment Apply  topically to the appropriate area as directed 3 (Three) Times a Day. 15 g 1   • Olmesartan-amLODIPine-HCTZ 40-5-25 MG tablet Take 1 tablet by mouth Daily. 90 tablet 1   • ondansetron (ZOFRAN) 8 MG tablet Take 1 tablet by mouth Every 8 (Eight) Hours As Needed for Nausea or Vomiting. 30 tablet 5   • predniSONE (DELTASONE) 50 MG tablet Take it every 3 weeks along with chemotherapy iv medication and for 5 days 10 tablet 5     No current facility-administered medications on file prior to visit.         ALLERGIES:  No Known Allergies     Social History     Socioeconomic History   • Marital status:      Spouse name: Aubrie   • Number of children: 2   • Years of education: College   • Highest education level: Not on file   Occupational History   • Occupation: Sales     Employer: [employer]   Tobacco Use   • Smoking status: Never Smoker   • Smokeless tobacco: Never Used   Substance and Sexual Activity   • Alcohol use: Yes     Alcohol/week: 6.0 - 8.0 standard drinks     Types: 6 - 8 Cans of beer per week     Comment: 2 times per week   • Drug use: No   • Sexual activity: Defer      ONCOLOGIC HISTORY:The patient is a 60 y.o. year old male who is here for an opinion about the above issue.  I had the opportunity to see this delightful individual in consultation along with his wife today in the  office a 60-year-old white male who came to Mr. James Epley, APRN, at [facility] for routine assessment every 6 month visit and upon clinical examination he was found to have a very large abdominal mass. Obviously a CT scan was performed that documents a very large mass that is close to the stomach independent of the spleen, pancreas, left kidney and bowel and probably representing a conglomerate of masses and tumors that probably represents a non-Hodgkin's lymphoma. The patient states that he has had some early satiety in the last several months and he is not eating as much as he used to. He also has mild element of constipation, his bowel movement is harder than usual once or twice a day. He has not seen any passage of blood in the stool. He denies emphatically any abdominal pain, sensation of fullness and he denies any fever, chills. A few nights ago he had some night sweats but he assumes it is because he had too many blankets on him. He has not had any pruritus. He does not feel fatigued. He denies any cough or sputum production, no shortness of breath, no pleuritic pain. He has not had any rashes in the skin. He denies any joint pain or bone pain. He denies any neurological symptomatology. He has no urinary complaints with no frequency, urgency or hematuria. He denies any other alterations at this time.     In Denise reviewed the patient back on 11/12/2020 with his wife in order to review the PET scan and the pathology report of the abdominal mass. 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 The Ki-67 tumor is 30%.     I reviewed with the patient and his wife the PET scan. This is very dramatic in regard to the SUV activity uptake in the large abdominal mass and also documents retroperitoneal adenopathy, compromise of the peritoneum and also compromise of the internal mammary nodes. There is right  supraclavicular adenopathy. The PET scan is more than striking. eparation for definitive chemotherapy with CHOP and Rituxan I proposed to the patient and his wife the followin. He will need to have an echocardiogram before initiation of chemotherapy. I explained to him the reason behind that.   2. I want for him to remain on metoprolol that will be cardiac protection for the time being.   3. I would like for the patient to modify his dose of allopurinol to 300 mg a day starting as per today to prevent tumor lysis syndrome.  4. The patient will be educated about CHOP and Rituxan in the next day or so. I discussed with him side effects of the medicines including anemia, leukopenia, thrombocytopenia, peripheral neuropathy, allergic reactions, fever and chills related to Rituxan administration.   5. I discussed with them the length of the 1st treatment that will require probably 6-7 hours in the office.  6. The patient has high risk of tumor lysis syndrome and for that reason he will come on day 2 and day 3 to receive 1000 cc of normal saline each one of those days and he will require BMP, uric acid, phosphorus and LDH each one of those days.   7. The patient will require Vascular Surgery consultation to proceed with port placement.   8. I indicated how a port is placed and I showed him the device.   9. The patient will be supported by Neulasta On-body and I explained to him how this device works and the side effects of Neulasta including bone pain that could be bad enough to require pain medicine.   10. I indicated to the patient that he will require chemotherapy treatment every 3 weeks for a total of 6 cycles and he will repeat a PET scan after cycle 2 and after cycle 6. He will have a CT scan after cycle 4.     The patient was further reviewed on 2020. He has completed already 2 cycles of chemotherapy. We scheduled him to have a PET scan that was not approved by his insurance and instead of this they  "approved a CT scan. I have reviewed the CT scan with the patient today and his wife. My personal interpretation shows in my opinion some disagreement with the radiologist’s interpretation. The radiologist only measured the mass in only 1 area. I measured the mass in volume and it is dramatically different. He has had probably a 75% reduction in the total volume of tumor in the left upper quadrant of his abdomen. We compared the tumor at different levels and different locations and different views. This is more obvious to me. He has normal bowel activity otherwise. His gallbladder, liver and spleen remain normal. Pancreas and kidneys remain normal. 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 In the chest he had resolution of both his sites of disease if we compare this with the PET scan that was done at the time of the diagnosis.     I do not believe that the patient has encountered any significant side effects of the treatment with the exception of minimal anemia documented today with a hemoglobin of 11.6. His white count and his platelet count are normal. His chemistry profile is pending. The patient remains on allopurinol and he will stay on this medicine for the time being not only because he used to have gout and high uric acid before it is also the need for him to continue prophylaxis for tumor lysis even though doubt that this will be happening with the volume of tumor that is leftover.         Family History   Problem Relation Age of Onset   • Rheum arthritis Brother                     Objective     Vitals:    01/20/21 0754   BP: 114/69   Pulse: 99   Resp: 19   Temp: 97.3 °F (36.3 °C)   TempSrc: Temporal   SpO2: 96%   Weight: 115 kg (254 lb 6.4 oz)   Height: 193 cm (75.98\")   PainSc: 0-No pain     Current Status 1/20/2021   ECOG score 0       Physical Exam         I HAVE PERSONALLY REVIEWED THE HISTORY OF THE PRESENT ILLNESS, PAST MEDICAL HISTORY, FAMILY HISTORY, SOCIAL " HISTORY, ALLERGIES, MEDICATIONS STATED ABOVE IN THE OFFICE NOTE FROM TODAY.        GENERAL:  Well-developed, well-nourished  Patient  in no acute distress.   SKIN:  Warm, dry ,NO rashes,NO purpura ,NO petechiae.  HEENT:  Pupils were equal and reactive to light and accomodation, conjunctivae noninjected, no pterygium, normal extraocular movements, normal visual acuity.   NECK:  Supple with good range of motion; no thyromegaly or masses, no JVD or bruits, no cervical adenopathies.No carotid artery pain, no carotid abnormal pulsation , NO arterial dance.  LYMPHATICS:  No cervical, NO supraclavicular, NO axillary,NO epitrochlear , NO inguinal adenopathy.  CARDIAC   normal rate and regular rhythm, without murmur,NO rubs NO S3 NO S4 right or left . Normal femoral, popliteal, pedis, brachial and carotid pulses.  VASCULAR ARTERIAL: normal carotids,brachial,radial,femoral,popliteal, pedis pulses , no bruits.no paleness or cyanosis, no pain, no edema, no numbness, no gangrene.  VASCULAR VENOUS: no cyanosis, collateral circulation, varicosities, edema, palpable cords, pain, erythema.  ABDOMEN:  Soft, nontender with no hepatomegaly, no splenomegaly, no ascites, no collateral circulation,no distention,no Jackson sign, no abdominal pain, no inguinal hernias,no umbilical hernia, no abdominal bruits. There is a palpable mass in the left upper quadrant of his abdomen that is hard, mobile, nontender measuring 4 cm in diameter. This is dramatically different from the previous large mass that used to measure almost 30 cm across.       GENITAL: Not  Performed.  EXTREMITIES  AND SPINE:  No clubbing, cyanosis or edema, no deformities or pain .No kyphosis, scoliosis, deformities or pain in spine, ribs or pelvic bone.  NEUROLOGICAL:  Patient was awake, alert, oriented to time, person and place.                RECENT LABS:        Hematology WBC   Date Value Ref Range Status   01/20/2021 4.40 3.40 - 10.80 10*3/mm3 Final     RBC   Date Value  Ref Range Status   01/20/2021 3.73 (L) 4.14 - 5.80 10*6/mm3 Final     Hemoglobin   Date Value Ref Range Status   01/20/2021 11.5 (L) 13.0 - 17.7 g/dL Final     Hematocrit   Date Value Ref Range Status   01/20/2021 34.4 (L) 37.5 - 51.0 % Final     Platelets   Date Value Ref Range Status   01/20/2021 286 140 - 450 10*3/mm3 Final       CBC:    WBC   Date Value Ref Range Status   01/20/2021 4.40 3.40 - 10.80 10*3/mm3 Final     RBC   Date Value Ref Range Status   01/20/2021 3.73 (L) 4.14 - 5.80 10*6/mm3 Final     Hemoglobin   Date Value Ref Range Status   01/20/2021 11.5 (L) 13.0 - 17.7 g/dL Final     Hematocrit   Date Value Ref Range Status   01/20/2021 34.4 (L) 37.5 - 51.0 % Final     MCV   Date Value Ref Range Status   01/20/2021 92.2 79.0 - 97.0 fL Final     MCH   Date Value Ref Range Status   01/20/2021 30.8 26.6 - 33.0 pg Final     MCHC   Date Value Ref Range Status   01/20/2021 33.4 31.5 - 35.7 g/dL Final     RDW   Date Value Ref Range Status   01/20/2021 17.2 (H) 12.3 - 15.4 % Final     RDW-SD   Date Value Ref Range Status   01/20/2021 56.8 (H) 37.0 - 54.0 fl Final     MPV   Date Value Ref Range Status   01/20/2021 9.3 6.0 - 12.0 fL Final     Platelets   Date Value Ref Range Status   01/20/2021 286 140 - 450 10*3/mm3 Final     Neutrophil %   Date Value Ref Range Status   01/20/2021 79.6 (H) 42.7 - 76.0 % Final     Lymphocyte %   Date Value Ref Range Status   01/20/2021 5.0 (L) 19.6 - 45.3 % Final     Monocyte %   Date Value Ref Range Status   01/20/2021 12.7 (H) 5.0 - 12.0 % Final     Eosinophil %   Date Value Ref Range Status   01/20/2021 0.9 0.3 - 6.2 % Final     Basophil %   Date Value Ref Range Status   01/20/2021 1.1 0.0 - 1.5 % Final     Immature Grans %   Date Value Ref Range Status   01/20/2021 0.7 (H) 0.0 - 0.5 % Final     Neutrophils, Absolute   Date Value Ref Range Status   01/20/2021 3.50 1.70 - 7.00 10*3/mm3 Final     Lymphocytes, Absolute   Date Value Ref Range Status   01/20/2021 0.22 (L) 0.70 -  3.10 10*3/mm3 Final     Monocytes, Absolute   Date Value Ref Range Status   01/20/2021 0.56 0.10 - 0.90 10*3/mm3 Final     Eosinophils, Absolute   Date Value Ref Range Status   01/20/2021 0.04 0.00 - 0.40 10*3/mm3 Final     Basophils, Absolute   Date Value Ref Range Status   01/20/2021 0.05 0.00 - 0.20 10*3/mm3 Final     Immature Grans, Absolute   Date Value Ref Range Status   01/20/2021 0.03 0.00 - 0.05 10*3/mm3 Final     nRBC   Date Value Ref Range Status   01/20/2021 0.0 0.0 - 0.2 /100 WBC Final        CMP:    Glucose   Date Value Ref Range Status   01/20/2021 119 74 - 124 mg/dL Final     BUN   Date Value Ref Range Status   01/20/2021 17 6 - 20 mg/dL Final     Creatinine   Date Value Ref Range Status   01/20/2021 1.18 0.70 - 1.30 mg/dL Final   12/23/2020 1.30 0.60 - 1.30 mg/dL Final     Comment:     Serial Number: [id number]:  [id number]     Sodium   Date Value Ref Range Status   01/20/2021 142 134 - 145 mmol/L Final     Potassium   Date Value Ref Range Status   01/20/2021 3.7 3.5 - 4.7 mmol/L Final     Chloride   Date Value Ref Range Status   01/20/2021 102 98 - 107 mmol/L Final     CO2   Date Value Ref Range Status   01/20/2021 28.7 22.0 - 29.0 mmol/L Final     Calcium   Date Value Ref Range Status   01/20/2021 10.0 8.5 - 10.2 mg/dL Final     Total Protein   Date Value Ref Range Status   01/20/2021 7.4 6.3 - 8.0 g/dL Final     Albumin   Date Value Ref Range Status   01/20/2021 4.70 3.50 - 5.20 g/dL Final     ALT (SGPT)   Date Value Ref Range Status   01/20/2021 22 0 - 41 U/L Final     AST (SGOT)   Date Value Ref Range Status   01/20/2021 28 0 - 40 U/L Final     Alkaline Phosphatase   Date Value Ref Range Status   01/20/2021 57 38 - 116 U/L Final     Total Bilirubin   Date Value Ref Range Status   01/20/2021 0.4 0.2 - 1.2 mg/dL Final     eGFR  Am   Date Value Ref Range Status   02/24/2020 75 >60 mL/min/1.73 Final     Globulin   Date Value Ref Range Status   01/20/2021 2.7 1.8 - 3.5 gm/dL Final     A/G  Ratio   Date Value Ref Range Status   01/20/2021 1.7 1.1 - 2.4 g/dL Final     BUN/Creatinine Ratio   Date Value Ref Range Status   01/20/2021 14.4 7.3 - 30.0 Final     Anion Gap   Date Value Ref Range Status   01/20/2021 11.3 5.0 - 15.0 mmol/L Final                    Assessment/plan:         In summary I have seen a very healthy 60-year-old individual who is not a smoker or a drinker who has hypertension and history of hyperlipidemia as well as previous history of hyperuricemia and gout who has been seen by primary Nurse Practitioner, Mr. James Epley, for routine assessment and was found to have a very large abdominal mass. A CT was performed, I have reviewed the CT scan in the PAC system [facility]. In de there is a very large mass independent of the kidney, pancreas, spleen, stomach and intestine. This mass is probably representation of conglomerate of lymph nodes and probably represents a non-Hodgkin's lymphoma. The patient so far has no major symptoms besides early satiety. He tried to lose weight since last fall and he lost 25 pounds of weight until the pandemia in March and April. Since then his weight has stabilized. The rest of his clinical exam discloses no cervical, axillary or inguinal adenopathies. Again he has no obvious B symptoms.       I reviewed the patient back on 11/12/2020 with his wife in order to review the PET scan and the pathology report of the abdominal mass. 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 The Ki-67 tumor is 30%.       I reviewed the patient back along with his wife on 01/20/2021. He has not encountered any side effects of the previous cycle of chemotherapy with the exception of neutropenia and no fever that recovered quickly. He also has developed some anemia associated with chemotherapy that has not had any implications and actually the hemoglobin today is  better at 11. The patient has not developed any sensory neuropathy in his fingers or toes associated with vincristine use. His weight has remained stable. His appetite is good. He has no cancer related pain and he has not developed any issues in regard uric acid accumulation.     I do believe that the patient's mass is dramatically reduced to 4 cm in diameter in the left upper quadrant in comparison with 30 cm mass that he had at the time of the initiation of his treatment. Given these facts I advised the patient the followin. Proceed today with his chemotherapy CHOP/Rituxan at the same dose.  2. I remind him to take his 5 days of prednisone that is part of his chemotherapy regimen.   3. He will remain on his allopurinol at the present dose not only for his previous history of gout but also for prevention of tumor lysis syndrome even though given the smaller volume of tumor left I doubt that this will occur at this point. It never occurred at the time of the initial treatment.   4. So far the patient has not developed any peripheral neuropathy. That is good news.   5. The patient has developed neutropenia. He will require continuation of blood counts on weekly basis and nurse visit. He will require monitoring this in detail.   6. I insisted in the need for us to have a PET scan and that way we have a good feel now after these cycles of chemotherapy where we stand. This will be his 4th cycle and that way we have in preparation for the future in regard prognosis and so forth.  7. I advised him that there is new information pertinent to vitamin D utilization in patients with non-Hodgkin's lymphoma that seems to be that vitamin D is important to decrease the chances of recurrence because of proper stimulation of the immune system. I advised him to take 1000 units a day.    I will review him back in 3 weeks when he will be ready for his 5th cycle.    I do believe that the patient is doing fantastic and he is  handling the treatment much better than I expected.     His wife was instrumental in regard the question in regard how to proceed, the size of the mass and the need for continuation of the medicines that we are using including the Neulasta On-body as well as the allopurinol and the prednisone.           I DISCUSSED WITH PATIENT IN DETAIL FORMS TO DECREASE CHANCES OF CORONAVIRUS INFECTION INCLUDING ISOLATION, PROPER HAND HIGIENE, AVOID PUBLIC PLACES  WITH CROWDS, FOLLOW  CDC RECOMENDATIONS, AND KEEP PERSONAL AND SOCIAL RESPONSIBILITY, WARE A MASK IN PUBLIC PLACES.  PATIENT IS AWARE THIS INFECTION COULD HAVE SEVERE CONSEQUENCES TO PERSONAL HEALTH AND FAMILY RAMIFICATIONS OF THIS.

## 2021-01-27 NOTE — PROGRESS NOTES
Pt here for RN review. Returning after 4th cycle of R-Chop last week. Pt states he is feeling good with no c/o. Eating and drinking well. No c/o pain, n/v/d. We reviewed neutropenic precautions and I asked the pt to call us with any new symptoms of infection. Pt v/u. Pt will return next week for PET scan and labs.     Lab Results   Component Value Date    WBC 0.86 (C) 01/27/2021    HGB 10.5 (L) 01/27/2021    HCT 31.0 (L) 01/27/2021    MCV 93.4 01/27/2021     01/27/2021

## 2021-02-10 NOTE — PROGRESS NOTES
Subjective         REASON FOR FOLLOW UP:  Large abdominal mass most likely lymphoma: biopsy shows grade 3 follicular lymphoma stage IV by PET SCAN    HISTORY OF PRESENT ILLNESS:   PATIENT WAS CALLED THE DAY BEFORE BY THE OFFICE TO ASK FOR SYMPTOMS THAT COULD BE CONSISTENT WITH CORONAVIRUS INFECTION, AND BEING NEGATIVE WAS SCHEDULED TO BE SEEN IN THE OFFICE TODAY. SIMILAR QUESTIONING TODAY INCLUDING, CHILLS, FEVER, NEW COUGH, SHORTNESS OF BREATH, DIARRHEA,DIFFUSE BODY ACHES  AND CHANGES IN SMELL OR TASTE WERE NEGATIVE.THE PATIENT DENIED ANY CONTACT WITH PERSONS WHO WERE POSITIVE FOR COVID, AND PATIENT IS NOT IN CATEGORY OF HIGH RISK BEHAVIOR TO ACQUIRE COVID.    DURING THE VISIT WITH THE PATIENT TODAY , PATIENT HAD FACE MASK, MY MEDICAL ASSISTANT AND I  HAD PROPPER PROTECTIVE EQUIPMENT, AND I DID HAND HYGIENE WITH SOAP AND WATER BEFORE AND AFTER THE VISIT.    This patient returns today to the office for follow up in company of his wife to proceed with his 5th cycle of chemotherapy with CHOP and Rituxan in the background of a follicular lymphoma grade 3, stage IV. The patient's tolerance to the regimen is excellent with no nausea, vomiting, mucositis, diarrhea, peripheral neuropathy or hematological toxicity. In spite of having very bulky disease at the time of the original diagnosis he never developed tumor lysis syndrome and we were very proactive protecting him from that. He is here today after he has had finally a PET scan to review today. Physically he feels terrific with wonderful appetite, no nausea, vomiting, abdominal pain and no abdominal distention. Minimal constipation with vincristine that is relieved with proactive measures. Urination is normal. He has no B symptoms. He has no cardiovascular or respiratory issues, no infections. Neulasta has not produced any bone pain. He has not developed any infections.             Past Medical History:   Diagnosis Date   • Allergic rhinitis    • Gout    • Grade 3  follicular lymphoma of lymph nodes of multiple regions (CMS/HCC) 2020   • H/O foreign travel 09/2020    Samuel, Jessie   • Hyperlipidemia    • Hypertension         Past Surgical History:   Procedure Laterality Date   • COLONOSCOPY  2014    normal   • TONSILLECTOMY     • VENOUS ACCESS DEVICE (PORT) INSERTION N/A 11/16/2020    Procedure: mediport placement;  Surgeon: Caitlin Bethea Jr., MD;  Location: [facility] OR;  Service: Vascular;  Laterality: N/A;        Current Outpatient Medications on File Prior to Visit   Medication Sig Dispense Refill   • allopurinol (ZYLOPRIM) 300 MG tablet Take 1 tablet by mouth Daily. 90 tablet 3   • cetirizine (zyrTEC) 10 MG tablet Take 1 tablet by mouth Daily. 90 tablet 1   • HYDROcodone-acetaminophen (NORCO) 5-325 MG per tablet Take 1 tablet by mouth Every 6 (Six) Hours As Needed for Moderate Pain . 30 tablet 0   • metoprolol succinate XL (TOPROL-XL) 50 MG 24 hr tablet TAKE 1 TABLET BY MOUTH DAILY 90 tablet 1   • mupirocin (Bactroban) 2 % ointment Apply  topically to the appropriate area as directed 3 (Three) Times a Day. 15 g 1   • Olmesartan-amLODIPine-HCTZ 40-5-25 MG tablet Take 1 tablet by mouth Daily. 90 tablet 1   • ondansetron (ZOFRAN) 8 MG tablet Take 1 tablet by mouth Every 8 (Eight) Hours As Needed for Nausea or Vomiting. 30 tablet 5   • predniSONE (DELTASONE) 50 MG tablet Take it every 3 weeks along with chemotherapy iv medication and for 5 days 10 tablet 5     No current facility-administered medications on file prior to visit.         ALLERGIES:  No Known Allergies     Social History     Socioeconomic History   • Marital status:      Spouse name: Aubrie   • Number of children: 2   • Years of education: College   • Highest education level: Not on file   Occupational History   • Occupation: Sales     Employer: [employer]   Tobacco Use   • Smoking status: Never Smoker   • Smokeless tobacco: Never Used   Substance and Sexual Activity   • Alcohol use:  Yes     Alcohol/week: 6.0 - 8.0 standard drinks     Types: 6 - 8 Cans of beer per week     Comment: 2 times per week   • Drug use: No   • Sexual activity: Defer      ONCOLOGIC HISTORY:The patient is a 60 y.o. year old male who is here for an opinion about the above issue.  I had the opportunity to see this delightful individual in consultation along with his wife today in the office a 60-year-old white male who came to Mr. James Epley, APRN, at [facility] for routine assessment every 6 month visit and upon clinical examination he was found to have a very large abdominal mass. Obviously a CT scan was performed that documents a very large mass that is close to the stomach independent of the spleen, pancreas, left kidney and bowel and probably representing a conglomerate of masses and tumors that probably represents a non-Hodgkin's lymphoma. The patient states that he has had some early satiety in the last several months and he is not eating as much as he used to. He also has mild element of constipation, his bowel movement is harder than usual once or twice a day. He has not seen any passage of blood in the stool. He denies emphatically any abdominal pain, sensation of fullness and he denies any fever, chills. A few nights ago he had some night sweats but he assumes it is because he had too many blankets on him. He has not had any pruritus. He does not feel fatigued. He denies any cough or sputum production, no shortness of breath, no pleuritic pain. He has not had any rashes in the skin. He denies any joint pain or bone pain. He denies any neurological symptomatology. He has no urinary complaints with no frequency, urgency or hematuria. He denies any other alterations at this time.     In Denise reviewed the patient back on 11/12/2020 with his wife in order to review the PET scan and the pathology report of the abdominal mass. The abdominal mass pathology documents that the patient has a follicular lymphoma grade  3A that is c-MYC negative, BCL2 negative, BCL6 negative. The Ki-67 tumor is 30%.     I reviewed with the patient and his wife the PET scan. This is very dramatic in regard to the SUV activity uptake in the large abdominal mass and also documents retroperitoneal adenopathy, compromise of the peritoneum and also compromise of the internal mammary nodes. There is right supraclavicular adenopathy. The PET scan is more than striking. eparation for definitive chemotherapy with CHOP and Rituxan I proposed to the patient and his wife the followin. He will need to have an echocardiogram before initiation of chemotherapy. I explained to him the reason behind that.   2. I want for him to remain on metoprolol that will be cardiac protection for the time being.   3. I would like for the patient to modify his dose of allopurinol to 300 mg a day starting as per today to prevent tumor lysis syndrome.  4. The patient will be educated about CHOP and Rituxan in the next day or so. I discussed with him side effects of the medicines including anemia, leukopenia, thrombocytopenia, peripheral neuropathy, allergic reactions, fever and chills related to Rituxan administration.   5. I discussed with them the length of the 1st treatment that will require probably 6-7 hours in the office.  6. The patient has high risk of tumor lysis syndrome and for that reason he will come on day 2 and day 3 to receive 1000 cc of normal saline each one of those days and he will require BMP, uric acid, phosphorus and LDH each one of those days.   7. The patient will require Vascular Surgery consultation to proceed with port placement.   8. I indicated how a port is placed and I showed him the device.   9. The patient will be supported by Neulasta On-body and I explained to him how this device works and the side effects of Neulasta including bone pain that could be bad enough to require pain medicine.   10. I indicated to the patient that he will require  chemotherapy treatment every 3 weeks for a total of 6 cycles and he will repeat a PET scan after cycle 2 and after cycle 6. He will have a CT scan after cycle 4.     The patient was further reviewed on 12/29/2020. He has completed already 2 cycles of chemotherapy. We scheduled him to have a PET scan that was not approved by his insurance and instead of this they approved a CT scan. I have reviewed the CT scan with the patient today and his wife. My personal interpretation shows in my opinion some disagreement with the radiologist’s interpretation. The radiologist only measured the mass in only 1 area. I measured the mass in volume and it is dramatically different. He has had probably a 75% reduction in the total volume of tumor in the left upper quadrant of his abdomen. We compared the tumor at different levels and different locations and different views. This is more obvious to me. He has normal bowel activity otherwise. His gallbladder, liver and spleen remain normal. Pancreas and kidneys remain normal. 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 In the chest he had resolution of both his sites of disease if we compare this with the PET scan that was done at the time of the diagnosis.     I do not believe that the patient has encountered any significant side effects of the treatment with the exception of minimal anemia documented today with a hemoglobin of 11.6. His white count and his platelet count are normal. His chemistry profile is pending. The patient remains on allopurinol and he will stay on this medicine for the time being not only because he used to have gout and high uric acid before it is also the need for him to continue prophylaxis for tumor lysis even though doubt that this will be happening with the volume of tumor that is leftover.      Pet after 4th cycle near complete response, rec to continue and complete 6 cycles and recheck pet 2 mo after completion of all  "chemo; plans for cardio oncology referral and survivorship referral in the future    Family History   Problem Relation Age of Onset   • Rheum arthritis Brother                     Objective     Vitals:    02/10/21 0809   BP: 128/81   Pulse: 87   Resp: 20   Temp: 97.9 °F (36.6 °C)   TempSrc: Temporal   SpO2: 98%   Weight: 117 kg (258 lb 12.8 oz)   Height: 193 cm (75.98\")   PainSc: 0-No pain     Current Status 2/10/2021   ECOG score 0       Physical Exam           I HAVE PERSONALLY REVIEWED THE HISTORY OF THE PRESENT ILLNESS, PAST MEDICAL HISTORY, FAMILY HISTORY, SOCIAL HISTORY, ALLERGIES, MEDICATIONS STATED ABOVE IN THE OFFICE NOTE FROM TODAY.        GENERAL:  Well-developed, well-nourished  Patient  in no acute distress.   SKIN:  Warm, dry ,NO rashes,NO purpura ,NO petechiae.  HEENT:  Pupils were equal and reactive to light and accomodation, conjunctivae noninjected, no pterygium, normal extraocular movements, normal visual acuity.   NECK:  Supple with good range of motion; no thyromegaly or masses, no JVD or bruits, no cervical adenopathies.No carotid artery pain, no carotid abnormal pulsation , NO arterial dance.  LYMPHATICS:  No cervical, NO supraclavicular, NO axillary,NO epitrochlear , NO inguinal adenopathy.  CARDIAC   normal rate and regular rhythm, without murmur,NO rubs NO S3 NO S4 right or left . Normal femoral, popliteal, pedis, brachial and carotid pulses.  VASCULAR ARTERIAL: normal carotids,brachial,radial,femoral,popliteal, pedis pulses , no bruits.no paleness or cyanosis, no pain, no edema, no numbness, no gangrene.  VASCULAR VENOUS: no cyanosis, collateral circulation, varicosities, edema, palpable cords, pain, erythema.  ABDOMEN:  Soft, nontender with no hepatomegaly, no splenomegaly,, no ascites, no collateral circulation,no distention,no Jackson sign, no abdominal pain, no inguinal hernias,no umbilical hernia.He has a minimal residual mass in the left upper quadrant of the abdomen measuring 4 cm in " size, nodular, nonmobile, nontender with no fluctuation.          GENITAL: Not  Performed.  EXTREMITIES  AND SPINE:  No clubbing, cyanosis or edema, no deformities or pain .No kyphosis, scoliosis, deformities or pain in spine, ribs or pelvic bone.  NEUROLOGICAL:  Patient was awake, alert, oriented to time, person and place.                RECENT LABS:        Hematology WBC   Date Value Ref Range Status   02/10/2021 4.35 3.40 - 10.80 10*3/mm3 Final     RBC   Date Value Ref Range Status   02/10/2021 3.60 (L) 4.14 - 5.80 10*6/mm3 Final     Hemoglobin   Date Value Ref Range Status   02/10/2021 11.5 (L) 13.0 - 17.7 g/dL Final     Hematocrit   Date Value Ref Range Status   02/10/2021 34.2 (L) 37.5 - 51.0 % Final     Platelets   Date Value Ref Range Status   02/10/2021 301 140 - 450 10*3/mm3 Final       CBC:    WBC   Date Value Ref Range Status   02/10/2021 4.35 3.40 - 10.80 10*3/mm3 Final     RBC   Date Value Ref Range Status   02/10/2021 3.60 (L) 4.14 - 5.80 10*6/mm3 Final     Hemoglobin   Date Value Ref Range Status   02/10/2021 11.5 (L) 13.0 - 17.7 g/dL Final     Hematocrit   Date Value Ref Range Status   02/10/2021 34.2 (L) 37.5 - 51.0 % Final     MCV   Date Value Ref Range Status   02/10/2021 95.0 79.0 - 97.0 fL Final     MCH   Date Value Ref Range Status   02/10/2021 31.9 26.6 - 33.0 pg Final     MCHC   Date Value Ref Range Status   02/10/2021 33.6 31.5 - 35.7 g/dL Final     RDW   Date Value Ref Range Status   02/10/2021 18.0 (H) 12.3 - 15.4 % Final     RDW-SD   Date Value Ref Range Status   02/10/2021 62.1 (H) 37.0 - 54.0 fl Final     MPV   Date Value Ref Range Status   02/10/2021 9.4 6.0 - 12.0 fL Final     Platelets   Date Value Ref Range Status   02/10/2021 301 140 - 450 10*3/mm3 Final     Neutrophil %   Date Value Ref Range Status   02/10/2021 80.3 (H) 42.7 - 76.0 % Final     Lymphocyte %   Date Value Ref Range Status   02/10/2021 5.3 (L) 19.6 - 45.3 % Final     Monocyte %   Date Value Ref Range Status    02/10/2021 12.6 (H) 5.0 - 12.0 % Final     Eosinophil %   Date Value Ref Range Status   02/10/2021 0.2 (L) 0.3 - 6.2 % Final     Basophil %   Date Value Ref Range Status   02/10/2021 1.1 0.0 - 1.5 % Final     Immature Grans %   Date Value Ref Range Status   02/10/2021 0.5 0.0 - 0.5 % Final     Neutrophils, Absolute   Date Value Ref Range Status   02/10/2021 3.49 1.70 - 7.00 10*3/mm3 Final     Lymphocytes, Absolute   Date Value Ref Range Status   02/10/2021 0.23 (L) 0.70 - 3.10 10*3/mm3 Final     Monocytes, Absolute   Date Value Ref Range Status   02/10/2021 0.55 0.10 - 0.90 10*3/mm3 Final     Eosinophils, Absolute   Date Value Ref Range Status   02/10/2021 0.01 0.00 - 0.40 10*3/mm3 Final     Basophils, Absolute   Date Value Ref Range Status   02/10/2021 0.05 0.00 - 0.20 10*3/mm3 Final     Immature Grans, Absolute   Date Value Ref Range Status   02/10/2021 0.02 0.00 - 0.05 10*3/mm3 Final     nRBC   Date Value Ref Range Status   02/10/2021 0.0 0.0 - 0.2 /100 WBC Final        CMP:    Glucose   Date Value Ref Range Status   02/03/2021 85 74 - 124 mg/dL Final     BUN   Date Value Ref Range Status   02/03/2021 18 6 - 20 mg/dL Final     Creatinine   Date Value Ref Range Status   02/03/2021 1.21 0.70 - 1.30 mg/dL Final   12/23/2020 1.30 0.60 - 1.30 mg/dL Final     Comment:     Serial Number: [id number]:  [id number]     Sodium   Date Value Ref Range Status   02/03/2021 142 134 - 145 mmol/L Final     Potassium   Date Value Ref Range Status   02/03/2021 4.0 3.5 - 4.7 mmol/L Final     Chloride   Date Value Ref Range Status   02/03/2021 100 98 - 107 mmol/L Final     CO2   Date Value Ref Range Status   02/03/2021 29.8 (H) 22.0 - 29.0 mmol/L Final     Calcium   Date Value Ref Range Status   02/03/2021 9.9 8.5 - 10.2 mg/dL Final     Total Protein   Date Value Ref Range Status   02/03/2021 7.0 6.3 - 8.0 g/dL Final     Albumin   Date Value Ref Range Status   02/03/2021 4.40 3.50 - 5.20 g/dL Final     ALT (SGPT)   Date Value Ref  Range Status   02/03/2021 21 0 - 41 U/L Final     AST (SGOT)   Date Value Ref Range Status   02/03/2021 21 0 - 40 U/L Final     Alkaline Phosphatase   Date Value Ref Range Status   02/03/2021 67 38 - 116 U/L Final     Total Bilirubin   Date Value Ref Range Status   02/03/2021 0.2 0.2 - 1.2 mg/dL Final     eGFR  Am   Date Value Ref Range Status   02/24/2020 75 >60 mL/min/1.73 Final     Globulin   Date Value Ref Range Status   02/03/2021 2.6 1.8 - 3.5 gm/dL Final     A/G Ratio   Date Value Ref Range Status   02/03/2021 1.7 1.1 - 2.4 g/dL Final     BUN/Creatinine Ratio   Date Value Ref Range Status   02/03/2021 14.9 7.3 - 30.0 Final     Anion Gap   Date Value Ref Range Status   02/03/2021 12.2 5.0 - 15.0 mmol/L Final              F-18 FDG PET FROM SKULL BASE TO MID THIGH WITH PET/CT FUSION     HISTORY: 60-year-old male with follicular lymphoma. Restaging.     TECHNIQUE: Radiation dose reduction techniques were utilized, including  automated exposure control and exposure modulation based on body size.   Blood glucose level at time of injection was 109 mg/dL.  5.8 mCi of F-18  FDG were injected and PET was performed from skull base to mid thigh. CT  was obtained for localization and attenuation correction. Time at  injection 8:31 AM. PET start time 10:06 AM. Compared with CTs from  12/23/2020.     FINDINGS: There has been marked decrease in the size of the bulky  mesenteric and retroperitoneal lymphadenopathy since the previous  PET/CT. The residual mesenteric mass on the left measures approximately  9.0 x 7.2 cm with a maximal SUV of 8.3. The bilateral retroperitoneal  lymphadenopathy and the lymphadenopathy at the marylu hepatis and  retrodiaphragmatic regions has either resolved or nearly completely  resolved. The hypermetabolic omental nodularity and hypermetabolic  activity at the thickened peritoneum at the dependent aspect of the  pelvis has resolved. There has been resolution of the hypermetabolic  right  epicardial, posterior mediastinal, bilateral internal mammary, and  right supraclavicular hypermetabolic lymphadenopathy. Attenuation  correction artifact is noted at the right hilum where there are bulky  calcified nodes. There is no new or suspicious hypermetabolic activity  within the chest and there is no suspicious hypermetabolic activity  within the neck. There is a diffuse increase in marrow activity. Splenic  size remains normal and splenic activity is less intense than that of  the liver.     IMPRESSION:  1. Dramatic improvement. There has been marked decrease in the size and  intensity of metabolic activity of the mesenteric and retroperitoneal  bulky lymphadenopathy. There has been resolution of hypermetabolic  lymphadenopathy and metastatic disease at multiple regions of the  abdomen and pelvis. The hypermetabolic lymphadenopathy within the chest  and right supraclavicular region has resolved. There is no evidence for  new metastatic disease.  2. Diffuse increase in marrow activity is likely chemotherapy related.     This report was finalized on 2/4/2021 9:10 AM by Dr. Galilea Batista M.D.           Assessment/plan:         In summary I have seen a very healthy 60-year-old individual who is not a smoker or a drinker who has hypertension and history of hyperlipidemia as well as previous history of hyperuricemia and gout who has been seen by primary Nurse Practitioner, Mr. James Epley, for routine assessment and was found to have a very large abdominal mass. A CT was performed, I have reviewed the CT scan in the PAC system . In deed there is a very large mass independent of the kidney, pancreas, spleen, stomach and intestine. This mass is probably representation of conglomerate of lymph nodes and probably represents a non-Hodgkin's lymphoma. The patient so far has no major symptoms besides early satiety. He tried to lose weight since last fall and he lost 25 pounds of weight until the  pandemia in March and April. Since then his weight has stabilized. The rest of his clinical exam discloses no cervical, axillary or inguinal adenopathies. Again he has no obvious B symptoms.       I reviewed the patient back on 2020 with his wife in order to review the PET scan and the pathology report of the abdominal mass. 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 The Ki-67 tumor is 30%.       I reviewed the patient back along with his wife on 2021. He has not encountered any side effects of the previous cycle of chemotherapy with the exception of neutropenia and no fever that recovered quickly. He also has developed some anemia associated with chemotherapy that has not had any implications and actually the hemoglobin today is better at 11. The patient has not developed any sensory neuropathy in his fingers or toes associated with vincristine use. His weight has remained stable. His appetite is good. He has no cancer related pain and he has not developed any issues in regard uric acid accumulation.       I discussed with the patient and his wife on 02/10/2021 and reviewed in the PAC system [facility] the very dramatic response of his tumor to chemotherapy. His near complete resolution of all of the sites of disease in the chest, pelvis, intraperitoneal disease and the very large mass is almost 97% gone. There is minimal persistent activity in the central aspect of the mass and that is what I feel on the physical examination.     The patient has no symptoms pertinent to this anymore, there are no B symptoms. He has an ECOG performance status of 0, he has no pain and he has not developed any fever or infections. His white count, hemoglobin and platelets remain normal. His chemistry profile remains normal.     I advised the patient the followin. Proceed with 5th cycle of chemotherapy  today and remind him to take his prednisone for the next 5 days.  2. Proceed with Neulasta On-body today and he will require monitoring of blood counts in 1 and 2 weeks.   3. Review him back in 3 weeks to continue his plan of chemotherapy exactly the same until completion of total of 6 cycles.   4. Plan in the future for survivorship referral.  5. Plan in the future for cardio-oncology referral and I explained to him and his wife the need for this given the fact that he has received adriamycin. He has been taking metoprolol all along for cardiac protection and also he takes olmesartan that should be also cardioprotective to him.   6. Discussed with him the need for him to take an aspirin a day and also I advised him to continue taking his vitamin D 1000 units everyday to try to minimize any form of recurrence.   7. Remind him I would like to keep the port for 2 years at least and flush every 6 weeks.   8. I do not recommend any other form of maintenance therapy for him at this point.  9. I discussed with him the need to proceed with another PET scan more or less in 3 months from now to reassure him complete response.    I discussed all of these facts with the patient and his wife present in the room. He was ready to proceed.     THIS IS AN  ILLNESS THAT POSES A THREAT TO LIFE AND BODY FUNCTION, REQUIRED REVIEW OF EXTERNAL DOCUMENT, REQUIRED INDEPENDENT HISTORIAN, AND REQUIRES INTENSIVE MONITORING OF A LAB TEST, A PHYSIOLOGIC TEST OR IMAGING FOR DIAGNOSIS THERAPY AND  TOXICITY.      I DISCUSSED WITH PATIENT IN DETAIL FORMS TO DECREASE CHANCES OF CORONAVIRUS INFECTION INCLUDING ISOLATION, PROPER HAND HIGIENE, AVOID PUBLIC PLACES  WITH CROWDS, FOLLOW  CDC RECOMENDATIONS, AND KEEP PERSONAL AND SOCIAL RESPONSIBILITY, WARE A MASK IN PUBLIC PLACES.  PATIENT IS AWARE THIS INFECTION COULD HAVE SEVERE CONSEQUENCES TO PERSONAL HEALTH AND FAMILY RAMIFICATIONS OF THIS.

## 2021-02-12 NOTE — TELEPHONE ENCOUNTER
Attempted to contact patient in regards to his apts for labs he is requesting. Patient states he gets labs done every Wednesday before and after tx. I can not find this in his plan, labs or apt history. Left vm to call my direct line back.

## 2021-02-12 NOTE — TELEPHONE ENCOUNTER
Patient had chemotherapy on 2/10.  He normally has labs the following Wednesday, and then the Wednesday before the next chemotherapy. He doesn't have labs scheduled.      Please call him to schedule.  [phone]

## 2021-02-15 NOTE — TELEPHONE ENCOUNTER
Patient called me back to ask about weekly labs. After reviewing Dr. Rodriges note, he asked for weekly cbc. Patient last had this done on 2/10. Asked schedule desk to set up cbc and rn review for 2/17 and 2/24. Patient has apt on 3/3.

## 2021-02-17 NOTE — NURSING NOTE
Lab Results   Component Value Date    WBC 0.59 (C) 02/17/2021    HGB 11.1 (L) 02/17/2021    HCT 31.8 (L) 02/17/2021    MCV 92.7 02/17/2021     (L) 02/17/2021   Anc 0.39    Pt is here for lab with RN review. Received cycle 5 of RCHOP on 2/10/2021.  CBC reviewed with pt, counts are stable except for low WBC. He states that his counts drop after chemo, but recover on their own.  Pt has no complaints except for fatigue. Denies fever of signs of infection. Temp 98.1 Reviewed infection precautions and was instructed to call for fever of 100.4 or signs of infection.  Copy of labs given to pt and f/u appt reviewed. Pt is instructed to call the office with any concerns or new symptoms prior to next visit. Adrien vu

## 2021-02-24 NOTE — NURSING NOTE
Pt here for CBC with RN review. Pt counts have significantly improved and are stable at this time. Pt denies any signs of infection and has no c/o. Copy of labs given to pt. Pt instructed to call clinic with any concerns. Pt v/u.         Lab Results   Component Value Date    WBC 5.59 02/24/2021    HGB 11.1 (L) 02/24/2021    HCT 31.3 (L) 02/24/2021    MCV 95.1 02/24/2021     02/24/2021

## 2021-03-03 NOTE — PROGRESS NOTES
Subjective     REASON FOR FOLLOW UP:  Large abdominal mass most likely lymphoma: biopsy shows grade 3 follicular lymphoma stage IV by PET scan    HISTORY OF PRESENT ILLNESS:    The patient is a 60 y.o. male with the above-mentioned history, who returns the office today in anticipation of his sixth and final cycle of chemotherapy with CHOP Rituxan.  He continues to tolerate treatment remarkably well.  He denies nausea or vomiting.  He does have some constipation which is controlled with over-the-counter stool softeners as needed.  He denies fevers or chills.  He reports a single episode of tingling in his fingers following cycle 5 which spontaneously resolved.  He denies fevers or chills.  He denies any new pain.  He denies B symptoms including weight loss, appetite changes.  He denies difficulty swallowing.       ONCOLOGIC HISTORY:The patient is a 60 y.o. year old male who is here for an opinion about the above issue.  I had the opportunity to see this delightful individual in consultation along with his wife today in the office a 60-year-old white male who came to Mr. James Epley, APRN, at [facility] for routine assessment every 6 month visit and upon clinical examination he was found to have a very large abdominal mass. Obviously a CT scan was performed that documents a very large mass that is close to the stomach independent of the spleen, pancreas, left kidney and bowel and probably representing a conglomerate of masses and tumors that probably represents a non-Hodgkin's lymphoma. The patient states that he has had some early satiety in the last several months and he is not eating as much as he used to. He also has mild element of constipation, his bowel movement is harder than usual once or twice a day. He has not seen any passage of blood in the stool. He denies emphatically any abdominal pain, sensation of fullness and he denies any fever, chills. A few nights ago he had some night sweats but he  assumes it is because he had too many blankets on him. He has not had any pruritus. He does not feel fatigued. He denies any cough or sputum production, no shortness of breath, no pleuritic pain. He has not had any rashes in the skin. He denies any joint pain or bone pain. He denies any neurological symptomatology. He has no urinary complaints with no frequency, urgency or hematuria. He denies any other alterations at this time.     In Denise reviewed the patient back on 2020 with his wife in order to review the PET scan and the pathology report of the abdominal mass. 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 The Ki-67 tumor is 30%.     I reviewed with the patient and his wife the PET scan. This is very dramatic in regard to the SUV activity uptake in the large abdominal mass and also documents retroperitoneal adenopathy, compromise of the peritoneum and also compromise of the internal mammary nodes. There is right supraclavicular adenopathy. The PET scan is more than striking. eparation for definitive chemotherapy with CHOP and Rituxan I proposed to the patient and his wife the followin. He will need to have an echocardiogram before initiation of chemotherapy. I explained to him the reason behind that.   2. I want for him to remain on metoprolol that will be cardiac protection for the time being.   3. I would like for the patient to modify his dose of allopurinol to 300 mg a day starting as per today to prevent tumor lysis syndrome.  4. The patient will be educated about CHOP and Rituxan in the next day or so. I discussed with him side effects of the medicines including anemia, leukopenia, thrombocytopenia, peripheral neuropathy, allergic reactions, fever and chills related to Rituxan administration.   5. I discussed with them the length of the 1st treatment that will require probably 6-7 hours in the  office.  6. The patient has high risk of tumor lysis syndrome and for that reason he will come on day 2 and day 3 to receive 1000 cc of normal saline each one of those days and he will require BMP, uric acid, phosphorus and LDH each one of those days.   7. The patient will require Vascular Surgery consultation to proceed with port placement.   8. I indicated how a port is placed and I showed him the device.   9. The patient will be supported by Neulasta On-body and I explained to him how this device works and the side effects of Neulasta including bone pain that could be bad enough to require pain medicine.   10. I indicated to the patient that he will require chemotherapy treatment every 3 weeks for a total of 6 cycles and he will repeat a PET scan after cycle 2 and after cycle 6. He will have a CT scan after cycle 4.     The patient was further reviewed on 12/29/2020. He has completed already 2 cycles of chemotherapy. We scheduled him to have a PET scan that was not approved by his insurance and instead of this they approved a CT scan. I have reviewed the CT scan with the patient today and his wife. My personal interpretation shows in my opinion some disagreement with the radiologist’s interpretation. The radiologist only measured the mass in only 1 area. I measured the mass in volume and it is dramatically different. He has had probably a 75% reduction in the total volume of tumor in the left upper quadrant of his abdomen. We compared the tumor at different levels and different locations and different views. This is more obvious to me. He has normal bowel activity otherwise. His gallbladder, liver and spleen remain normal. Pancreas and kidneys remain normal. 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 In the chest he had resolution of both his sites of disease if we compare this with the PET scan that was done at the time of the diagnosis.     I do not believe that the patient  "has encountered any significant side effects of the treatment with the exception of minimal anemia documented today with a hemoglobin of 11.6. His white count and his platelet count are normal. His chemistry profile is pending. The patient remains on allopurinol and he will stay on this medicine for the time being not only because he used to have gout and high uric acid before it is also the need for him to continue prophylaxis for tumor lysis even though doubt that this will be happening with the volume of tumor that is leftover.      Pet after 4th cycle near complete response, rec to continue and complete 6 cycles and recheck pet 2 mo after completion of all chemo; plans for cardio oncology referral and survivorship referral in the future    Family History   Problem Relation Age of Onset   • Rheum arthritis Brother         Objective     Vitals:    03/03/21 0755   BP: 142/82   Pulse: 90   Resp: 16   Temp: 97.1 °F (36.2 °C)   SpO2: 96%   Weight: 118 kg (261 lb 1.6 oz)   Height: 193 cm (75.98\")   PainSc: 0-No pain     Current Status 3/3/2021   ECOG score 0       Physical Exam   GENERAL:  Well-developed, well-nourished in no acute distress.   SKIN:  Warm, dry without rashes, purpura or petechiae.  HEAD:  Normocephalic.  EYES:  Pupils equal, round.  EOMs intact.  Conjunctivae normal.  EARS:  Hearing intact.  LYMPHATICS:  No cervical, supraclavicular adenopathy.  CHEST:  Lungs clear to auscultation. Good airflow.  CARDIAC:  Regular rate and rhythm without murmurs. Normal S1,S2.  ABDOMEN:  Soft, nontender. Bowel sounds present  EXTREMITIES:  No clubbing, cyanosis or edema.  NEUROLOGICAL: No focal neurological deficits.  PSYCHIATRIC:  Normal affect and mood.    I have reexamined the patient and the results are consistent with the previously documented exam. NASREEN Thomas       RECENT LABS:  Results from last 7 days   Lab Units 03/03/21  0735 02/24/21  1525   WBC 10*3/mm3 3.45 5.59   NEUTROS ABS 10*3/mm3 " 2.49 4.49   HEMOGLOBIN g/dL 11.7* 11.1*   HEMATOCRIT % 34.5* 31.3*   PLATELETS 10*3/mm3 292 237     Results from last 7 days   Lab Units 03/03/21  0735   SODIUM mmol/L 143   POTASSIUM mmol/L 4.0   CHLORIDE mmol/L 102   CO2 mmol/L 29.5*   BUN mg/dL 20   CREATININE mg/dL 1.12   CALCIUM mg/dL 9.6   ALBUMIN g/dL 4.50   BILIRUBIN mg/dL 0.3   ALK PHOS U/L 53   ALT (SGPT) U/L 16   AST (SGOT) U/L 22   GLUCOSE mg/dL 118           Assessment/plan:    1.  Grade 3 follicular lymphoma, stage IV  · Originally seen in consultation 10/29/2020 after CT scan revealed a large abdominal mass.  Clinical exam with no adenopathy, no B symptoms  · CT-guided biopsy 11/3/2020 with pathology documenting follicular lymphoma, grade 3A, c-MYC negative, BCL2 negative, BCL6 negative. The Ki-67 tumor is 30%.   · PET scan 11/5/2020 with intensely hypermetabolic bulky left mesenteric mass.  There is also metabolic lymphadenopathy in the retroperitoneal, retrodiaphragmatic, epicardial fat pad and right supraclavicular region.  · Baseline echocardiogram 11/12/2020 with ejection fraction of 56.9%  · Mediport placed 11/16/2020  · Cycle 1 CHOP Rituxan 11/17/2020  · CT scans following 2 cycles of CHOP Rituxan with response and interval decrease in the size of the large mesenteric mass  · PET scan 2/3/2021 following 4 cycles CHOP Rituxan with near complete resolution of all sites of disease.  Minimal persistent activity at the central aspect of the  · Proceed today with final planned cycle of chemotherapy    2.  Comorbidities including hypertension, history of hyperlipidemia, history of gout and hyperuricemia.    3.  Cardiac prophylaxis  · Based on echocardiogram 11/12/2020 with ejection fraction of 56.9%  · With completion of Adriamycin, the patient will follow up with cardio oncology clinic for cardiac monitoring as appropriate    PLAN:  1. Proceed today with cycle 6 CHOP Rituxan  2. Continue Neulasta on body injector  3. Return in 1 week in 3 weeks for  CBC with nurse review to ensure his counts adequately recover from final cycle of chemotherapy  4. Refer to survivorship clinic  5. Refer to cardio-oncology clinic, for follow-up after the completion of Adriamycin.  He does continue on metoprolol and olmesartan.  6. The patient was encouraged to wait 4 weeks prior to receiving COVID-19 vaccine to allow adequate recovery from chemotherapy  7. Continue to flush Mediport every 6 weeks  8. Continue vitamin D 1000 units daily  9. The patient will not require any form of maintenance therapy  10. In 8 weeks, he will undergo a PET scan to evaluate complete response to 6 cycles of CHOP Rituxan  11. MD follow-up with Dr. Rodriges in 9 weeks for review of PET scan    Patient continues on high risk medication requiring close monitoring for toxicity    Lucita William, APRN  03/03/2021

## 2021-03-11 NOTE — NURSING NOTE
Lab Results   Component Value Date    WBC 0.44 (C) 03/11/2021    HGB 10.5 (L) 03/11/2021    HCT 30.1 (L) 03/11/2021    MCV 93.2 03/11/2021     (L) 03/11/2021     ANC 0.14    Pt had 6th and final cycle of CHOP-R last week.  Denies complaints today.  No fever or s/s of infection.  Temp today 97.1    Pt has not been on prophylactic abx during the course of his chemo treatments.      Reviewed labs with NASREEN Landrum (she saw pt in office visit last week).  Orders given for levaquin 500mg po daily x 7 days and ok to return 3/24/21 as scheduled for CBC with RN review.      Discussed neutropenic precautions. Copy of labs given. Written info about levaquin and when to call the office also given.      Pt will call with any questions or concerns before his next visit.

## 2021-03-16 NOTE — PROGRESS NOTES
Date of Office Visit: 2021  Encounter Provider: Lupis Fernandez MD  Place of Service: [facility] CARDIOLOGY  Patient Name: Jake Saunders  :1960    Chief complaint  Consult requested by NASREEN Luna for the oncology evaluation and complaints of fatigue, lightheadedness.    History of Present Illness  Patient is a 60-year-old gentleman history of hypertension, hyperlipidemia recently diagnosed with grade 3 follicular lymphoma .  He has been on R-CHOP therapy.  Echo in  showed an ejection fraction 57% with poor image quality therefore strain imaging could not be performed.  Doppler assessment assistant with normal valve function the valves were not well imaged.  Subsequent CT scan after initiation of chemotherapy has shown significant improvement in omental,.  Nail thickening and a large mesenteric mass.  Along with improvement and the supraclavicular and internal internal mammary mass.  He received total of 302 mg/m² of doxorubicin.    He has had no chest pain, shortness of breath, palpitations syncope near syncope.  He is walking 3 times a week and achieves 120 minutes.  He has had dizziness recently when he stands up.  He has not been checking his blood pressure consistently at home..  Blood work includes normal CMP on 3/3/2020 white count on 311 was 0.44 hemoglobin 10.5, platelet count 108    Past Medical History:   Diagnosis Date   • Allergic rhinitis    • Gout    • Grade 3 follicular lymphoma of lymph nodes of multiple regions (CMS/HCC)    • H/O foreign travel 2020    [facility]   • Hyperlipidemia    • Hypertension      Past Surgical History:   Procedure Laterality Date   • COLONOSCOPY      normal   • TONSILLECTOMY     • VENOUS ACCESS DEVICE (PORT) INSERTION N/A 2020    Procedure: mediport placement;  Surgeon: Caitlin Bethea Jr., MD;  Location: [facility] OR;  Service: Vascular;  Laterality: N/A;     Outpatient Medications Prior to  Visit   Medication Sig Dispense Refill   • allopurinol (ZYLOPRIM) 300 MG tablet TAKE 1 TABLET BY MOUTH EVERY DAY 90 tablet 3   • cetirizine (zyrTEC) 10 MG tablet Take 1 tablet by mouth Daily. 90 tablet 1   • HYDROcodone-acetaminophen (NORCO) 5-325 MG per tablet Take 1 tablet by mouth Every 6 (Six) Hours As Needed for Moderate Pain . 30 tablet 0   • levoFLOXacin (Levaquin) 500 MG tablet Take 1 tablet by mouth Daily. 7 tablet 0   • metoprolol succinate XL (TOPROL-XL) 50 MG 24 hr tablet TAKE 1 TABLET BY MOUTH DAILY 90 tablet 1   • multivitamin with minerals (MULTIVITAMIN ADULT PO) Take 1 tablet by mouth Daily.     • Olmesartan-amLODIPine-HCTZ 40-5-25 MG tablet Take 1 tablet by mouth Daily. 90 tablet 1   • ondansetron (ZOFRAN) 8 MG tablet Take 1 tablet by mouth Every 8 (Eight) Hours As Needed for Nausea or Vomiting. 30 tablet 5   • Vitamin D, Cholecalciferol, 25 MCG (1000 UT) capsule Take  by mouth Daily.     • allopurinol (ZYLOPRIM) 100 MG tablet TAKE 1 TABLET BY MOUTH EVERY DAY 90 tablet 1   • mupirocin (Bactroban) 2 % ointment Apply  topically to the appropriate area as directed 3 (Three) Times a Day. 15 g 1   • predniSONE (DELTASONE) 50 MG tablet Take it every 3 weeks along with chemotherapy iv medication and for 5 days 10 tablet 5     No facility-administered medications prior to visit.       Allergies as of 03/16/2021   • (No Known Allergies)     Social History     Socioeconomic History   • Marital status:      Spouse name: Aubrie   • Number of children: 2   • Years of education: College   • Highest education level: Not on file   Tobacco Use   • Smoking status: Never Smoker   • Smokeless tobacco: Never Used   Substance and Sexual Activity   • Alcohol use: Yes     Alcohol/week: 6.0 - 8.0 standard drinks     Types: 6 - 8 Cans of beer per week     Comment: occ/  No caffeine use   • Drug use: No   • Sexual activity: Defer     Family History   Problem Relation Age of Onset   • Rheum arthritis Brother    • Stroke  "Maternal Grandmother    • Cancer Paternal Grandmother      Review of Systems   Constitutional: Negative for chills, fever, weight gain and weight loss.   Cardiovascular: Negative for leg swelling.   Respiratory: Negative for cough, snoring and wheezing.    Hematologic/Lymphatic: Negative for bleeding problem. Does not bruise/bleed easily.   Skin: Negative for color change.   Musculoskeletal: Negative for falls, joint pain and myalgias.   Gastrointestinal: Negative for melena.   Genitourinary: Negative for hematuria.   Neurological: Negative for excessive daytime sleepiness.   Psychiatric/Behavioral: Negative for depression. The patient is not nervous/anxious.         Objective:     Vitals:    03/16/21 0809 03/16/21 0810   BP: 100/74 100/74   BP Location: Right arm Left arm   Pulse: 93    Weight: 119 kg (262 lb)    Height: 193 cm (76\")      Body mass index is 31.89 kg/m².    Vitals reviewed.   Constitutional:       Appearance: Well-developed.      Comments: Obese   Eyes:      General: No scleral icterus.        Right eye: No discharge.      Conjunctiva/sclera: Conjunctivae normal.      Pupils: Pupils are equal, round, and reactive to light.   HENT:      Head: Normocephalic.      Nose: Nose normal.   Neck:      Thyroid: No thyromegaly.      Vascular: No JVD.   Pulmonary:      Effort: Pulmonary effort is normal. No respiratory distress.      Breath sounds: Normal breath sounds. No wheezing. No rales.   Cardiovascular:      Normal rate. Regular rhythm. Normal S1. Normal S2.      Murmurs: There is no murmur.      No gallop.   Pulses:     Intact distal pulses.   Edema:     Peripheral edema absent.   Abdominal:      General: Bowel sounds are normal. There is no distension.      Palpations: Abdomen is soft.      Tenderness: There is no abdominal tenderness. There is no rebound.   Musculoskeletal: Normal range of motion.         General: No tenderness.      Cervical back: Normal range of motion and neck supple. Skin:     " General: Skin is warm and dry.      Findings: No erythema or rash.   Neurological:      Mental Status: Alert and oriented to person, place, and time.   Psychiatric:         Behavior: Behavior normal.         Thought Content: Thought content normal.         Judgment: Judgment normal.       Lab Review:     ECG 12 Lead    Date/Time: 3/16/2021 8:10 AM  Performed by: Lupis Fernandez MD  Authorized by: Lupis Fernandez MD   Comparison: compared with previous ECG   Similar to previous ECG  Comparison to previous ECG: Nonspecific ST-T wave changes have improved  Rhythm: sinus rhythm    Clinical impression: normal ECG          Assessment:       Diagnosis Plan   1. High risk medication use  ECG 12 Lead    Adult Transthoracic Echo Limited W/ Cont if Necessary Per Protocol   2. Essential hypertension       Plan:       1.  Cardio oncology care with exposure to doxorubicin (302 mg/m²).  We will check a follow-up echocardiogram in 2 weeks which will be approximately 4 weeks following completion of last round of chemotherapy.  He is to follow-up with Dr. Nance in April for additional recommendations.  We will follow up with him in our office in 1 year with repeat echocardiography.  Has no anginal symptoms at this time.  He will increase his activity and have instructed him to call with any heart failure or anginal symptoms which are outlined for him as well  2.  Follicular lymphoma.  Felt to be sponsored with R-CHOP.  To see Dr. Nance in 1 month.  3.  History of hypertension.  Suspect he is having some symptomatic hypotension.  Of asked him to check pressures at home.  If they confirm his pressures are low as they are today we will need to consider decreasing his regimen especially with the summer months.  4.  Dyslipidemia.  HDL slightly reduced.  He will increase his activities as he recovers from chemotherapy and anticipate this will improve  5.  Questionable sleep apnea.  He snores at night and his stop bang score was 4 putting him  in intermediate risk range.  Discussed options for further testing for sleep apnea at this time versus attempts at risk factor modification which she will pursue anyway and then further assessment if unable to modify symptoms.      I spent a total 60 minutes spent including reviewing records with 40 minutes of face to face time with this patient.       Your medication list          Accurate as of March 16, 2021 11:59 PM. If you have any questions, ask your nurse or doctor.            CHANGE how you take these medications      Instructions Last Dose Given Next Dose Due   allopurinol 300 MG tablet  Commonly known as: ZYLOPRIM  What changed: Another medication with the same name was removed. Continue taking this medication, and follow the directions you see here.  Changed by: Lupis Fernandez MD      TAKE 1 TABLET BY MOUTH EVERY DAY          CONTINUE taking these medications      Instructions Last Dose Given Next Dose Due   cetirizine 10 MG tablet  Commonly known as: zyrTEC      Take 1 tablet by mouth Daily.       HYDROcodone-acetaminophen 5-325 MG per tablet  Commonly known as: NORCO      Take 1 tablet by mouth Every 6 (Six) Hours As Needed for Moderate Pain .       levoFLOXacin 500 MG tablet  Commonly known as: Levaquin      Take 1 tablet by mouth Daily.       metoprolol succinate XL 50 MG 24 hr tablet  Commonly known as: TOPROL-XL      TAKE 1 TABLET BY MOUTH DAILY       multivitamin with minerals tablet tablet      Take 1 tablet by mouth Daily.       Olmesartan-amLODIPine-HCTZ 40-5-25 MG tablet      Take 1 tablet by mouth Daily.       ondansetron 8 MG tablet  Commonly known as: ZOFRAN      Take 1 tablet by mouth Every 8 (Eight) Hours As Needed for Nausea or Vomiting.       Vitamin D (Cholecalciferol) 25 MCG (1000 UT) capsule      Take  by mouth Daily.          STOP taking these medications    mupirocin 2 % ointment  Commonly known as: Bactroban  Stopped by: Lupis Fernandez MD        predniSONE 50 MG tablet  Commonly known  as: DELTASONE  Stopped by: Lupis Fernandez MD               Patient is no longer taking -.  I corrected the med list to reflect this.  I did not stop these medications.    Dictated utilizing Dragon dictation

## 2021-03-24 NOTE — PROGRESS NOTES
Pt here for RN review. Counts have recovered since chemo. Pt reports feeling well and has completed levaquin. Provided copy of labs. Pt v/u of future appts and understands to call with any concerns. Pt asked to call him if chemistries are worrisome.    Lab Results   Component Value Date    WBC 5.31 03/24/2021    HGB 11.8 (L) 03/24/2021    HCT 33.8 (L) 03/24/2021    MCV 98.0 (H) 03/24/2021     03/24/2021     No significant changes noted in pts chemistries.    Lab Results   Component Value Date    GLUCOSE 110 03/24/2021    BUN 26 (H) 03/24/2021    CREATININE 1.24 03/24/2021    EGFRIFNONA 60 (L) 03/24/2021    EGFRIFAFRI 75 02/24/2020    BCR 21.0 03/24/2021    K 3.8 03/24/2021    CO2 30.2 (H) 03/24/2021    CALCIUM 9.7 03/24/2021    PROTENTOTREF 7.8 10/29/2020    ALBUMIN 4.40 03/24/2021    LABIL2 1.3 10/29/2020    AST 24 03/24/2021    ALT 23 03/24/2021

## 2021-04-05 NOTE — TELEPHONE ENCOUNTER
Please let him know that the echocardiogram shows his heart is strong.  Appears to be unchanged following chemotherapy.  Please find out how is his blood pressure.

## 2021-04-05 NOTE — TELEPHONE ENCOUNTER
Patient notified of results and verbalized understanding    Re bp- he has not gotten a cuff to check at home.  I have advised he get one and start checking and let us know if his bp is consistently above 130/80  Echo Noe RN  Triage nurse

## 2021-04-16 NOTE — PATIENT INSTRUCTIONS
Discharge instructions follow-up oncology continue healthy choices and dietary changes in improved health,    Calories between 1800-and 2000 daily mostly vegetables chicken fish 64 ounces of water daily    Continue metoprolol 50 mg daily  Discontinue olmesartan-amlodipine-HCTZ    In place of this is olmesartan-HCTZ 20/12.51 p.o. daily  And amlodipine 2.5 mg daily    Basically I cut this pill by 50% and had to separate the dose    Continue healthy diet dietary changes  For the next 24 hours drink a little extra fluid and caution your risk of fall right now no climbing etc.    See me some blood pressure readings in about a week  If you have a spike of blood pressure okay to increase amlodipine back to 5 mg  Or call or emergency room as needed    Follow-up in 6 months otherwise  Labs prior to next visit  Fasting labs in a couple weeks    We will touch base through the email regarding your blood pressure needs in the next couple weeks

## 2021-04-30 NOTE — PROGRESS NOTES
Subjective     REASON FOR FOLLOW UP:  Large abdominal mass most likely lymphoma: biopsy shows grade 3 follicular lymphoma stage IV by PET scan    HISTORY OF PRESENT ILLNESS:   DURING THE VISIT WITH THE PATIENT TODAY , PATIENT HAD FACE MASK, I HAD PROPER PROTECTIVE EQUIPMENT, AND I DID HAND HYGIENE WITH SOAP AND WATER BEFORE AND AFTER THE VISIT.     This patient returns today to the office for follow up after he has completed 6 cycles of chemotherapy with CHOP/Rituxan in the background of follicular grade 3 lymphoma stage IV disease with very bulky mass in the abdomen and also disease in the mediastinum. He is here with a new PET scan for review. Physically he feels recovered after effect of the chemotherapy. He has not had any problems with fevers, chills, night sweats, pruritus. Appetite is back to normality, taste is great. He has no abdominal pain, distention, nausea, vomiting or jaundice. Normal bowel activity. Normal urination. No rash in the skin. His hair is starting to grow back. He has not had any cardiovascular or respiratory issues. No leftover sensory neuropathy in his feet or toes. No other new problems. His ECOG performance status is 0.              ONCOLOGIC HISTORY:The patient is a 60 y.o. year old male who is here for an opinion about the above issue.  I had the opportunity to see this delightful individual in consultation along with his wife today in the office a 60-year-old white male who came to Mr. James Epley, APRN, at [facility] for routine assessment every 6 month visit and upon clinical examination he was found to have a very large abdominal mass. Obviously a CT scan was performed that documents a very large mass that is close to the stomach independent of the spleen, pancreas, left kidney and bowel and probably representing a conglomerate of masses and tumors that probably represents a non-Hodgkin's lymphoma. The patient states that he has had some early satiety in the last several  months and he is not eating as much as he used to. He also has mild element of constipation, his bowel movement is harder than usual once or twice a day. He has not seen any passage of blood in the stool. He denies emphatically any abdominal pain, sensation of fullness and he denies any fever, chills. A few nights ago he had some night sweats but he assumes it is because he had too many blankets on him. He has not had any pruritus. He does not feel fatigued. He denies any cough or sputum production, no shortness of breath, no pleuritic pain. He has not had any rashes in the skin. He denies any joint pain or bone pain. He denies any neurological symptomatology. He has no urinary complaints with no frequency, urgency or hematuria. He denies any other alterations at this time.     In [place] reviewed the patient back on 2020 with his wife in order to review the PET scan and the pathology report of the abdominal mass. 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 The Ki-67 tumor is 30%.     I reviewed with the patient and his wife the PET scan. This is very dramatic in regard to the SUV activity uptake in the large abdominal mass and also documents retroperitoneal adenopathy, compromise of the peritoneum and also compromise of the internal mammary nodes. There is right supraclavicular adenopathy. The PET scan is more than striking. eparation for definitive chemotherapy with CHOP and Rituxan I proposed to the patient and his wife the followin. He will need to have an echocardiogram before initiation of chemotherapy. I explained to him the reason behind that.   2. I want for him to remain on metoprolol that will be cardiac protection for the time being.   3. I would like for the patient to modify his dose of allopurinol to 300 mg a day starting as per today to prevent tumor lysis syndrome.  4. The patient will be  educated about CHOP and Rituxan in the next day or so. I discussed with him side effects of the medicines including anemia, leukopenia, thrombocytopenia, peripheral neuropathy, allergic reactions, fever and chills related to Rituxan administration.   5. I discussed with them the length of the 1st treatment that will require probably 6-7 hours in the office.  6. The patient has high risk of tumor lysis syndrome and for that reason he will come on day 2 and day 3 to receive 1000 cc of normal saline each one of those days and he will require BMP, uric acid, phosphorus and LDH each one of those days.   7. The patient will require Vascular Surgery consultation to proceed with port placement.   8. I indicated how a port is placed and I showed him the device.   9. The patient will be supported by Neulasta On-body and I explained to him how this device works and the side effects of Neulasta including bone pain that could be bad enough to require pain medicine.   10. I indicated to the patient that he will require chemotherapy treatment every 3 weeks for a total of 6 cycles and he will repeat a PET scan after cycle 2 and after cycle 6. He will have a CT scan after cycle 4.     The patient was further reviewed on 12/29/2020. He has completed already 2 cycles of chemotherapy. We scheduled him to have a PET scan that was not approved by his insurance and instead of this they approved a CT scan. I have reviewed the CT scan with the patient today and his wife. My personal interpretation shows in my opinion some disagreement with the radiologist’s interpretation. The radiologist only measured the mass in only 1 area. I measured the mass in volume and it is dramatically different. He has had probably a 75% reduction in the total volume of tumor in the left upper quadrant of his abdomen. We compared the tumor at different levels and different locations and different views. This is more obvious to me. He has normal bowel activity  otherwise. His gallbladder, liver and spleen remain normal. Pancreas and kidneys remain normal. 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 In the chest he had resolution of both his sites of disease if we compare this with the PET scan that was done at the time of the diagnosis.     I do not believe that the patient has encountered any significant side effects of the treatment with the exception of minimal anemia documented today with a hemoglobin of 11.6. His white count and his platelet count are normal. His chemistry profile is pending. The patient remains on allopurinol and he will stay on this medicine for the time being not only because he used to have gout and high uric acid before it is also the need for him to continue prophylaxis for tumor lysis even though doubt that this will be happening with the volume of tumor that is leftover.      Pet after 4th cycle near complete response, rec to continue and complete 6 cycles and recheck pet 2 mo after completion of all chemo; plans for cardio oncology referral and survivorship referral in the future.  4/30/21: PET scan shows progressive lymphoma again at the primary site in the abdominal cavity with no palpable mass. No disease in the bones or mediastinum. ECOG performance status 0. The patient completed 6 cycles of CHOP/Rituxan with no need for dose adjustment. Given the circumstances of lymphoma regrowth he will be referred for further assessment to [facility]. Consideration will be to rebiopsy, do salvage chemotherapy and strong consideration for either bone marrow transplantation or CAR T-cell therapy.        Family History   Problem Relation Age of Onset   • Rheum arthritis Brother    • Stroke Maternal Grandmother    • Cancer Paternal Grandmother         Objective     Vitals:    04/30/21 0951   BP: 138/85   Pulse: 84   Resp: 20   Temp: 98.1 °F (36.7 °C)   TempSrc: Temporal   SpO2: 97%   Weight: 122 kg (269 lb  "9.6 oz)   Height: 193 cm (75.98\")   PainSc: 0-No pain     Current Status 4/30/2021   ECOG score 0       Physical Exam   I HAVE PERSONALLY REVIEWED THE HISTORY OF THE PRESENT ILLNESS, PAST MEDICAL HISTORY, FAMILY HISTORY, SOCIAL HISTORY, ALLERGIES, MEDICATIONS STATED ABOVE IN THE OFFICE NOTE FROM TODAY.        GENERAL:  Well-developed, well-nourished  Patient  in no acute distress.   SKIN:  Warm, dry ,NO rashes,NO purpura ,NO petechiae.  HEENT:  Pupils were equal and reactive to light and accomodation, conjunctivae noninjected, no pterygium, normal extraocular movements, normal visual acuity.   NECK:  Supple with good range of motion; no thyromegaly or masses, no JVD or bruits, no cervical adenopathies.No carotid artery pain, no carotid abnormal pulsation , NO arterial dance.  LYMPHATICS:  No cervical, NO supraclavicular, NO axillary,NO epitrochlear , NO inguinal adenopathy.  CARDIAC   normal rate and regular rhythm, without murmur,NO rubs NO S3 NO S4 right or left . Normal femoral, popliteal, pedis, brachial and carotid pulses.  VASCULAR ARTERIAL: normal carotids,brachial,radial,femoral,popliteal, pedis pulses , no bruits.no paleness or cyanosis, no pain, no edema, no numbness, no gangrene.  VASCULAR VENOUS: no cyanosis, collateral circulation, varicosities, edema, palpable cords, pain, erythema.  ABDOMEN:  Soft, nontender with no hepatomegaly, no splenomegaly,no masses, no ascites, no collateral circulation,no distention,no Jackson sign, no abdominal pain, no inguinal hernias,no umbilical hernia, no abdominal bruits. Normal bowel sounds.  GENITAL: Not  Performed.  EXTREMITIES  AND SPINE:  No clubbing, cyanosis or edema, no deformities or pain .No kyphosis, scoliosis, deformities or pain in spine, ribs or pelvic bone.  NEUROLOGICAL:  Patient was awake, alert, oriented to time, person and place.        RECENT LABS:  Results from last 7 days   Lab Units 04/30/21  0938   WBC 10*3/mm3 4.98   NEUTROS ABS 10*3/mm3 4.03 "   HEMOGLOBIN g/dL 14.6   HEMATOCRIT % 41.6   PLATELETS 10*3/mm3 228       echo 3/30/21      · Calculated left ventricular EF = 56.3% Estimated left ventricular EF = 56% Estimated left ventricular EF was in agreement with the calculated left ventricular EF. Left ventricular systolic function is normal. Normal left ventricular cavity size and wall thickness noted. All left ventricular wall segments contract normally. Left ventricular diastolic function was normal.  · Trace tricuspid valve regurgitation is present. Estimated right ventricular systolic pressure from tricuspid regurgitation is normal (<35 mmHg). Calculated right ventricular systolic pressure from tricuspid regurgitation is 24 mmHg.   F-18 FDG PET FROM SKULL BASE TO MID THIGH WITH PET/CT FUSION     HISTORY: Lymphoma, restaging status post chemotherapy     TECHNIQUE: Radiation dose reduction techniques were utilized, including  automated exposure control and exposure modulation based on body size.   Blood glucose level at time of injection was 99 mg/dL.  8 mCi of F-18  FDG were injected and PET was performed from skull base to mid thigh. CT  was obtained for localization and attenuation correction. Time at  injection 0723. PET start time 0836.      Compared with PET/CT to 321 and CT chest, abdomen and pelvis 12/23/2020     FINDINGS:      There is no cervical adenopathy demonstrating FDG uptake significantly  above that of blood pool.     The thyroid, submandibular and parotid glands demonstrate roughly  symmetric FDG uptake. There is no hilar, mediastinal or axillary  adenopathy by size criteria. Right Port-A-Cath tip terminates in the  right atrium. There is no significant pericardial effusion.     Mild to moderate mosaic perfusion is present diffusely throughout the  lungs, as before. There is no pulmonary consolidation, pleural effusion  or pneumothorax. Sub-6 mm pulmonary nodules within the superior aspect  of the right lower lobe, right middle and  left upper lobes are grossly  unchanged since at least 12/23/2020 and below PET resolution.  Atelectasis and or pleural parenchyma scarring is present lingula, as  before.     No focal FDG avid abnormalities visualized within the liver,  gallbladder, pancreas, spleen, adrenal glands or kidneys. Hypodense  lesion within the right kidney measures less than 15 Hounsfield units in  density and is likely benign per Bosniak 2019 guidelines. There is no  hydronephrosis.     The large lymph node conglomerate situated within the left aspect of the  mid to lower abdomen is grossly unchanged in size since 02/03/2021,  measuring 8.3 x 7.7 cm (previously 9 x 7.2 cm). However, it has  increased in overall FDG uptake with a max SUV of 57.5 (previously up to  8.9). Ill-defined soft tissue thickening and fat stranding extends into  the adjacent mesentery, as before. No free intraperitoneal air is  visualized.     There are no suspicious FDG avid lytic or blastic osseous lesions. Mild  anterolisthesis of L4 and L5 is present.     IMPRESSION:  1.  Significant interval increase in the degree of FDG uptake within the  lymph node conglomerate within the left aspect of the abdomen which is  overall grossly similar in size and likely represents persistent  malignancy.  2.  No findings of FDG avid disease within the neck or chest.  3.  Sub-6 mm pulmonary nodules within the bilateral lungs, as before,  which remain below PET resolution and indeterminate. Continued attention  on follow-up is recommended.  4.  Mild to moderate mosaic perfusion within the bilateral lungs, as  before, most commonly seen in the setting of small airways disease.  Small vessel disease is a far less common etiology. Pulmonology  consultation would be helpful if patient is symptomatic.  5.  Other findings as above.     This report was finalized on 4/27/2021 9:11 AM by Dr. Wesley Hays M.D.         Assessment/plan:    1.  Grade 3 follicular lymphoma, stage  IV  · Originally seen in consultation 10/29/2020 after CT scan revealed a large abdominal mass.  Clinical exam with no adenopathy, no B symptoms  · CT-guided biopsy 11/3/2020 with pathology documenting follicular lymphoma, grade 3A, c-MYC negative, BCL2 negative, BCL6 negative. The Ki-67 tumor is 30%.   · PET scan 11/5/2020 with intensely hypermetabolic bulky left mesenteric mass.  There is also metabolic lymphadenopathy in the retroperitoneal, retrodiaphragmatic, epicardial fat pad and right supraclavicular region.  · Baseline echocardiogram 11/12/2020 with ejection fraction of 56.9%  · Mediport placed 11/16/2020  · Cycle 1 CHOP Rituxan 11/17/2020  · CT scans following 2 cycles of CHOP Rituxan with response and interval decrease in the size of the large mesenteric mass  · PET scan 2/3/2021 following 4 cycles CHOP Rituxan with near complete resolution of all sites of disease.  Minimal persistent activity at the central aspect of the  · Proceed  with final planned cycle of chemotherapy 3/3/21  The patient returned to the office on 04/30/2021. On clinical grounds he was completely asymptomatic in regard to lymphoma. He has recovered from the toxicities including the fatigue, the dysgeusia, the alopecia and so forth and he is fully functional with an ECOG performance status of 0. His comorbidities are completely quiet. This included hypertension, previous gout and previous history of hyperlipidemia.     I reviewed with the patient and his wife the PET scan in the PAC system [facility]. Unfortunately his tumor has regrowth in the site of the abdomen where the original very huge mass was located now measuring close to 6 cm in size and with an SUV activity that is sufficient to tell me that his lymphoma is regrowing. He has no disease in the liver or spleen. He has no disease in the chest or bones or any other site as far as I can tell. In fact his clinical examination today is completely negative or normal  with no palpable masses in the abdomen and no peripheral adenopathy or hepatosplenomegaly. He has no B symptoms.    The most recent echocardiogram on 03/30/2021 disclosed a normal left ventricular ejection fraction.    RECOMMENDATIONS:  1. I have advised the patient the fact that his lymphoma is regrowing and this is bad news only almost 6 weeks after completion of his last cycle of CHOP/Rituxan. Therefore I think we have to go through 2nd line therapies and this will include the reassessment for further chemotherapies, salvage chemotherapy for example and strong consideration for high dose chemotherapy, bone marrow transplantation or consideration for CAR T-cell therapy. I proposed for him to be seen at a secondary center institution like [facility], [place], operated by [facility] and I told him to not be surprised if he has a new biopsy of this tumor in this anatomical site that should be something that is doable in an easy fashion.     Also the patient will carry the disk with all of his CT scans and PET scans done through his therapy.     Hopefully with this assessment we will be able to get him going with a new modality of therapy that will give him still a chance for curability.    I frankly discussed this with the patient and his wife, we reviewed the PET scan in PAC system [facility] and appointment referral for an ongoing outside institution was made.     2. I would like to review the patient back in 3-4 weeks depending of where we are into his process after he is seen in [place] in the next week to 10 days hopefully.         Kelly May MA  04/30/2021

## 2021-05-18 NOTE — TELEPHONE ENCOUNTER
Spoke with wife to let her know that I wrote his jury duty note and it was in his MyChart. Wife asked about the biopsy and when it will be scheduled. When I was writing patients letter I saw a fax from [facility] from 5/13 requesting a biopsy as soon as possible. Dr. Rodriges denied anyone calling him to let him know this as well as myself. I have placed the order and told  desk.

## 2021-05-18 NOTE — TELEPHONE ENCOUNTER
Provider: SONNY  Caller: ABDI  Relationship to Patient: WIFE  Phone Number: [phone]    Reason for Call:  PT GOT A JURY DUTY NOTICE TO START June 21ST % SURE OF THE DATE, THEY ARE ASKING IF THERE IS A NOTE OR LETTER THAT YOU CAN GET FOR PT TO GET OUT OF IT DUE TO HIM MAY BE GETTING CHEMO, STEM CELL TREATMENT, OR IN [place]. THEY JUST DON'T KNOW WHAT OR WHERE PT WILL BE AT.      PLEASE CALL ABDI AT NUMBER ABOVE.    PT HAS AN APPT ON 5/21/21  IF YOU CAN GET A LETTER FOR HIM CAN YOU HAVE READY FOR HIM AT HIS APPT.

## 2021-05-21 NOTE — PROGRESS NOTES
Subjective     REASON FOR FOLLOW UP:  Large abdominal mass most likely lymphoma: biopsy shows grade 3 follicular lymphoma stage IV by PET scan    HISTORY OF PRESENT ILLNESS:   DURING THE VISIT WITH THE PATIENT TODAY , PATIENT HAD FACE MASK, I HAD PROPER PROTECTIVE EQUIPMENT, AND I DID HAND HYGIENE WITH SOAP AND WATER BEFORE AND AFTER THE VISIT.       This patient returns today to the office after he has been seen at [facility] in 2nd opinion after he has had early relapse of lymphoma in the abdomen. They have suggested a biopsy to be done here and then make the decision about RICE salvage chemotherapy. The patient at this time is feeling fine. He has no pain, fever, chills, sweats, pruritus, good energy, normal appetite, normal bowel function, normal urination. No other new symptoms.     He is already scheduled to have his tumor biopsy next Wednesday.              ONCOLOGIC HISTORY:The patient is a 60 y.o. year old male who is here for an opinion about the above issue.  I had the opportunity to see this delightful individual in consultation along with his wife today in the office a 60-year-old white male who came to Mr. James Epley, APRN, at [facility] for routine assessment every 6 month visit and upon clinical examination he was found to have a very large abdominal mass. Obviously a CT scan was performed that documents a very large mass that is close to the stomach independent of the spleen, pancreas, left kidney and bowel and probably representing a conglomerate of masses and tumors that probably represents a non-Hodgkin's lymphoma. The patient states that he has had some early satiety in the last several months and he is not eating as much as he used to. He also has mild element of constipation, his bowel movement is harder than usual once or twice a day. He has not seen any passage of blood in the stool. He denies emphatically any abdominal pain, sensation of fullness and he denies any fever,  chills. A few nights ago he had some night sweats but he assumes it is because he had too many blankets on him. He has not had any pruritus. He does not feel fatigued. He denies any cough or sputum production, no shortness of breath, no pleuritic pain. He has not had any rashes in the skin. He denies any joint pain or bone pain. He denies any neurological symptomatology. He has no urinary complaints with no frequency, urgency or hematuria. He denies any other alterations at this time.     In [place] reviewed the patient back on 2020 with his wife in order to review the PET scan and the pathology report of the abdominal mass. 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 The Ki-67 tumor is 30%.     I reviewed with the patient and his wife the PET scan. This is very dramatic in regard to the SUV activity uptake in the large abdominal mass and also documents retroperitoneal adenopathy, compromise of the peritoneum and also compromise of the internal mammary nodes. There is right supraclavicular adenopathy. The PET scan is more than striking. eparation for definitive chemotherapy with CHOP and Rituxan I proposed to the patient and his wife the followin. He will need to have an echocardiogram before initiation of chemotherapy. I explained to him the reason behind that.   2. I want for him to remain on metoprolol that will be cardiac protection for the time being.   3. I would like for the patient to modify his dose of allopurinol to 300 mg a day starting as per today to prevent tumor lysis syndrome.  4. The patient will be educated about CHOP and Rituxan in the next day or so. I discussed with him side effects of the medicines including anemia, leukopenia, thrombocytopenia, peripheral neuropathy, allergic reactions, fever and chills related to Rituxan administration.   5. I discussed with them the length of the 1st  treatment that will require probably 6-7 hours in the office.  6. The patient has high risk of tumor lysis syndrome and for that reason he will come on day 2 and day 3 to receive 1000 cc of normal saline each one of those days and he will require BMP, uric acid, phosphorus and LDH each one of those days.   7. The patient will require Vascular Surgery consultation to proceed with port placement.   8. I indicated how a port is placed and I showed him the device.   9. The patient will be supported by Neulasta On-body and I explained to him how this device works and the side effects of Neulasta including bone pain that could be bad enough to require pain medicine.   10. I indicated to the patient that he will require chemotherapy treatment every 3 weeks for a total of 6 cycles and he will repeat a PET scan after cycle 2 and after cycle 6. He will have a CT scan after cycle 4.     The patient was further reviewed on 12/29/2020. He has completed already 2 cycles of chemotherapy. We scheduled him to have a PET scan that was not approved by his insurance and instead of this they approved a CT scan. I have reviewed the CT scan with the patient today and his wife. My personal interpretation shows in my opinion some disagreement with the radiologist’s interpretation. The radiologist only measured the mass in only 1 area. I measured the mass in volume and it is dramatically different. He has had probably a 75% reduction in the total volume of tumor in the left upper quadrant of his abdomen. We compared the tumor at different levels and different locations and different views. This is more obvious to me. He has normal bowel activity otherwise. His gallbladder, liver and spleen remain normal. Pancreas and kidneys remain normal. 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 In the chest he had resolution of both his sites of disease if we compare this with the PET scan that was done at the time  "of the diagnosis.     I do not believe that the patient has encountered any significant side effects of the treatment with the exception of minimal anemia documented today with a hemoglobin of 11.6. His white count and his platelet count are normal. His chemistry profile is pending. The patient remains on allopurinol and he will stay on this medicine for the time being not only because he used to have gout and high uric acid before it is also the need for him to continue prophylaxis for tumor lysis even though doubt that this will be happening with the volume of tumor that is leftover.      Pet after 4th cycle near complete response, rec to continue and complete 6 cycles and recheck pet 2 mo after completion of all chemo; plans for cardio oncology referral and survivorship referral in the future.  4/30/21: PET scan shows progressive lymphoma again at the primary site in the abdominal cavity with no palpable mass. No disease in the bones or mediastinum. ECOG performance status 0. The patient completed 6 cycles of CHOP/Rituxan with no need for dose adjustment. Given the circumstances of lymphoma regrowth he will be referred for further assessment to [facility]. Consideration will be to rebiopsy, do salvage chemotherapy and strong consideration for either bone marrow transplantation or CAR T-cell therapy.        Family History   Problem Relation Age of Onset   • Rheum arthritis Brother    • Stroke Maternal Grandmother    • Cancer Paternal Grandmother         Objective     Vitals:    05/21/21 0847   BP: 131/87   Pulse: 68   Resp: 16   Temp: 97.5 °F (36.4 °C)   TempSrc: Temporal   SpO2: 98%   Weight: 121 kg (266 lb 9.6 oz)   Height: 193 cm (75.98\")   PainSc: 0-No pain     Current Status 5/21/2021   ECOG score 0       Physical Exam     I HAVE PERSONALLY REVIEWED THE HISTORY OF THE PRESENT ILLNESS, PAST MEDICAL HISTORY, FAMILY HISTORY, SOCIAL HISTORY, ALLERGIES, MEDICATIONS STATED ABOVE IN THE OFFICE NOTE FROM " TODAY.        GENERAL:  Well-developed, well-nourished  Patient  in no acute distress.   SKIN:  Warm, dry ,NO rashes,NO purpura ,NO petechiae.  HEENT:  Pupils were equal and reactive to light and accomodation, conjunctivae noninjected, no pterygium, normal extraocular movements, normal visual acuity.   NECK:  Supple with good range of motion; no thyromegaly or masses, no JVD or bruits, no cervical adenopathies.No carotid artery pain, no carotid abnormal pulsation , NO arterial dance.  LYMPHATICS:  No cervical, NO supraclavicular, NO axillary,NO epitrochlear , NO inguinal adenopathy.  CARDIAC   normal rate and regular rhythm, without murmur,NO rubs NO S3 NO S4 right or left .  VASCULAR VENOUS: no cyanosis, collateral circulation, varicosities, edema, palpable cords, pain, erythema.  ABDOMEN:  Soft, nontender with no hepatomegaly, no splenomegaly,luq 5 cm  mass, no ascites, no collateral circulation,no distention,no Jackson sign, no abdominal pain, no inguinal hernias,no umbilical hernia, no abdominal bruits. Normal bowel sounds.  GENITAL: Not  Performed.  EXTREMITIES  AND SPINE:  No clubbing, cyanosis or edema, no deformities or pain .No kyphosis, scoliosis, deformities or pain in spine, ribs or pelvic bone.  NEUROLOGICAL:  Patient was awake, alert, oriented to time, person and place.        RECENT LABS:  Results from last 7 days   Lab Units 05/21/21  0851   WBC 10*3/mm3 5.39   NEUTROS ABS 10*3/mm3 3.96   HEMOGLOBIN g/dL 14.4   HEMATOCRIT % 42.5   PLATELETS 10*3/mm3 209         [place] second opinion note:  The above-named has a very unique situation in which follicular lymphoma was treated with a large cell lymphoma regimen but is primary refractory. This overall presentation is highly suspicious that patient may have either transformed follicular lymphoma (transformed from follicular to large cell lymphoma) or he may have progression of his follicular lymphoma to double hit or other aggressive type of  lymphoma.  My recommendation is to obtain a biopsy of the lesion as soon as possible and I can see from patient's chart that the referring physician is already thinking along these lines. The biopsy will give specific clues with which additional lines of therapy can be recommended. If the biopsy does show follicular lymphoma or transformed follicular lymphoma, the plan will be to switch therapy to a regimen that is of higher intensity such as RICE and if patient response to this therapy to consolidated with autologous stem cell transplant. If no response, refer for Chimeric Antigen Receptor T cell (CAR T) therapy. If however, patient is found to have double hit or triple hit large cell lymphoma, my recommendation will be to also treat with RICE or other platinum-containing high intensity lymphoma regimen. If he achieves complete response I will consolidate with allogenic stem cell transplant. If however, the best response is stable disease of partial remission, he should be referred for CAR T-cell therapy.    My office will work with Dr. Tim Rodrigse to rapidly get the needed biopsy locally. The result will help us know which way to go.    Marquise Clark MD     Assessment/plan:    1.  Grade 3 follicular lymphoma, stage IV  · Originally seen in consultation 10/29/2020 after CT scan revealed a large abdominal mass.  Clinical exam with no adenopathy, no B symptoms  · CT-guided biopsy 11/3/2020 with pathology documenting follicular lymphoma, grade 3A, c-MYC negative, BCL2 negative, BCL6 negative. The Ki-67 tumor is 30%.   · PET scan 11/5/2020 with intensely hypermetabolic bulky left mesenteric mass.  There is also metabolic lymphadenopathy in the retroperitoneal, retrodiaphragmatic, epicardial fat pad and right supraclavicular region.  · Baseline echocardiogram 11/12/2020 with ejection fraction of 56.9%  · Mediport placed 11/16/2020  · Cycle 1 CHOP Rituxan 11/17/2020  · CT scans following 2 cycles of CHOP Rituxan  with response and interval decrease in the size of the large mesenteric mass  · PET scan 2/3/2021 following 4 cycles CHOP Rituxan with near complete resolution of all sites of disease.  Minimal persistent activity at the central aspect of the  · Proceed  with final planned cycle of chemotherapy 3/3/21  The patient returned to the office on 04/30/2021. On clinical grounds he was completely asymptomatic in regard to lymphoma. He has recovered from the toxicities including the fatigue, the dysgeusia, the alopecia and so forth and he is fully functional with an ECOG performance status of 0. His comorbidities are completely quiet. This included hypertension, previous gout and previous history of hyperlipidemia.     I reviewed with the patient and his wife the PET scan in the PAC system [facility]. Unfortunately his tumor has regrowth in the site of the abdomen where the original very huge mass was located now measuring close to 6 cm in size and with an SUV activity that is sufficient to tell me that his lymphoma is regrowing. He has no disease in the liver or spleen. He has no disease in the chest or bones or any other site as far as I can tell. In fact his clinical examination today is completely negative or normal with no palpable masses in the abdomen and no peripheral adenopathy or hepatosplenomegaly. He has no B symptoms.    The most recent echocardiogram on 03/30/2021 disclosed a normal left ventricular ejection fraction.    1. I have advised the patient the fact that his lymphoma is regrowing and this is bad news only almost 6 weeks after completion of his last cycle of CHOP/Rituxan. Therefore I think we have to go through 2nd line therapies and this will include the reassessment for further chemotherapies, salvage chemotherapy for example and strong consideration for high dose chemotherapy, bone marrow transplantation or consideration for CAR T-cell therapy. I proposed for him to be seen at a  [facility] center institution like [facility], [place], operated by [facility] and I told him to not be surprised if he has a new biopsy of this tumor in this anatomical site that should be something that is doable in an easy fashion.      The patient was reviewed at [facility] by the lymphoma team. They have suggested a new biopsy and possibility of salvage RICE chemotherapy. The biopsy is scheduled for next Wednesday. I will review the patient 5 days later after the biopsy. Once that the tissue analysis is available probably the patient will be admitted to the hospital to proceed with RICE chemotherapy salvage. Two to 3 cycles of RICE will be done, reassess radiologically and then define future therapy including bone marrow transplantation or CAR T-cell therapy.    I will be in communication with [facility] once that all of these issues are achieved and then will go from there. I pointed out to him that RICE chemotherapy is a complex regimen that requires hospitalization and administration of heavy doses of chemotherapy medicines with significant fatigue, neutropenia, anemia, thrombocytopenia as a side effect and the possible need for blood transfusions, growth factor support, antibiotic support and so forth.     The patient is in the process of moving into his new house this weekend. Hopefully that will be accomplished before he proceeds with his definitive therapy.    I discussed all of these issues with the patient and his wife in the room.     I reviewed external medical record from [facility], [place], operated by [facility] with quotation placed above.            Severiano Rodriges MD  05/21/2021

## 2021-05-26 NOTE — POST-PROCEDURE NOTE
POST PROCEDURE NOTE    Procedure: abd bx    Pre-Procedure Diagnosis: lymphoma    Post-procedure Diagnosis: same    Findings: [id number]    Complications: none    Blood loss: min    Specimen Removed: 4x18G    Disposition:   Expected discharge home/Transfer back to inpatient room

## 2021-05-26 NOTE — DISCHARGE INSTRUCTIONS
EDUCATION /DISCHARGE INSTRUCTIONS  CT/US guided biopsy:  A biopsy is a procedure done to remove tissue for further analysis.  Before images are taken to locate the target area.  Images can be obtained using ultrasound, CT or MRI.  A physician will clean your skin with antiseptic soap, place a sterile towel around the site and administer a local anesthetic to numb the area.  The physician will then insert a special needle.  Sometimes images are taken of the needle after it is inserted to ensure the needle is in the correct area to be biopsied.   A sample is obtained and sent to the laboratory for study.  Occasionally the laboratory is unable to make a diagnosis from the sample and the procedure may need to be repeated.  Within a week the radiologist will send a report to your physician.  A pathologist will also examine the tissue and send a report.    Risks of the procedure include but are not limited to:   *  Bleeding    *  Infection   *  Puncture of surrounding organs *  Death     *  Lung collapse if the biopsy is near the chest which may require insertion of a      chest tube to re-inflate the lung if severe.    Benefits of the procedure:  Using x-ray helps to locate the area that requires a biopsy. The procedure is less invasive than a surgical procedure, there are no large incisions and it does not require anesthesia.    Alternatives to the procedure:  A biopsy can be performed surgically.  Risks of a surgical biopsy include exposure to anesthesia, infection, excessive bleeding and injury to abdominal organs.  A benefit of surgical biopsy is the ability to see the area to be biopsied and remove of a larger piece of tissue.    THIS EDUCATION INFORMATION WAS REVIEWED PRIOR TO PROCEDURE AND CONSENT. Patient initials__________________Time_______1115____________    Post Procedure:    *  Expect the biopsy site may be tender up to one week.    *  Rest today (no pushing pulling or straining).   *  Slowly increase  activity tomorrow.    *  If you received sedation do not drive for 24 hours.   *  Keep dressing clean and dry.   *  Leave dressing on puncture site for 24 hours.    *  You may shower when dressing removed.  Call your doctor if experiencing:   *  Signs of infection such as redness, swelling, excessive pain and / or foul        smelling drainage from the puncture site.   *  Chills or fever over 101 degrees (by mouth).   *  Unrelieved pain.   *  Any new or severe symptoms.   *  If experiencing sudden / severe shortness of breath or chest pain go to the       nearest emergency room.   Following the procedure:     Follow-up with the ordering physician as directed.    Continue to take other medications as directed by your physician unless    otherwise instructed.   If applicable, resume taking your blood thinners or Aspirin on __n/a_________.    If you have any concerns please call the Radiology Nurses Desk at [phone].  You are the most important factor in your recovery.  Follow the above instructions carefully.    Moderate Sedation  Sedation is the use of medicines to promote relaxation and relieve discomfort and anxiety during your procedure. Moderate sedation is a type of mild sedation, it is not anesthesia. When receiving moderate sedation you are less alert than normal, but you are still able to respond to instructions, touch, or both.    What are the risks?  Generally, this is a safe procedure. However, problems may occur, including but not limited to:  · Getting too much medicine (over sedation).  · Nausea or vomiting  · Allergic reaction to medicines.  · Trouble breathing. If this happens, a breathing tube may be used to help with breathing. It will be removed when you are awake and breathing on your own.  ·   What happens prior to the procedure?  · An IV catheter will be inserted into one of your veins.  · Your nurse will do a full work up including reviewing your medications, allergies, medical history, who is  taking your home and other pertinent information.  · Medicine to help you relax will be given through the IV tubing.  · The registered nurse and physician will monitor you closely during the entire procedure.  ·   What happens after the procedure?  · Your blood pressure, heart rate, breathing rate, and blood oxygen level will be monitored often until the medicines you were given have worn off.  · Do not drive for 24 hours.  · If you are an inpatient, you will return to your room where your nurse will monitor you appropriately.        I read, or had read to me, this education sheet.        __________________________________________________________      ____5/26/21 1115__________________________________  Patient/Person authorized to sign for patient                                                    Date/Time          ___________________________________________________________     ________5/6/21 1115______________________________       Witness signature                                                                                              Date/Time    Discharge Instructions after receiving Moderate Sedation  These instructions provide you with information about caring for yourself after your procedure. Your health care provider may also give you more specific instructions. Your treatment has been planned according to current medical practices, but problems sometimes occur. Call your physican or the Radiology Nurses ([phone]) if you have any problems or questions after your procedure.    What can I expect after the procedure?  After your procedure, you may:  Feel sleepy or light headed for several hours.  Feel clumsy, dizzy or have poor balance for several hours.  Feel forgetful about what happened after the procedure.  Have poor judgment for several hours.  Feel nauseous or vomit.    For at least 24 hours after the procedure:  Have a responsible adult stay with you or frequently check on you until you are  awake and alert.   Rest as needed.    Do not:  Participate in activities in which you could fall or become injured.  Drive or operate heavy machinery  Take sleeping pills or medicines that cause drowsiness.  Make important decisions or sign legal documents.  Take care of children on your own.    Eating and drinking: Clear liquids then progress slowly to a normal diet.  If you vomit, drink water, juice, or soup when you can drink without vomiting.  Make sure you have little or no nausea before eating solid foods.    General instructions: Take over-the-counter and prescription medicines only as told by your health care provider .If you have sleep apnea, surgery and certain medicines can increase your risk for breathing problems. Follow instructions from your health care provider about wearing your sleep device: If you smoke, do not smoke without supervision.  Keep all follow-up visits as told by your health care provider. This is important.    Contact your physician if:  You continue to keep feeling nauseous or you keep vomiting. You continue to feel light-headed, develop a fever or a rash.  Get help right away if you have trouble breathing    I acknowledge the above instructions:    Patient/ Responsible person _________________________________Date _5/26/21___________Time __1115__________    Witnessed by____________________________________________ Date__5/26/21___________ Time_____1115_______

## 2021-05-26 NOTE — NURSING NOTE
Patient taken by wheelchair to patient discharge to meet his wife.      Protective goggles and mask in place with all patient interactions today

## 2021-06-01 NOTE — PROGRESS NOTES
Subjective     REASON FOR FOLLOW UP:  Large abdominal mass most likely lymphoma: biopsy shows grade 3 follicular lymphoma stage IV by PET scan    HISTORY OF PRESENT ILLNESS:   DURING THE VISIT WITH THE PATIENT TODAY , PATIENT HAD FACE MASK, I HAD PROPER PROTECTIVE EQUIPMENT, AND I DID HAND HYGIENE WITH SOAP AND WATER BEFORE AND AFTER THE VISIT.         This patient returns today to the office in company of his wife after he has had a CT-guided biopsy of his recurrent mass in the abdominal cavity related to previous history of lymphoma. The pathology report was called by me on Friday by Dr. Swan showing the resolution of the follicular grade 3 lymphoma and a diffuse large cell population of cells. 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 The patient did not have any complications from the CT-guided biopsy. He has not had any modification in his appetite. His bowel function is normal. No nausea, no vomiting, no abdominal pain, no fevers, no chills, no sweats. He has no difficulty with urination.             ONCOLOGIC HISTORY:The patient is a 60 y.o. year old male who is here for an opinion about the above issue.  I had the opportunity to see this delightful individual in consultation along with his wife today in the office a 60-year-old white male who came to Mr. James Epley, APRN, at [facility] for routine assessment every 6 month visit and upon clinical examination he was found to have a very large abdominal mass. Obviously a CT scan was performed that documents a very large mass that is close to the stomach independent of the spleen, pancreas, left kidney and bowel and probably representing a conglomerate of masses and tumors that probably represents a non-Hodgkin's lymphoma. The patient states that he has had some early satiety in the last several months and he is not eating as much as he used to.  He also has mild element of constipation, his bowel movement is harder than usual once or twice a day. He has not seen any passage of blood in the stool. He denies emphatically any abdominal pain, sensation of fullness and he denies any fever, chills. A few nights ago he had some night sweats but he assumes it is because he had too many blankets on him. He has not had any pruritus. He does not feel fatigued. He denies any cough or sputum production, no shortness of breath, no pleuritic pain. He has not had any rashes in the skin. He denies any joint pain or bone pain. He denies any neurological symptomatology. He has no urinary complaints with no frequency, urgency or hematuria. He denies any other alterations at this time.     In Denise reviewed the patient back on 2020 with his wife in order to review the PET scan and the pathology report of the abdominal mass. 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 The Ki-67 tumor is 30%.     I reviewed with the patient and his wife the PET scan. This is very dramatic in regard to the SUV activity uptake in the large abdominal mass and also documents retroperitoneal adenopathy, compromise of the peritoneum and also compromise of the internal mammary nodes. There is right supraclavicular adenopathy. The PET scan is more than striking. eparation for definitive chemotherapy with CHOP and Rituxan I proposed to the patient and his wife the followin. He will need to have an echocardiogram before initiation of chemotherapy. I explained to him the reason behind that.   2. I want for him to remain on metoprolol that will be cardiac protection for the time being.   3. I would like for the patient to modify his dose of allopurinol to 300 mg a day starting as per today to prevent tumor lysis syndrome.  4. The patient will be educated about CHOP and Rituxan in the next day or so.  I discussed with him side effects of the medicines including anemia, leukopenia, thrombocytopenia, peripheral neuropathy, allergic reactions, fever and chills related to Rituxan administration.   5. I discussed with them the length of the 1st treatment that will require probably 6-7 hours in the office.  6. The patient has high risk of tumor lysis syndrome and for that reason he will come on day 2 and day 3 to receive 1000 cc of normal saline each one of those days and he will require BMP, uric acid, phosphorus and LDH each one of those days.   7. The patient will require Vascular Surgery consultation to proceed with port placement.   8. I indicated how a port is placed and I showed him the device.   9. The patient will be supported by Neulasta On-body and I explained to him how this device works and the side effects of Neulasta including bone pain that could be bad enough to require pain medicine.   10. I indicated to the patient that he will require chemotherapy treatment every 3 weeks for a total of 6 cycles and he will repeat a PET scan after cycle 2 and after cycle 6. He will have a CT scan after cycle 4.     The patient was further reviewed on 12/29/2020. He has completed already 2 cycles of chemotherapy. We scheduled him to have a PET scan that was not approved by his insurance and instead of this they approved a CT scan. I have reviewed the CT scan with the patient today and his wife. My personal interpretation shows in my opinion some disagreement with the radiologist’s interpretation. The radiologist only measured the mass in only 1 area. I measured the mass in volume and it is dramatically different. He has had probably a 75% reduction in the total volume of tumor in the left upper quadrant of his abdomen. We compared the tumor at different levels and different locations and different views. This is more obvious to me. He has normal bowel activity otherwise. His gallbladder, liver and spleen remain  normal. Pancreas and kidneys remain normal. 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 In the chest he had resolution of both his sites of disease if we compare this with the PET scan that was done at the time of the diagnosis.     I do not believe that the patient has encountered any significant side effects of the treatment with the exception of minimal anemia documented today with a hemoglobin of 11.6. His white count and his platelet count are normal. His chemistry profile is pending. The patient remains on allopurinol and he will stay on this medicine for the time being not only because he used to have gout and high uric acid before it is also the need for him to continue prophylaxis for tumor lysis even though doubt that this will be happening with the volume of tumor that is leftover.      Pet after 4th cycle near complete response, rec to continue and complete 6 cycles and recheck pet 2 mo after completion of all chemo; plans for cardio oncology referral and survivorship referral in the future.  4/30/21: PET scan shows progressive lymphoma again at the primary site in the abdominal cavity with no palpable mass. No disease in the bones or mediastinum. ECOG performance status 0. The patient completed 6 cycles of CHOP/Rituxan with no need for dose adjustment. Given the circumstances of lymphoma regrowth he will be referred for further assessment to [facility]. Consideration will be to rebiopsy, do salvage chemotherapy and strong consideration for either bone marrow transplantation or CAR T-cell therapy.  DOUBLE HIT NHL DIFFUSE LARGE CELL WAS DIAGNOSED IN 6/1/21, FURTHER DISCUSSION WITH [facility] WILL BE ENTRETAINED.      Family History   Problem Relation Age of Onset   • Rheum arthritis Brother    • Stroke Maternal Grandmother    • Cancer Paternal Grandmother         Objective     Vitals:    06/01/21 1557   BP: 141/89   Pulse: 74   Resp: 16   Temp: 98 °F (36.7  "°C)   TempSrc: Temporal   SpO2: 96%   Weight: 122 kg (269 lb 9.6 oz)   Height: 193 cm (75.98\")   PainSc: 0-No pain     Current Status 5/21/2021   ECOG score 0       Physical Exam     I HAVE PERSONALLY REVIEWED THE HISTORY OF THE PRESENT ILLNESS, PAST MEDICAL HISTORY, FAMILY HISTORY, SOCIAL HISTORY, ALLERGIES, MEDICATIONS STATED ABOVE IN THE OFFICE NOTE FROM TODAY.        GENERAL:  Well-developed, well-nourished  Patient  in no acute distress.   SKIN:  Warm, dry ,NO rashes,NO purpura ,NO petechiae.  HEENT:  Pupils were equal and reactive to light and accomodation, conjunctivae noninjected, no pterygium, normal extraocular movements, normal visual acuity.   NECK:  Supple with good range of motion; no thyromegaly or masses, no JVD or bruits, no cervical adenopathies.No carotid artery pain, no carotid abnormal pulsation , NO arterial dance.  LYMPHATICS:  No cervical, NO supraclavicular, NO axillary,NO epitrochlear , NO inguinal adenopathy.  CARDIAC   normal rate and regular rhythm, without murmur,NO rubs NO S3 NO S4 right or left .  VASCULAR VENOUS: no cyanosis, collateral circulation, varicosities, edema, palpable cords, pain, erythema.  ABDOMEN:  Soft, nontender with no hepatomegaly, no splenomegaly,luq 5 cm  mass, no ascites, no collateral circulation,no distention,no Jackson sign, no abdominal pain, no inguinal hernias,no umbilical hernia, no abdominal bruits. Normal bowel sounds.  GENITAL: Not  Performed.  EXTREMITIES  AND SPINE:  No clubbing, cyanosis or edema, no deformities or pain .No kyphosis, scoliosis, deformities or pain in spine, ribs or pelvic bone.  NEUROLOGICAL:  Patient was awake, alert, oriented to time, person and place.  UNCHANGED FROM PREVIOUS VISIT PER ANNETTE DENNIS MD 6/1/21      RECENT LABS:  Results from last 7 days   Lab Units 06/01/21  1602   WBC 10*3/mm3 4.67   NEUTROS ABS 10*3/mm3 3.53   HEMOGLOBIN g/dL 14.0   HEMATOCRIT % 39.1   PLATELETS 10*3/mm3 201   Tissue Pathology Exam [[id number] " (Order [id number])  Order  Date: 5/26/2021 Department: [facility] CT Released By: Katya Yoon (auto-released) Authorizing: Severiano Rodriges MD   Reprint Order Requisition    Tissue Pathology Exam (Order #[id number]) on 5/26/21       Tissue Pathology Exam: [id number]  Order: [id number]  Status:  Final result   Visible to patient:  No (scheduled for 6/1/2021 10:27 AM)   Next appt:  10/15/2021 at 03:30 PM in Family Medicine (James Epley, APRN)   Dx:  Left upper quadrant abdominal mass; G...  Specimen Information: A: Lymph Node; Tissue    [id number] 2: Lymph Node; Tissue        Component    Case Report   Surgical Pathology Report                         Case: [id number]                                   Authorizing Provider:  Severiano Rodriges MD        Collected:           05/26/2021 01:28 PM           Ordering Location:     [facility]  Received:            05/26/2021 02:33 PM                                  CT                                                                            Pathologist:           Ann-Marie Mcgrath MD                                                           Specimens:   1) - Lymph Node, LUQ abdominal mass/lymph node bx                                                    2) - Lymph Node, LUQ abdominal mass/lymph node bx                                          Clinical Information    Upper left abdominal mass   Final Diagnosis   1. Soft Tissue, Left Upper Quadrant Abdomen, Core Biopsy:                 A. High grade B cell lymphoma (Double hit).  See [facility] consult report attached ([id number]).     2. Soft Tissue, Left Upper Quadrant Abdomen, Core Biopsy (gross diagnosis) only:               A. Core material submitted to [facility] for flow cytometric analysis.      [facility]   Electronically signed by Ann-Marie Mcgrath MD on 6/1/2021 at 1027   Preliminary Diagnosis   1. Soft Tissue, Left Upper Quadrant Abdomen, Core Biopsy:                 A. High grade B cell lymphoma.  "See [facility] report.                     2. Soft Tissue, Left Upper Quadrant Abdomen, Core Biopsy (gross diagnosis) only:               A. Core material submitted to [facility] for flow cytometric analysis.      [facility]   Preliminary result electronically signed by Ann-Marie Mcgrath MD on 5/28/2021 at 1421   Comment    The patient's history of follicular lymphoma is noted.  The findings continue to be consistent with that diagnosis.  Material will be sent to [facility] for further evaluation.  Results will follow.    [facility]   Gross Description    1. Received in formalin labeled with the patient's name and designated \"left upper quadrant abdominal mass/lymph node biopsy\" are 4 red to gray white core fragments of soft tissue measuring in aggregate 1.5 x 0.3 x 0.1 cm.  The tissue is filtered and submitted in cassette 1A.      2. Received fresh in RPMI labeled  with the patient's name and designated \"left upper quadrant abdominal mass/lymph node biopsy\" is a single red to gray white core fragment of soft tissue measuring 1.5 x 0.1 cm in maximal tubal diameter.  The specimen is sent for flow cytometric analysis.  Total blocks this case: 1.      mb/uso/jat/brb    Special Stains    Immunohistochemical stains AE-1/AE-3, Ki67, CD3, CD5, CD10, CD20, BCL2, BCL6, CD30 and Cyclin D-1 are performed on block 1A.  No atypical infiltrating cytokeratin positive cells are identified.  The tumor cells are positive for CD20, CD10, BCL2 and BCL6.  The tumor cells are negative for CD3, CD5, CD30 and Cyclin D-1.  Ki67 is estimated at 80%.   [facility]   Microscopic Description    Core biopsy material shows a monomorphic population of atypical lymphocytes which are hyperchromatic with brisk spotty and areas of confluent necrosis.      [facility]   Flow Cytometry Summary    Material submitted to [facility] for flow cytometric analysis which reveals a CD10 positive monoclonal B cell population which by light scattered properties falls in the large cell regions " (kappa light chain restricted).  Please see that complete flow cytometry report attached ([id number]).   Resulting Agency [facility]         Specimen Collected: 05/26/21 13:28   Last Resulted: 06/01/21 10:27       Order Details     View Encounter     Lab and Collection Details     Routing     Result History           Scans on Order [id number]    Scan on 5/27/2021 1307 by Chaparrita Mcallister R: FLOW CYTOMETRY REPORT - [facility]      Scan on 6/1/2021 0930 by Jenna Will S: CONSULTATION REPORT - [facility]      Scan on 6/1/2021 0937 by Jenna Will: MOLECULAR DIAGNOSTICS REPORT (FISH ANALYSIS BCL-2/BCL-6/MYC) - [facility]      Document on 6/1/2021 1027 by Ann-Marie Mcgrath MD                      [facility] second opinion note:  The above-named has a very unique situation in which follicular lymphoma was treated with a large cell lymphoma regimen but is primary refractory. This overall presentation is highly suspicious that patient may have either transformed follicular lymphoma (transformed from follicular to large cell lymphoma) or he may have progression of his follicular lymphoma to double hit or other aggressive type of lymphoma.  My recommendation is to obtain a biopsy of the lesion as soon as possible and I can see from patient's chart that the referring physician is already thinking along these lines. The biopsy will give specific clues with which additional lines of therapy can be recommended. If the biopsy does show follicular lymphoma or transformed follicular lymphoma, the plan will be to switch therapy to a regimen that is of higher intensity such as RICE and if patient response to this therapy to consolidated with autologous stem cell transplant. If no response, refer for Chimeric Antigen Receptor T cell (CAR T) therapy. If however, patient is found to have double hit or triple hit large cell lymphoma, my recommendation will be to also treat with RICE or other platinum-containing high intensity lymphoma  regimen. If he achieves complete response I will consolidate with allogenic stem cell transplant. If however, the best response is stable disease of partial remission, he should be referred for CAR T-cell therapy.    My office will work with Dr. Tim Rodriges to rapidly get the needed biopsy locally. The result will help us know which way to go.    Marquise Clark MD     Assessment/plan:    1.  Grade 3 follicular lymphoma, stage IV  · Originally seen in consultation 10/29/2020 after CT scan revealed a large abdominal mass.  Clinical exam with no adenopathy, no B symptoms  · CT-guided biopsy 11/3/2020 with pathology documenting follicular lymphoma, grade 3A, c-MYC negative, BCL2 negative, BCL6 negative. The Ki-67 tumor is 30%.   · PET scan 11/5/2020 with intensely hypermetabolic bulky left mesenteric mass.  There is also metabolic lymphadenopathy in the retroperitoneal, retrodiaphragmatic, epicardial fat pad and right supraclavicular region.  · Baseline echocardiogram 11/12/2020 with ejection fraction of 56.9%  · Mediport placed 11/16/2020  · Cycle 1 CHOP Rituxan 11/17/2020  · CT scans following 2 cycles of CHOP Rituxan with response and interval decrease in the size of the large mesenteric mass  · PET scan 2/3/2021 following 4 cycles CHOP Rituxan with near complete resolution of all sites of disease.  Minimal persistent activity at the central aspect of the  · Proceed  with final planned cycle of chemotherapy 3/3/21  The patient returned to the office on 04/30/2021. On clinical grounds he was completely asymptomatic in regard to lymphoma. He has recovered from the toxicities including the fatigue, the dysgeusia, the alopecia and so forth and he is fully functional with an ECOG performance status of 0. His comorbidities are completely quiet. This included hypertension, previous gout and previous history of hyperlipidemia.     I reviewed with the patient and his wife the PET scan in the PAC system [facility]  [place]. Unfortunately his tumor has regrowth in the site of the abdomen where the original very huge mass was located now measuring close to 6 cm in size and with an SUV activity that is sufficient to tell me that his lymphoma is regrowing. He has no disease in the liver or spleen. He has no disease in the chest or bones or any other site as far as I can tell. In fact his clinical examination today is completely negative or normal with no palpable masses in the abdomen and no peripheral adenopathy or hepatosplenomegaly. He has no B symptoms.    The most recent echocardiogram on 03/30/2021 disclosed a normal left ventricular ejection fraction.    1. I have advised the patient the fact that his lymphoma is regrowing and this is bad news only almost 6 weeks after completion of his last cycle of CHOP/Rituxan. Therefore I think we have to go through 2nd line therapies and this will include the reassessment for further chemotherapies, salvage chemotherapy for example and strong consideration for high dose chemotherapy, bone marrow transplantation or consideration for CAR T-cell therapy. I proposed for him to be seen at a secondary center institution like [facility] and I told him to not be surprised if he has a new biopsy of this tumor in this anatomical site that should be something that is doable in an easy fashion.      The patient was reviewed at [facility] by the lymphoma team. They have suggested a new biopsy and possibility of salvage RICE chemotherapy.    I reviewed the patient on 06/01/2021. His clinical situation has not changed. He has no B symptoms. He has no pain or fatigue. Appetite is good and he is functioning normal.  He had no difficulties whatsoever with his CT-guided biopsy.     I was called by the pathologist stating clearly that this patient molecularly is MYC positive BCL2, positive BCL6 and negative typical of a diffuse large cell non-Hodgkin lymphoma double  hit. The follicular component to the lymphoma has disappeared altogether. This was discussed with Dr. Shon Joshi last Friday and then discussed with the new pathologist on Saturday.     Under the present circumstances and through my reading of double hit lymphoma upon recurrence the approach for therapy could be strongly consideration for clinical trial because salvage therapy in these patients is not dramatically efficient no matter what the regimen will be. Given these circumstances I have placed a phone call to [facility] today to talk to Dr. Jordan and discuss the issues with him. Maybe the patient will need to go to [place] to be treated and TO BE TREATED on chemotherapy CLINICAL TRAIL regimen. Again I will discuss this back with the patient upon discussing the case with Dr. Jordan.     I provided to the patient a copy of the report of the pathology and I explained to him how these conditions transform and modify themselves genetically speaking and it was easy for him to understand.     I have not made any other appointments until we make an addendum to this dictation once that I have the discussion with Dr. Jordan at [facility].   ADDENDUM: DISCUSSED WITH DR JORDAN TODAY: PT AGGRES TO PARTICIPATE IN CLINICAL TRIAL THERE THAT WILL REQUIRE WEEKLY TRIPS, AND EVENTUALLY IF POSSIBLE AND NECCESSARY CAR T CELL THERAPY VS TRANSPLANT. [facility] WILL CONTACT PATIENT  DIRECTLY        Severiano Rodriges MD  06/01/2021

## 2021-06-03 NOTE — TELEPHONE ENCOUNTER
Caller: Jake Saunders    Relationship: Self    Best call back number: [phone]    What is the best time to reach you: ASAP    Who are you requesting to speak with (clinical staff, provider,  specific staff member): DR DENNIS    Do you know the name of the person who called: DR DENNIS    What was the call regarding: CLINICAL TRIAL    Do you require a callback: YES

## 2021-06-03 NOTE — TELEPHONE ENCOUNTER
Returning call to pt regarding last note. Reviewed with Angelina, who had not heard any specifics regarding f/u with [facility]. Told pt Dr. Powers's note states melecio is supposed to personally reach out to pt. Pt stated he has not heard anything yet. Told pt I would see if our schedulers could reach out to melecio to see If they can find out any more information. Pt v/u.

## 2021-06-03 NOTE — TELEPHONE ENCOUNTER
Returning call to pt. Pt stated he had talked to Dr. Rodriges yesterday about a clinical trial at [facility], pt stated Dr. Rodriges was going to reach out to them. Pt stated Dr. Rodriges had told him if he hadn't heard from him by the end of the day yesterday to call him today because he would be out of the office on Friday. Told pt he is actually out of the office starting today, but that I would run it by his clinic nurse to see if she has heard anything. Pt v/u.

## 2021-06-04 NOTE — TELEPHONE ENCOUNTER
----- Message from Ruby Kumar RN sent at 6/3/2021  2:19 PM EDT -----  Dr. Rodriges reached out to [place] yesterday and spoke with Dr. Clark regarding clinical trial for pt. Dr. Rodriges's note stated janna is supposed to reach out to pt personally. Pt calling because he has not heard anything yet. Is someone able to call janna to check on appt for pt since Dr. Rodriges is out of the office until Monday?    Thank you!

## 2021-06-16 NOTE — TELEPHONE ENCOUNTER
PT HAS BEEN SCHEDULED FOR Monday AND Thursday AT THIS TIME - PT WILL CALL WITH ANY UPDATES THAT ARE NEEDED PLEASE TRANSFER TO MIR [place]INO [place]

## 2021-06-16 NOTE — TELEPHONE ENCOUNTER
CALLED PT , PT HAS BEEN SCHEDULED FOR HIS APPTS BIWEEKLY - PT STATED THAT [facility] WANTED THE PT SEEN TWICE WEEKLY, HE IS GOING TO CONFIRM WITH THE DRS THERE AND CALL MIR BACK AT [facility], I HAVE SCHEDULED THE PT WITH HIS FIRST APPT BUT THIS IS SUBJECT TO CHANGE

## 2021-06-16 NOTE — TELEPHONE ENCOUNTER
Katya with [facility] calling to inform us that pt is finishing RICE therapy and will be discharged Saturday, he will require twice weekly CBC with RN review.   Informed her that we will place orders and have our scheduling team call him with his appts.   Message sent to scheduling desk and Angelina CRENSHAW

## 2021-06-16 NOTE — TELEPHONE ENCOUNTER
----- Message from Sheila Hanson RN sent at 6/16/2021  2:24 PM EDT -----  Received a call from [facility] pt is being discharged Saturday after receiving high dose chemotherapy, he will require Bi-Weekly CBC with RN review.  Please call pt with appt shani Alfaro if you would please place the orders for labs    Thanks

## 2021-06-17 NOTE — TELEPHONE ENCOUNTER
Caller: Jake Saunders    Relationship: Self    Best call back number: [phone]    What is the best time to reach you: ANYTIME    Who are you requesting to speak with (clinical staff, provider,  specific staff member): MIR RUBY AT [facility]    What was the call regarding: JAKE CALLED WITH UPDATES FOR HIS SCHEDULE FOR MIR. CHARLENE WAS UNABLE TO REACH WARM TRANSFER.    Do you require a callback: YES

## 2021-06-21 NOTE — NURSING NOTE
Pt is here for lab with RN review. Pt received Cycle 1 of RICE at [facility] last week with Ziextenzo (Neulasta biosimilar) support on Saturday. CBC reviewed with pt, counts are stable for this pt at this time. Pt has no complaints. He denies any s/s of infection and other than feeling tired reports he is doing well. Copy of labs given to pt and f/u appt reviewed. Pt is instructed to call the office with any concerns or new symptoms prior to next visit. Pt v/u.    Lab Results   Component Value Date    WBC 30.42 (H) 06/21/2021    HGB 15.2 06/21/2021    HCT 43.7 06/21/2021    MCV 89.2 06/21/2021     06/21/2021

## 2021-06-24 NOTE — NURSING NOTE
Pt here for RN review with CBC. ANC 0.54, plts 96. S/w Malorie Sanchez, APRN, will start on Levaquin 500mg PO tabs x 7 days. Pt had some c/o fatigue but nothing else. Sent him to scheduling desk to make appt for next week. Advised the pt to wear his mask, wash his hands and stay away from sick people. Pt v/u. Script routed to Malorie.

## 2021-06-25 NOTE — TELEPHONE ENCOUNTER
Attempted to return call to pt. No answer, lvm.     Called pharmacy regarding levaquin. Per pharmacy, insurance is saying the rx is being filled too soon. Pharmacy stated they are going to call pt's insurance to see about getting it filled.

## 2021-06-25 NOTE — TELEPHONE ENCOUNTER
Caller: Jake Saunders    Relationship: Self    Best call back number: [phone]    What was the call regarding: JAKE CALLED TO LET US KNOW THAT THE LEVOFLOXACIN PX THAT WAS SENT OVER YESTERDAY CANNOT BE FILLED. HE SAYS [facility] GAVE HIM THE SAME PX LAST WEEK SO THAT IS WHY THE PHARMACY WONT FILL IT. HE ALSO WANTS TO LET US KNOW THAT HE IS TAKING FLUCONAZOLE 200MG TWICE DAILY AND VALACYCLOVIR HCL 500MG TWICE DAILY.

## 2021-06-29 NOTE — NURSING NOTE
Pt is here for lab with RN review.  CBC reviewed with pt, counts are stable for this pt at this time. Plts dropped to 61, explained bleeding precautions. Pt has no complaints.  Copy of labs given to pt and f/u appt reviewed. Pt is instructed to call the office with any concerns or new symptoms prior to next visit. Pt v/u.     Lab Results   Component Value Date    WBC 4.77 06/29/2021    HGB 13.5 06/29/2021    HCT 38.2 06/29/2021    MCV 88.8 06/29/2021    PLT 61 (L) 06/29/2021

## 2021-07-02 NOTE — PROGRESS NOTES
Pt here for RN review. Counts have improved. Pt reports getting RICE tx at [facility]. Pt has no complaints today. Provided copy of labs and sent pt to scheduling.    Lab Results   Component Value Date    WBC 7.04 07/02/2021    HGB 13.3 07/02/2021    HCT 38.4 07/02/2021    MCV 87.7 07/02/2021     (L) 07/02/2021

## 2021-07-02 NOTE — TELEPHONE ENCOUNTER
----- Message from Katya Ruiz RN sent at 7/2/2021  8:34 AM EDT -----  Regarding: APPT NEEDED TODAY  PT NEEDS APPT TODAY FOR LAB AND RN REVJuliocesar KHAN AWARE. WE WERE THINKING AROUND 3ISH. PLEASE CALL PT TO CONFIRM. THANKS!

## 2021-07-02 NOTE — TELEPHONE ENCOUNTER
Caller: RASHAD    Relationship to patient: PATIENT    Best call back number: [phone]     Chief complaint: HE MISSED HIS APPT YESTERDAY    Type of visit: LAB    Requested date: TODAY AROUND NOON OR LATER IN THE AFTERNOON    If rescheduling, when is the original appointment: 07/01

## 2021-07-13 NOTE — NURSING NOTE
Lab Results   Component Value Date    WBC 24.31 (H) 07/13/2021    HGB 14.1 07/13/2021    HCT 39.7 07/13/2021    MCV 86.5 07/13/2021     07/13/2021     Pt is here for lab with RN review.  CBC reviewed with pt, counts are stable for this pt at this time. Patient completed RICE on 7/10/21 at [facility]. He reports feeling wiped out and light headed. B/P 89/66 per doppler and 100/74 manually. He states that is how he usually feels after treatment and it  takes him a few days to feel better. Denies nausea or diarrhea.Staes that he probably wasn't drinking enough. Discussed with NASREEN Lou and will give 1000 cc NS. Discussed giving him IVF after each treatment of chemo. Patient is agreeable.  Copy of labs given to pt and f/u appt reviewed. Pt is instructed to call the office with any concerns or new symptoms prior to next visit. Pt marsha

## 2021-07-15 NOTE — NURSING NOTE
Pt is here for lab with RN review.  CBC reviewed with pt, counts are stable for this pt at this time. ANC 0.93, S/w GARDENIA Long, no interventions necessary at this time. Pt states he is feeling much better since getting fluids on Tuesday. Pt will be back next week on Tuesday and Thursday to monitor counts. He will travel to [facility] on 7/27 for a PET scan. Drs will determine further treatment following his scan.  Copy of labs given to. Pt is instructed to call the office with any concerns or new symptoms prior to next visit. Pt v/u.     Lab Results   Component Value Date    WBC 1.17 (L) 07/15/2021    HGB 11.9 (L) 07/15/2021    HCT 33.1 (L) 07/15/2021    MCV 86.6 07/15/2021     07/15/2021

## 2021-07-20 NOTE — PROGRESS NOTES
Pt here for an RN review. Plts have declined since last visit. Pt had last tx for RICE on 7/9. Pt reports feeling well. Denies issues with bleeding or bruising. Pt also has appt with [facility] next Tuesday. S/w Dr. Rodriges about counts. Per Dr. Rodriges pt should notify [facility] of plt count. Pt v/u and given copy of labs.    Lab Results   Component Value Date    WBC 13.51 (H) 07/20/2021    HGB 11.3 (L) 07/20/2021    HCT 31.8 (L) 07/20/2021    MCV 86.4 07/20/2021    PLT 34 (L) 07/20/2021

## 2021-07-22 NOTE — NURSING NOTE
Pt here for CBC review. Last received treatment with [facility] on 7/9. Platelets are down to 32k today. WBC slightly elevated. Denies bleeding/bruising. Denies fever/chills. He reports that he sends a copy of these labs to his [facility] doctor and he will see that doctor on Tuesday. He has no further questions/concerns today. He will notify us when he needs to come back after Tuesday.     Lab Results   Component Value Date    WBC 17.11 (H) 07/22/2021    HGB 11.4 (L) 07/22/2021    HCT 32.8 (L) 07/22/2021    MCV 87.5 07/22/2021    PLT 32 (L) 07/22/2021

## 2021-08-30 NOTE — TELEPHONE ENCOUNTER
Rx Refill Note  Requested Prescriptions     Pending Prescriptions Disp Refills   • allopurinol (ZYLOPRIM) 100 MG tablet [Pharmacy Med Name: ALLOPURINOL 100 MG TABLET] 90 tablet 1     Sig: TAKE 1 TABLET BY MOUTH EVERY DAY      Last office visit with prescribing clinician: 4/16/2021      Next office visit with prescribing clinician: 10/15/2021            David Og Rep  08/30/21, 13:36 EDT

## 2021-09-30 NOTE — TELEPHONE ENCOUNTER
Pt progressed on CART at [facility]. They would like us to give pt AXEL here at [facility] while they wait for approval on another clinical trail

## 2021-10-04 PROBLEM — I95.2 HYPOTENSION DUE TO DRUGS: Status: ACTIVE | Noted: 2021-01-01

## 2021-10-04 PROBLEM — R79.89 PRERENAL AZOTEMIA: Status: ACTIVE | Noted: 2021-01-01

## 2021-10-04 PROBLEM — E86.0 DEHYDRATION, SEVERE: Status: ACTIVE | Noted: 2021-01-01

## 2021-10-04 NOTE — PROGRESS NOTES
Subjective     REASON FOR FOLLOW UP:  Large abdominal mass most likely lymphoma: biopsy shows grade 3 follicular lymphoma stage IV by PET scan    HISTORY OF PRESENT ILLNESS:   DURING THE VISIT WITH THE PATIENT TODAY , PATIENT HAD FACE MASK, I HAD PROPER PROTECTIVE EQUIPMENT, AND I DID HAND HYGIENE WITH SOAP AND WATER BEFORE AND AFTER THE VISIT.   This patient was brought today to the office by his wife feeling terrible. During the last 3-4 days he has not had anything to eat and not too much of anything to drink. He has been thirsty and he has been profoundly fatigued and tired to the point of being surrendered to bed on Saturday and Sunday. He just completed 8 days of radiation therapy to a recurrent progressive lymphoma in the central aspect of his abdomen and was treated at [facility]. He has not had too much of nausea or vomiting, bowel activity with no diarrhea and urination in volume that has decreased. He is very thirsty. Upon arrival to the floor the patient was almost passing out and his blood pressure systolic was 90, diastolic of 60 with a temperature of 98.6. His pulse was 120. On further questioning the patient has not had any fevers or chills. He has not had any sinus congestion, sores in his mouth. He has not had any cough, shortness of breath, pleuritic pain, hemoptysis or palpitations. He has not had any rash in the skin, no joint pain, no back pain, no neurological discomfort. The patient has had further chemotherapy with RICE, subsequently he underwent CAR T-cell therapy, he progressed after this in regard to his lymphoma and that is why he received further radiation therapy to the tumoral lesion in the abdomen. Pathological analysis was obtained from the tumor in the abdomen documented a persistent lymphoma large cell diffuse.     He is supposed to go back to [facility] in a few weeks in order to proceed with further therapy for his disease in a salvage methodology.                         ONCOLOGIC HISTORY:The patient is a 60 y.o. year old male who is here for an opinion about the above issue.  I had the opportunity to see this delightful individual in consultation along with his wife today in the office a 60-year-old white male who came to Mr. James Epley, APRN, at [facility] for routine assessment every 6 month visit and upon clinical examination he was found to have a very large abdominal mass. Obviously a CT scan was performed that documents a very large mass that is close to the stomach independent of the spleen, pancreas, left kidney and bowel and probably representing a conglomerate of masses and tumors that probably represents a non-Hodgkin's lymphoma. The patient states that he has had some early satiety in the last several months and he is not eating as much as he used to. He also has mild element of constipation, his bowel movement is harder than usual once or twice a day. He has not seen any passage of blood in the stool. He denies emphatically any abdominal pain, sensation of fullness and he denies any fever, chills. A few nights ago he had some night sweats but he assumes it is because he had too many blankets on him. He has not had any pruritus. He does not feel fatigued. He denies any cough or sputum production, no shortness of breath, no pleuritic pain. He has not had any rashes in the skin. He denies any joint pain or bone pain. He denies any neurological symptomatology. He has no urinary complaints with no frequency, urgency or hematuria. He denies any other alterations at this time.     In [place] reviewed the patient back on 11/12/2020 with his wife in order to review the PET scan and the pathology report of the abdominal mass. 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 The Ki-67 tumor is 30%.     I reviewed with the patient and his wife the PET scan.  This is very dramatic in regard to the SUV activity uptake in the large abdominal mass and also documents retroperitoneal adenopathy, compromise of the peritoneum and also compromise of the internal mammary nodes. There is right supraclavicular adenopathy. The PET scan is more than striking. eparation for definitive chemotherapy with CHOP and Rituxan I proposed to the patient and his wife the followin. He will need to have an echocardiogram before initiation of chemotherapy. I explained to him the reason behind that.   2. I want for him to remain on metoprolol that will be cardiac protection for the time being.   3. I would like for the patient to modify his dose of allopurinol to 300 mg a day starting as per today to prevent tumor lysis syndrome.  4. The patient will be educated about CHOP and Rituxan in the next day or so. I discussed with him side effects of the medicines including anemia, leukopenia, thrombocytopenia, peripheral neuropathy, allergic reactions, fever and chills related to Rituxan administration.   5. I discussed with them the length of the 1st treatment that will require probably 6-7 hours in the office.  6. The patient has high risk of tumor lysis syndrome and for that reason he will come on day 2 and day 3 to receive 1000 cc of normal saline each one of those days and he will require BMP, uric acid, phosphorus and LDH each one of those days.   7. The patient will require Vascular Surgery consultation to proceed with port placement.   8. I indicated how a port is placed and I showed him the device.   9. The patient will be supported by Neulasta On-body and I explained to him how this device works and the side effects of Neulasta including bone pain that could be bad enough to require pain medicine.   10. I indicated to the patient that he will require chemotherapy treatment every 3 weeks for a total of 6 cycles and he will repeat a PET scan after cycle 2 and after cycle 6. He will have a CT  scan after cycle 4.     The patient was further reviewed on 12/29/2020. He has completed already 2 cycles of chemotherapy. We scheduled him to have a PET scan that was not approved by his insurance and instead of this they approved a CT scan. I have reviewed the CT scan with the patient today and his wife. My personal interpretation shows in my opinion some disagreement with the radiologist’s interpretation. The radiologist only measured the mass in only 1 area. I measured the mass in volume and it is dramatically different. He has had probably a 75% reduction in the total volume of tumor in the left upper quadrant of his abdomen. We compared the tumor at different levels and different locations and different views. This is more obvious to me. He has normal bowel activity otherwise. His gallbladder, liver and spleen remain normal. Pancreas and kidneys remain normal. 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 In the chest he had resolution of both his sites of disease if we compare this with the PET scan that was done at the time of the diagnosis.     I do not believe that the patient has encountered any significant side effects of the treatment with the exception of minimal anemia documented today with a hemoglobin of 11.6. His white count and his platelet count are normal. His chemistry profile is pending. The patient remains on allopurinol and he will stay on this medicine for the time being not only because he used to have gout and high uric acid before it is also the need for him to continue prophylaxis for tumor lysis even though doubt that this will be happening with the volume of tumor that is leftover.      Pet after 4th cycle near complete response, rec to continue and complete 6 cycles and recheck pet 2 mo after completion of all chemo; plans for cardio oncology referral and survivorship referral in the future.  4/30/21: PET scan shows progressive lymphoma again at the  primary site in the abdominal cavity with no palpable mass. No disease in the bones or mediastinum. ECOG performance status 0. The patient completed 6 cycles of CHOP/Rituxan with no need for dose adjustment. Given the circumstances of lymphoma regrowth he will be referred for further assessment to [facility]. Consideration will be to rebiopsy, do salvage chemotherapy and strong consideration for either bone marrow transplantation or CAR T-cell therapy.  DOUBLE HIT NHL DIFFUSE LARGE CELL WAS DIAGNOSED IN 6/1/21, FURTHER DISCUSSION WITH [facility] WILL BE ENTRETAINED.      Family History   Problem Relation Age of Onset   • Rheum arthritis Brother    • Stroke Maternal Grandmother    • Cancer Paternal Grandmother         Objective     Vitals:    10/04/21 1012   BP: (!) 86/64   Pulse: 110   Temp: 98.2 °F (36.8 °C)   TempSrc: Oral   SpO2: 99%   PainSc: 0-No pain     Current Status 5/21/2021   ECOG score 0       Physical Exam     I HAVE PERSONALLY REVIEWED THE HISTORY OF THE PRESENT ILLNESS, PAST MEDICAL HISTORY, FAMILY HISTORY, SOCIAL HISTORY, ALLERGIES, MEDICATIONS STATED ABOVE IN THE  NOTE FROM TODAY.        GENERAL:  Well-developed, well-nourished  Patient  in  acute distress.   SKIN:  Warm, dry ,NO rashes,NO purpura ,NO petechiae. Pale  HEENT:  Pupils were equal and reactive to light and accomodation, conjunctivae noninjected, no pterygium, normal extraocular movements, normal visual acuity.   NECK:  Supple with good range of motion; no thyromegaly , no other masses, no JVD or bruits, no cervical adenopathies.No carotid artery pain, no carotid abnormal pulsation , NO arterial dance.  LYMPHATICS:  No cervical, NO supraclavicular, NO axillary,NO epitrochlear , NO inguinal adenopathy.  CARDIAC   abnormal rate and regular rhythm, without murmur,NO rubs NO S3 NO S4 right or left .  LUNGS: normal breath sounds bilateral, no wheezing, rhonchi, crackles or rubs.  VASCULAR VENOUS: no cyanosis, collateral  circulation, varicosities, edema, palpable cords, pain, erythema.  ABDOMEN:  Soft, nontender with no hepatomegaly, no splenomegaly,no masses, no ascites, no collateral circulation,no distention,no [place] sign.  EXTREMITIES  AND SPINE:  No clubbing, cyanosis or edema, no deformities , no pain .No kyphosis, scoliosis, no other deformities, no pain in spine, no pain in ribs , no pain inpelvic bone.well perfused  NEUROLOGICAL:  Patient was awake, alert, oriented to time, person and place.      RECENT LABS:  Results from last 7 days   Lab Units 10/04/21  1005   WBC 10*3/mm3 4.76   NEUTROS ABS 10*3/mm3 4.38   HEMOGLOBIN g/dL 9.0*   HEMATOCRIT % 27.2*   PLATELETS 10*3/mm3 225         Results from last 7 days   Lab Units 10/04/21  1005   SODIUM mmol/L 132*   POTASSIUM mmol/L 3.5   CHLORIDE mmol/L 91*   CO2 mmol/L 26.2   BUN mg/dL 26*   CREATININE mg/dL 1.28   CALCIUM mg/dL 9.3   ALBUMIN g/dL 3.90   BILIRUBIN mg/dL 0.4   ALK PHOS U/L 61   ALT (SGPT) U/L 25   AST (SGOT) U/L 22   GLUCOSE mg/dL 110     Request#:[id number]  Service date: 9/15/2021 14:15      FISH IMPRESSION:     Abnormal for atypical rearrangements of IGH/BCL2 with additional BCL2 (2~6) copies and one fusion in 96% of cells.  Abnormal for a rearrangement of the MYC locus at 8q24 in 95% of cells.  Negative for rearrangement of the BCL6 locus at 3q27.    FISH KARYOTYPE:      nuc kenan 3q27(BCL6x2)[200]  nuc kenan 14q32(IGHx2),18q21(BCL2x3~7)(IGH con BCL2x1)[192/200]  nuc kenan 8q24(MYCx2)(5'MYC sep 3'MYCx1)[190/200]      COMMENTS:    Fluorescence in situ hybridization (FISH) analysis was performed on a paraffin-embedded abdominal mass ([id number]) with an original collection date of 09/15/2021.    Results of the FISH studies with the 6 DNA probes described above were analyzed in at least 200 cells per probe. No abnormal signal patterns were detected for the target regions of chromosomes 3.    Normal cut off values for probes:    5.0% for BCL6  5.0% for IGH/BCL2  5.0%  for MYC    These probes were developed by [employer] and their performance characteristics determined by [facility] as required by the CLIA '88 regulations. These probes have not been cleared or approved by the U.S. Food and Drug Administration.        Claritza Gomez, PhD, [facility]   reviewed and approved    Electronically signed out by:  MILVIA MONTGOMERY, VIDA ROJAS  [facility]  - 09/14/2021 2:36 PM CDT    1.  FDG avid left abdominal mass has increased in size compared to PET/CT on 7/27/2021.  2.  Additional FDG uptake in the right internal mammary lymph node, left pleural base/diaphragm, mesenteric/peritoneal nodularities, spleen, GI tract, and pelvic ascites are concerning for metastases.       The estimated dose to any other individual from exposure to the released individual meets the requirements of [facility], [place] Regulation 1200-02-07-.35(2) and [facility] 10 CFR 35.75; the patient was released without written instructions.    Electronically signed by Nitish Macdonald on 9/14/2021 11:27 AM    AMRIK, Adi Miller MD, have reviewed the images and verify the above interpretation on 9/14/2021 2:36 PM.    Electronically signed by  Adi Miller MD on 9/14/2021 2:36 PM  Assessment/plan:    1.  Grade 3 follicular lymphoma, stage IV  · Originally seen in consultation 10/29/2020 after CT scan revealed a large abdominal mass.  Clinical exam with no adenopathy, no B symptoms  · CT-guided biopsy 11/3/2020 with pathology documenting follicular lymphoma, grade 3A, c-MYC negative, BCL2 negative, BCL6 negative. The Ki-67 tumor is 30%.   · PET scan 11/5/2020 with intensely hypermetabolic bulky left mesenteric mass.  There is also metabolic lymphadenopathy in the retroperitoneal, retrodiaphragmatic, epicardial fat pad and right supraclavicular region.  · Baseline echocardiogram 11/12/2020 with ejection fraction of 56.9%  · Mediport placed 11/16/2020  · Cycle 1 CHOP Rituxan 11/17/2020  · CT scans following 2 cycles of CHOP Rituxan with response and  interval decrease in the size of the large mesenteric mass  · PET scan 2/3/2021 following 4 cycles CHOP Rituxan with near complete resolution of all sites of disease.  Minimal persistent activity at the central aspect of the  · Proceed  with final planned cycle of chemotherapy 3/3/21  The patient returned to the office on 04/30/2021. On clinical grounds he was completely asymptomatic in regard to lymphoma. He has recovered from the toxicities including the fatigue, the dysgeusia, the alopecia and so forth and he is fully functional with an ECOG performance status of 0. His comorbidities are completely quiet. This included hypertension, previous gout and previous history of hyperlipidemia.    The last time that I saw the patient was almost 4 months ago when he had progressive lymphoma in the central aspect of his abdomen and rebiopsy documented a double hit non-Hodgkin's lymphoma. He was seen at [facility] lymphoma team, he had 2 cycles of RICE chemotherapy and subsequently he proceeded with CAR T-cell therapy. Unfortunately he has progressed through all of this. A new mass biopsy was obtained recently at [facility] that still documents a diffuse large cell lymphoma double hit. He received 8 sessions of radiation therapy directed to the tumor and the PET scan documented new sites of disease in different locations in the chest and abdomen.  For this reason the patient was advised to go back for further chemotherapy.     Today the patient has profound hypotension. He has not too much to eat or drink in the last 4-5 days, he does not look to be septic, his white count is normal, the hemoglobin is a little bit on the low side, the platelet count is normal. The chemistry profile is shows a BUN of 25, but a creatinine of 1.2 and this is typical of prerenal azotemia.    The patient's potassium and sodium are appropriate, the blood sugar is appropriate. His chemistry profile is remarkable for a  persistent elevation of his LDH in the 500 category. I have ordered a procalcitonin, TSH, cortisol level, all of them are pending.     I have reviewed in Care Everywhere the analysis of his PET scan and the pathology report recently. I have reviewed the notes by his oncologist at [facility].    My approach today is that this patient has significant hypotension with hypoperfusion of the kidneys prerenal azotemia and also he has anemia associated with neoplastic disease.     I do believe that the patient has volume depletion. He has required almost 2000 cc of IV fluids in the office today and upon completion of this his blood pressure was 100/60 and the patient was feeling dramatically better. His pulse came down to 86. He had no temperature while being in the office and he stayed with us close to 4 hours.     We will review his other laboratory testing available this afternoon. In preparation for all of this I advised my nurse to remove the needle from his port and she also proceeded with giving him a steroid IV in consideration of some myotonia crisis after so many rounds of chemotherapy and so much medicines given to him.     I reviewed his medications and I discontinued all of his blood pressure medication, I put his allopurinol on hold and I renewed his proton pump inhibitor.     The patient will require a video visit or phone visit tomorrow to see how he woke up and see how he is feeling. If his blood pressure remains low below 90/60 I advised the patient's wife that he will require to be in the hospital further.    If any other testing done today shows any other major abnormalities we will call the patient back and act upon depending on the need and circumstances.     The goal will be to raise his blood pressure, allow him to function better, improve volume status and I asked him to have some Gatorade or Powerade drinks besides soup and juice in abundance.     Hopefully this will have a positive  impact on his situation. If he feels better we will further discuss tomorrow what other plan we could have for the next week.     Under these circumstances the patient is not fit to have any other form of treatment for his persistent lymphoma double hit. We also need to let the radiation therapy to know the effect of his mass in the abdomen knowing that the PET scan shows other sites of disease as stated above, he will require chemotherapy initiation soon salvage.     ·         Severiano Rodriges MD  10/04/2021

## 2021-10-05 NOTE — PROGRESS NOTES
Subjective     REASON FOR FOLLOW UP:  Large abdominal mass most likely lymphoma: biopsy shows grade 3 follicular lymphoma stage IV by PET scan    HISTORY OF PRESENT ILLNESS:   DURING THE VISIT WITH THE PATIENT TODAY , PATIENT HAD FACE MASK, I HAD PROPER PROTECTIVE EQUIPMENT, AND I DID HAND HYGIENE WITH SOAP AND WATER BEFORE AND AFTER THE VISIT.   This patient was brought today to the office by his wife feeling terrible. During the last 3-4 days he has not had anything to eat and not too much of anything to drink. He has been thirsty and he has been profoundly fatigued and tired to the point of being surrendered to bed on Saturday and Sunday. He just completed 8 days of radiation therapy to a recurrent progressive lymphoma in the central aspect of his abdomen and was treated at [facility]. He has not had too much of nausea or vomiting, bowel activity with no diarrhea and urination in volume that has decreased. He is very thirsty. Upon arrival to the floor the patient was almost passing out and his blood pressure systolic was 90, diastolic of 60 with a temperature of 98.6. His pulse was 120. On further questioning the patient has not had any fevers or chills. He has not had any sinus congestion, sores in his mouth. He has not had any cough, shortness of breath, pleuritic pain, hemoptysis or palpitations. He has not had any rash in the skin, no joint pain, no back pain, no neurological discomfort. The patient has had further chemotherapy with RICE, subsequently he underwent CAR T-cell therapy, he progressed after this in regard to his lymphoma and that is why he received further radiation therapy to the tumoral lesion in the abdomen. Pathological analysis was obtained from the tumor in the abdomen documented a persistent lymphoma large cell diffuse.     He is supposed to go back to [facility] in a few weeks in order to proceed with further therapy for his disease in a salvage methodology.          I HAVE CALLED THIS PATIENT ON THE PHONE TODAY AND VERBALLY HAS CONSENTED ABOUT VIDEOMEDICINE VISIT     The patient was further reviewed by video medicine on 10/05/2021. Since the visit yesterday and the aggressive program of hydration the patient has had a dramatic comeback. He had a great meal last night, he had a good breakfast this morning, he had soup for lunch and his urinary output has picked up a lot, the urine is clear, he has no fever and his blood pressure has come back in the 115/70. He feels dramatically better. He is hungry, he wants to walk, he had good bowel activity, he has no pain and he has no cough or shortness of breath. He has been able to stand up and walk on his own with no limitations.                ONCOLOGIC HISTORY:The patient is a 60 y.o. year old male who is here for an opinion about the above issue.  I had the opportunity to see this delightful individual in consultation along with his wife today in the office a 60-year-old white male who came to Mr. James Epley, APRN, at [facility] for routine assessment every 6 month visit and upon clinical examination he was found to have a very large abdominal mass. Obviously a CT scan was performed that documents a very large mass that is close to the stomach independent of the spleen, pancreas, left kidney and bowel and probably representing a conglomerate of masses and tumors that probably represents a non-Hodgkin's lymphoma. The patient states that he has had some early satiety in the last several months and he is not eating as much as he used to. He also has mild element of constipation, his bowel movement is harder than usual once or twice a day. He has not seen any passage of blood in the stool. He denies emphatically any abdominal pain, sensation of fullness and he denies any fever, chills. A few nights ago he had some night sweats but he assumes it is because he had too many blankets on him. He has not had any pruritus. He  does not feel fatigued. He denies any cough or sputum production, no shortness of breath, no pleuritic pain. He has not had any rashes in the skin. He denies any joint pain or bone pain. He denies any neurological symptomatology. He has no urinary complaints with no frequency, urgency or hematuria. He denies any other alterations at this time.     In [place] reviewed the patient back on 2020 with his wife in order to review the PET scan and the pathology report of the abdominal mass. 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 The Ki-67 tumor is 30%.     I reviewed with the patient and his wife the PET scan. This is very dramatic in regard to the SUV activity uptake in the large abdominal mass and also documents retroperitoneal adenopathy, compromise of the peritoneum and also compromise of the internal mammary nodes. There is right supraclavicular adenopathy. The PET scan is more than striking. eparation for definitive chemotherapy with CHOP and Rituxan I proposed to the patient and his wife the followin. He will need to have an echocardiogram before initiation of chemotherapy. I explained to him the reason behind that.   2. I want for him to remain on metoprolol that will be cardiac protection for the time being.   3. I would like for the patient to modify his dose of allopurinol to 300 mg a day starting as per today to prevent tumor lysis syndrome.  4. The patient will be educated about CHOP and Rituxan in the next day or so. I discussed with him side effects of the medicines including anemia, leukopenia, thrombocytopenia, peripheral neuropathy, allergic reactions, fever and chills related to Rituxan administration.   5. I discussed with them the length of the 1st treatment that will require probably 6-7 hours in the office.  6. The patient has high risk of tumor lysis syndrome and for that reason he will  come on day 2 and day 3 to receive 1000 cc of normal saline each one of those days and he will require BMP, uric acid, phosphorus and LDH each one of those days.   7. The patient will require Vascular Surgery consultation to proceed with port placement.   8. I indicated how a port is placed and I showed him the device.   9. The patient will be supported by Neulasta On-body and I explained to him how this device works and the side effects of Neulasta including bone pain that could be bad enough to require pain medicine.   10. I indicated to the patient that he will require chemotherapy treatment every 3 weeks for a total of 6 cycles and he will repeat a PET scan after cycle 2 and after cycle 6. He will have a CT scan after cycle 4.     The patient was further reviewed on 12/29/2020. He has completed already 2 cycles of chemotherapy. We scheduled him to have a PET scan that was not approved by his insurance and instead of this they approved a CT scan. I have reviewed the CT scan with the patient today and his wife. My personal interpretation shows in my opinion some disagreement with the radiologist’s interpretation. The radiologist only measured the mass in only 1 area. I measured the mass in volume and it is dramatically different. He has had probably a 75% reduction in the total volume of tumor in the left upper quadrant of his abdomen. We compared the tumor at different levels and different locations and different views. This is more obvious to me. He has normal bowel activity otherwise. His gallbladder, liver and spleen remain normal. Pancreas and kidneys remain normal. 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 In the chest he had resolution of both his sites of disease if we compare this with the PET scan that was done at the time of the diagnosis.     I do not believe that the patient has encountered any significant side effects of the treatment with the exception of  minimal anemia documented today with a hemoglobin of 11.6. His white count and his platelet count are normal. His chemistry profile is pending. The patient remains on allopurinol and he will stay on this medicine for the time being not only because he used to have gout and high uric acid before it is also the need for him to continue prophylaxis for tumor lysis even though doubt that this will be happening with the volume of tumor that is leftover.      Pet after 4th cycle near complete response, rec to continue and complete 6 cycles and recheck pet 2 mo after completion of all chemo; plans for cardio oncology referral and survivorship referral in the future.  4/30/21: PET scan shows progressive lymphoma again at the primary site in the abdominal cavity with no palpable mass. No disease in the bones or mediastinum. ECOG performance status 0. The patient completed 6 cycles of CHOP/Rituxan with no need for dose adjustment. Given the circumstances of lymphoma regrowth he will be referred for further assessment to [facility]. Consideration will be to rebiopsy, do salvage chemotherapy and strong consideration for either bone marrow transplantation or CAR T-cell therapy.  DOUBLE HIT NHL DIFFUSE LARGE CELL WAS DIAGNOSED IN 6/1/21, FURTHER DISCUSSION WITH [place] WILL BE ENTRETAINED.      Family History   Problem Relation Age of Onset   • Rheum arthritis Brother    • Stroke Maternal Grandmother    • Cancer Paternal Grandmother         Objective     There were no vitals filed for this visit.  Current Status 5/21/2021   ECOG score 0       Physical Exam       NONE  RECENT LABS:  Results from last 7 days   Lab Units 10/04/21  1005   WBC 10*3/mm3 4.76   NEUTROS ABS 10*3/mm3 4.38   HEMOGLOBIN g/dL 9.0*   HEMATOCRIT % 27.2*   PLATELETS 10*3/mm3 225         Results from last 7 days   Lab Units 10/04/21  1005   SODIUM mmol/L 132*   POTASSIUM mmol/L 3.5   CHLORIDE mmol/L 91*   CO2 mmol/L 26.2   BUN mg/dL 26*    CREATININE mg/dL 1.28   CALCIUM mg/dL 9.3   ALBUMIN g/dL 3.90   BILIRUBIN mg/dL 0.4   ALK PHOS U/L 61   ALT (SGPT) U/L 25   AST (SGOT) U/L 22   GLUCOSE mg/dL 110     Request#:[id number]  Service date: 9/15/2021 14:15      FISH IMPRESSION:     Abnormal for atypical rearrangements of IGH/BCL2 with additional BCL2 (2~6) copies and one fusion in 96% of cells.  Abnormal for a rearrangement of the MYC locus at 8q24 in 95% of cells.  Negative for rearrangement of the BCL6 locus at 3q27.    FISH KARYOTYPE:      nuc kenan 3q27(BCL6x2)[200]  nuc kenan 14q32(IGHx2),18q21(BCL2x3~7)(IGH con BCL2x1)[192/200]  nuc kenan 8q24(MYCx2)(5'MYC sep 3'MYCx1)[190/200]      COMMENTS:    Fluorescence in situ hybridization (FISH) analysis was performed on a paraffin-embedded abdominal mass ([id number]) with an original collection date of 09/15/2021.    Results of the FISH studies with the 6 DNA probes described above were analyzed in at least 200 cells per probe. No abnormal signal patterns were detected for the target regions of chromosomes 3.    Normal cut off values for probes:    5.0% for BCL6  5.0% for IGH/BCL2  5.0% for MYC    These probes were developed by [employer] and their performance characteristics determined by [facility] as required by the CLIA '88 regulations. These probes have not been cleared or approved by the U.S. Food and Drug Administration.        Claritza Gomez, PhD, [facility]   reviewed and approved    Electronically signed out by:  MILVIA MONTGOMERY, VIDA ROJAS  [facility]  - 09/14/2021 2:36 PM CDT    1.  FDG avid left abdominal mass has increased in size compared to PET/CT on 7/27/2021.  2.  Additional FDG uptake in the right internal mammary lymph node, left pleural base/diaphragm, mesenteric/peritoneal nodularities, spleen, GI tract, and pelvic ascites are concerning for metastases.       The estimated dose to any other individual from exposure to the released individual meets the requirements of [facility] Regulation  1200-02-07-.35(2) and [facility] 10 CFR 35.75; the patient was released without written instructions.    Electronically signed by Nitish Macdonald on 9/14/2021 11:27 AM    I, Adi Miller MD, have reviewed the images and verify the above interpretation on 9/14/2021 2:36 PM.    Electronically signed by  Adi Miller MD on 9/14/2021 2:36 PM            Assessment/plan:    1.  Grade 3 follicular lymphoma, stage IV  · Originally seen in consultation 10/29/2020 after CT scan revealed a large abdominal mass.  Clinical exam with no adenopathy, no B symptoms  · CT-guided biopsy 11/3/2020 with pathology documenting follicular lymphoma, grade 3A, c-MYC negative, BCL2 negative, BCL6 negative. The Ki-67 tumor is 30%.   · PET scan 11/5/2020 with intensely hypermetabolic bulky left mesenteric mass.  There is also metabolic lymphadenopathy in the retroperitoneal, retrodiaphragmatic, epicardial fat pad and right supraclavicular region.  · Baseline echocardiogram 11/12/2020 with ejection fraction of 56.9%  · Mediport placed 11/16/2020  · Cycle 1 CHOP Rituxan 11/17/2020  · CT scans following 2 cycles of CHOP Rituxan with response and interval decrease in the size of the large mesenteric mass  · PET scan 2/3/2021 following 4 cycles CHOP Rituxan with near complete resolution of all sites of disease.  Minimal persistent activity at the central aspect of the  · Proceed  with final planned cycle of chemotherapy 3/3/21  The patient returned to the office on 04/30/2021. On clinical grounds he was completely asymptomatic in regard to lymphoma. He has recovered from the toxicities including the fatigue, the dysgeusia, the alopecia and so forth and he is fully functional with an ECOG performance status of 0. His comorbidities are completely quiet. This included hypertension, previous gout and previous history of hyperlipidemia.    The last time that I saw the patient was almost 4 months ago when he had progressive lymphoma in the central aspect of his  abdomen and rebiopsy documented a double hit non-Hodgkin's lymphoma. He was seen at [facility] lymphoma team, he had 2 cycles of RICE chemotherapy and subsequently he proceeded with CAR T-cell therapy. Unfortunately he has progressed through all of this. A new mass biopsy was obtained recently at [facility] that still documents a diffuse large cell lymphoma double hit. He received 8 sessions of radiation therapy directed to the tumor and the PET scan documented new sites of disease in different locations in the chest and abdomen.  For this reason the patient was advised to go back for further chemotherapy.     Today the patient has profound hypotension. He has not too much to eat or drink in the last 4-5 days, he does not look to be septic, his white count is normal, the hemoglobin is a little bit on the low side, the platelet count is normal. The chemistry profile is shows a BUN of 25, but a creatinine of 1.2 and this is typical of prerenal azotemia.    The patient's potassium and sodium are appropriate, the blood sugar is appropriate. His chemistry profile is remarkable for a persistent elevation of his LDH in the 500 category. I have ordered a procalcitonin, TSH, cortisol level, all of them are pending.     I have reviewed in Care Everywhere the analysis of his PET scan and the pathology report recently. I have reviewed the notes by his oncologist at [facility].    My approach today is that this patient has significant hypotension with hypoperfusion of the kidneys prerenal azotemia and also he has anemia associated with neoplastic disease.     I do believe that the patient has volume depletion. He has required almost 2000 cc of IV fluids in the office today and upon completion of this his blood pressure was 100/60 and the patient was feeling dramatically better. His pulse came down to 86. He had no temperature while being in the office and he stayed with us close to 4 hours.      We will review his other laboratory testing available this afternoon. In preparation for all of this I advised my nurse to remove the needle from his port and she also proceeded with giving him a steroid IV in consideration of some myotonia crisis after so many rounds of chemotherapy and so much medicines given to him.     I reviewed his medications and I discontinued all of his blood pressure medication, I put his allopurinol on hold and I renewed his proton pump inhibitor.     The patient will require a video visit or phone visit tomorrow to see how he woke up and see how he is feeling. If his blood pressure remains low below 90/60 I advised the patient's wife that he will require to be in the hospital further.    If any other testing done today shows any other major abnormalities we will call the patient back and act upon depending on the need and circumstances.     The goal will be to raise his blood pressure, allow him to function better, improve volume status and I asked him to have some Gatorade or Powerade drinks besides soup and juice in abundance.     Hopefully this will have a positive impact on his situation. If he feels better we will further discuss tomorrow what other plan we could have for the next week.     Under these circumstances the patient is not fit to have any other form of treatment for his persistent lymphoma double hit. We also need to let the radiation therapy to know the effect of his mass in the abdomen knowing that the PET scan shows other sites of disease as stated above, he will require chemotherapy initiation soon salvage.   The patient was further reviewed by video medicine on 10/05/2021. Since the visit yesterday and progressive program of hydration he had dramatic improvement. He feels like a new person this morning. He has been able to walk around the house, he has had a nice meal last night and good breakfast and lunch today and he has not had any nausea, abdominal pain,  diarrhea. Urinary output has normalized, the urine is clear. He has no fever, no cough, no shortness of breath.     His laboratory testing from yesterday documented that the patient had a normal TSH, normal cortisol level, negative blood culture, and minimal elevation of procalcitonin. His troponin level was negative normal.     I think what I see today by video medicine is a patient that has come back. He feels substantially better, he has a smile on his face, he wants to walk, he is eating, his urinary output has normalized, his blood pressure has improved and feels better.    I asked him to check with [facility] [place] if they need for us to do any other issues at some point in the next few days. We will bring him back to the office next week to do a CBC and a stat CMP and be reviewed by nurse. Depending on the circumstances and issues maybe we need to pursue some other program of intervention until he gets approved to the next step of therapy according to recommendation at [facility] [place]. I pointed out to him that he just completed his radiation therapy to his abdomen. I did not palpate yesterday any abdominal masses that has been the normal thing on his clinical examination, large abdominal mass palpable and that was encouraging in regard to the possible benefit of radiation therapy.     He will be back in touch with us tomorrow and let us know where we go. We will make the appointment for laboratory testing and nurse next week. I asked him to call if any other issues or needs.     Duration of this video visit 15 minutes.       ·         Severiano Rodriges MD  10/05/2021

## 2021-10-08 NOTE — TELEPHONE ENCOUNTER
Discussed with Dr Rodriges. He would like someone from [facility] to get in touch with him. Mr Saunders is going to call research nurse at [facility]. He stated he is to receive Oleksandr-BR.  Per pharmacy we do not have that medication but can get it with notice.

## 2021-10-08 NOTE — TELEPHONE ENCOUNTER
Caller: ADE    Relationship: Self    Best call back number: [phone]    What is the best time to reach you: ASAP    Who are you requesting to speak with (clinical staff, provider,  specific staff member): JAISON    Do you know the name of the person who called: ADE    What was the call regarding: ADE WAS AT [place] & THEY FEEL THAT HE IS READY FOR CHEMO. NOW THAT HIS NUMBERS ARE BETTER & HE IS FEELING BETTER BUT THEY WANTED DR. DENNIS TO MAKE THE CALL.  ADE COMES IN ON TUES. & HE IS INQUIRING IF DR. DENNIS WOULD PEEK INTO HIS NURSE REVIEW FOR A MOMENT, TAKE A LOOK AT HIM & OKAY HIM FOR CHEMO.  IF GIVEN THE OKAY FOR CHEMO., IT WILL THEN NEED CAM. FOR HERE.    Do you require a callback:  YES, PLEASE

## 2021-10-11 NOTE — TELEPHONE ENCOUNTER
Caller: ADE    Relationship: Self    Best call back number: [phone]    What is the best time to reach you: BEFORE TOMORROWS APPT.    Who are you requesting to speak with (clinical staff, provider,  specific staff member): REINA    Do you know the name of the person who called: ADE     What was the call regarding: ADE DID REACH OUT TO ERIN AT [place] WHO REACHED OUT TO DR. LIN TO HAVE HER REACH OUT TO DR. DENNIS.  ADE IS INQUIRING IF DR. LIN & DR. DENNIS HAVE COMMUNICATED.  HE WOULD ALSO LIKE TO KNOW IF DR. DENNIS WILL HAVE A MOMENT TO PICK INTO SEE HIM FOR A MOMENT TOMORROW.      Do you require a callback:  YES, PLEASE

## 2021-10-12 PROBLEM — I95.2 HYPOTENSION DUE TO DRUGS: Status: RESOLVED | Noted: 2021-01-01 | Resolved: 2021-01-01

## 2021-10-12 PROBLEM — R00.0 SINUS TACHYCARDIA: Status: ACTIVE | Noted: 2021-01-01

## 2021-10-12 NOTE — PROGRESS NOTES
Subjective     REASON FOR FOLLOW UP:  Large abdominal mass most likely lymphoma: biopsy shows grade 3 follicular lymphoma stage IV by PET scan    HISTORY OF PRESENT ILLNESS:   DURING THE VISIT WITH THE PATIENT TODAY , PATIENT HAD FACE MASK, I HAD PROPER PROTECTIVE EQUIPMENT, AND I DID HAND HYGIENE WITH SOAP AND WATER BEFORE AND AFTER THE VISIT.     This patient was brought today to the office in order to be reviewed by nurse in regard to his laboratory assessment. When he walked into the office he complained of sensation of palpitations and shortness of breath and the nurse advised me to review the patient. The patient states that since last Thursday he has had persistent palpitations after he was able to gain back the control of his blood pressure was down on the floor and finally improved. Now he has palpitations when he is calmed down and sitting in the chair the heart rate average is 105, when he is walking it is around 140. He has no chest pain, no cough, no sputum production. He has had some dyspnea on exertion when the heart is running so fast. He has not had any pleurisy or hemoptysis. He denies any fever or chills. He has been touching his abdomen and he has noticed that the mass in the abdomen is now growing back in the last 3-4 days. He has not had any difficulty with urination, he is drinking liquids okay, he is eating food relatively well. He has not had so much a degree of weakness as he was before but he is experiencing some nocturnal sweats and some low grade temperature 99.5 at least on 2 different occasions.     He has very significant degree of fatigue and he has not too much energy for anything.                           ONCOLOGIC HISTORY:The patient is a 60 y.o. year old male who is here for an opinion about the above issue.  I had the opportunity to see this delightful individual in consultation along with his wife today in the office a 60-year-old white male who came to Mr. James Epley,  NASREEN at [facility] for routine assessment every 6 month visit and upon clinical examination he was found to have a very large abdominal mass. Obviously a CT scan was performed that documents a very large mass that is close to the stomach independent of the spleen, pancreas, left kidney and bowel and probably representing a conglomerate of masses and tumors that probably represents a non-Hodgkin's lymphoma. The patient states that he has had some early satiety in the last several months and he is not eating as much as he used to. He also has mild element of constipation, his bowel movement is harder than usual once or twice a day. He has not seen any passage of blood in the stool. He denies emphatically any abdominal pain, sensation of fullness and he denies any fever, chills. A few nights ago he had some night sweats but he assumes it is because he had too many blankets on him. He has not had any pruritus. He does not feel fatigued. He denies any cough or sputum production, no shortness of breath, no pleuritic pain. He has not had any rashes in the skin. He denies any joint pain or bone pain. He denies any neurological symptomatology. He has no urinary complaints with no frequency, urgency or hematuria. He denies any other alterations at this time.     In Denise reviewed the patient back on 11/12/2020 with his wife in order to review the PET scan and the pathology report of the abdominal mass. 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 The Ki-67 tumor is 30%.     I reviewed with the patient and his wife the PET scan. This is very dramatic in regard to the SUV activity uptake in the large abdominal mass and also documents retroperitoneal adenopathy, compromise of the peritoneum and also compromise of the internal mammary nodes. There is right supraclavicular adenopathy. The PET scan is more than striking.  eparation for definitive chemotherapy with CHOP and Rituxan I proposed to the patient and his wife the followin. He will need to have an echocardiogram before initiation of chemotherapy. I explained to him the reason behind that.   2. I want for him to remain on metoprolol that will be cardiac protection for the time being.   3. I would like for the patient to modify his dose of allopurinol to 300 mg a day starting as per today to prevent tumor lysis syndrome.  4. The patient will be educated about CHOP and Rituxan in the next day or so. I discussed with him side effects of the medicines including anemia, leukopenia, thrombocytopenia, peripheral neuropathy, allergic reactions, fever and chills related to Rituxan administration.   5. I discussed with them the length of the 1st treatment that will require probably 6-7 hours in the office.  6. The patient has high risk of tumor lysis syndrome and for that reason he will come on day 2 and day 3 to receive 1000 cc of normal saline each one of those days and he will require BMP, uric acid, phosphorus and LDH each one of those days.   7. The patient will require Vascular Surgery consultation to proceed with port placement.   8. I indicated how a port is placed and I showed him the device.   9. The patient will be supported by Neulasta On-body and I explained to him how this device works and the side effects of Neulasta including bone pain that could be bad enough to require pain medicine.   10. I indicated to the patient that he will require chemotherapy treatment every 3 weeks for a total of 6 cycles and he will repeat a PET scan after cycle 2 and after cycle 6. He will have a CT scan after cycle 4.     The patient was further reviewed on 2020. He has completed already 2 cycles of chemotherapy. We scheduled him to have a PET scan that was not approved by his insurance and instead of this they approved a CT scan. I have reviewed the CT scan with the patient  today and his wife. My personal interpretation shows in my opinion some disagreement with the radiologist’s interpretation. The radiologist only measured the mass in only 1 area. I measured the mass in volume and it is dramatically different. He has had probably a 75% reduction in the total volume of tumor in the left upper quadrant of his abdomen. We compared the tumor at different levels and different locations and different views. This is more obvious to me. He has normal bowel activity otherwise. His gallbladder, liver and spleen remain normal. Pancreas and kidneys remain normal. 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 In the chest he had resolution of both his sites of disease if we compare this with the PET scan that was done at the time of the diagnosis.     I do not believe that the patient has encountered any significant side effects of the treatment with the exception of minimal anemia documented today with a hemoglobin of 11.6. His white count and his platelet count are normal. His chemistry profile is pending. The patient remains on allopurinol and he will stay on this medicine for the time being not only because he used to have gout and high uric acid before it is also the need for him to continue prophylaxis for tumor lysis even though doubt that this will be happening with the volume of tumor that is leftover.      Pet after 4th cycle near complete response, rec to continue and complete 6 cycles and recheck pet 2 mo after completion of all chemo; plans for cardio oncology referral and survivorship referral in the future.  4/30/21: PET scan shows progressive lymphoma again at the primary site in the abdominal cavity with no palpable mass. No disease in the bones or mediastinum. ECOG performance status 0. The patient completed 6 cycles of CHOP/Rituxan with no need for dose adjustment. Given the circumstances of lymphoma regrowth he will be referred for further  assessment to [facility]. Consideration will be to rebiopsy, do salvage chemotherapy and strong consideration for either bone marrow transplantation or CAR T-cell therapy.  DOUBLE HIT NHL DIFFUSE LARGE CELL WAS DIAGNOSED IN 6/1/21, FURTHER DISCUSSION WITH [place] WILL BE ENTRETAINED.      Family History   Problem Relation Age of Onset   • Rheum arthritis Brother    • Stroke Maternal Grandmother    • Cancer Paternal Grandmother         Objective     There were no vitals filed for this visit.  Current Status 5/21/2021   ECOG score 0     BP: 140/72 PULSE 130 REGULAR RESP 12 O2 SAT 95% RA.      Physical Exam     I HAVE PERSONALLY REVIEWED THE HISTORY OF THE PRESENT ILLNESS, PAST MEDICAL HISTORY, FAMILY HISTORY, SOCIAL HISTORY, ALLERGIES, MEDICATIONS STATED ABOVE IN THE  NOTE FROM TODAY.        GENERAL:  Well-developed, well-nourished  Patient  in no acute distress.   SKIN:  Warm, dry ,NO rashes,NO purpura ,NO petechiae.  HEENT:  Pupils were equal and reactive to light and accomodation, conjunctivae noninjected, no pterygium, normal extraocular movements, normal visual acuity.   NECK:  Supple with good range of motion; no thyromegaly , no other masses, no JVD or bruits, no cervical adenopathies.No carotid artery pain, no carotid abnormal pulsation , NO arterial dance.  LYMPHATICS:  No cervical, NO supraclavicular, NO axillary,NO epitrochlear , NO inguinal adenopathy.  CARDIAC   ABnormal rate and regular rhythm, without murmur,NO rubs NO S3 NO S4 right or left .  LUNGS: normal breath sounds bilateral, no wheezing, rhonchi, crackles or rubs.  VASCULAR VENOUS: no cyanosis, collateral circulation, varicosities, edema, palpable cords, pain, erythema.  ABDOMEN:  Soft, nontender with no hepatomegaly, no splenomegaly,LARGE HARD NONE TENDER PALPABLE MASS IN LUQ MEASURING 15 CM IN SIZE, no ascites, no collateral circulation, distention,no Stephens sign.  EXTREMITIES  AND SPINE:  No clubbing, cyanosis or edema, no  deformities , no pain .No kyphosis, scoliosis, no other deformities, no pain in spine, no pain in ribs , no pain inpelvic bone.  NEUROLOGICAL:  Patient was awake, alert, oriented to time, person and place.        RECENT LABS:  Results from last 7 days   Lab Units 10/12/21  1042   WBC 10*3/mm3 8.00   NEUTROS ABS 10*3/mm3 7.38*   HEMOGLOBIN g/dL 9.0*   HEMATOCRIT % 28.8*   PLATELETS 10*3/mm3 190         Results from last 7 days   Lab Units 10/12/21  1042   SODIUM mmol/L 132*   POTASSIUM mmol/L 3.6   CHLORIDE mmol/L 89*   CO2 mmol/L 26.2   BUN mg/dL 16   CREATININE mg/dL 0.91   CALCIUM mg/dL 10.2   ALBUMIN g/dL 3.60   BILIRUBIN mg/dL 0.3   ALK PHOS U/L 76   ALT (SGPT) U/L 20   AST (SGOT) U/L 30   GLUCOSE mg/dL 123       Assessment/plan:    1.  Grade 3 follicular lymphoma, stage IV  · Originally seen in consultation 10/29/2020 after CT scan revealed a large abdominal mass.  Clinical exam with no adenopathy, no B symptoms  · CT-guided biopsy 11/3/2020 with pathology documenting follicular lymphoma, grade 3A, c-MYC negative, BCL2 negative, BCL6 negative. The Ki-67 tumor is 30%.   · PET scan 11/5/2020 with intensely hypermetabolic bulky left mesenteric mass.  There is also metabolic lymphadenopathy in the retroperitoneal, retrodiaphragmatic, epicardial fat pad and right supraclavicular region.  · Baseline echocardiogram 11/12/2020 with ejection fraction of 56.9%  · Mediport placed 11/16/2020  · Cycle 1 CHOP Rituxan 11/17/2020  · CT scans following 2 cycles of CHOP Rituxan with response and interval decrease in the size of the large mesenteric mass  · PET scan 2/3/2021 following 4 cycles CHOP Rituxan with near complete resolution of all sites of disease.  Minimal persistent activity at the central aspect of the  · Proceed  with final planned cycle of chemotherapy 3/3/21  The patient returned to the office on 04/30/2021. On clinical grounds he was completely asymptomatic in regard to lymphoma. He has recovered from the  toxicities including the fatigue, the dysgeusia, the alopecia and so forth and he is fully functional with an ECOG performance status of 0. His comorbidities are completely quiet. This included hypertension, previous gout and previous history of hyperlipidemia.    The last time that I saw the patient was almost 4 months ago when he had progressive lymphoma in the central aspect of his abdomen and rebiopsy documented a double hit non-Hodgkin's lymphoma. He was seen at [facility] lymphoma team, he had 2 cycles of RICE chemotherapy and subsequently he proceeded with CAR T-cell therapy. Unfortunately he has progressed through all of this. A new mass biopsy was obtained recently at [facility] that still documents a diffuse large cell lymphoma double hit. He received 8 sessions of radiation therapy directed to the tumor and the PET scan documented new sites of disease in different locations in the chest and abdomen.  For this reason the patient was advised to go back for further chemotherapy.     Today the patient has profound hypotension. He has not too much to eat or drink in the last 4-5 days, he does not look to be septic, his white count is normal, the hemoglobin is a little bit on the low side, the platelet count is normal. The chemistry profile is shows a BUN of 25, but a creatinine of 1.2 and this is typical of prerenal azotemia.    The patient's potassium and sodium are appropriate, the blood sugar is appropriate. His chemistry profile is remarkable for a persistent elevation of his LDH in the 500 category. I have ordered a procalcitonin, TSH, cortisol level, all of them are pending.     I have reviewed in Care Everywhere the analysis of his PET scan and the pathology report recently. I have reviewed the notes by his oncologist at [facility].    My approach today is that this patient has significant hypotension with hypoperfusion of the kidneys prerenal azotemia and also he has  anemia associated with neoplastic disease.     I do believe that the patient has volume depletion. He has required almost 2000 cc of IV fluids in the office today and upon completion of this his blood pressure was 100/60 and the patient was feeling dramatically better. His pulse came down to 86. He had no temperature while being in the office and he stayed with us close to 4 hours.     We will review his other laboratory testing available this afternoon. In preparation for all of this I advised my nurse to remove the needle from his port and she also proceeded with giving him a steroid IV in consideration of some myotonia crisis after so many rounds of chemotherapy and so much medicines given to him.     I reviewed his medications and I discontinued all of his blood pressure medication, I put his allopurinol on hold and I renewed his proton pump inhibitor.     The patient will require a video visit or phone visit tomorrow to see how he woke up and see how he is feeling. If his blood pressure remains low below 90/60 I advised the patient's wife that he will require to be in the hospital further.    If any other testing done today shows any other major abnormalities we will call the patient back and act upon depending on the need and circumstances.     The goal will be to raise his blood pressure, allow him to function better, improve volume status and I asked him to have some Gatorade or Powerade drinks besides soup and juice in abundance.     Hopefully this will have a positive impact on his situation. If he feels better we will further discuss tomorrow what other plan we could have for the next week.     Under these circumstances the patient is not fit to have any other form of treatment for his persistent lymphoma double hit. We also need to let the radiation therapy to know the effect of his mass in the abdomen knowing that the PET scan shows other sites of disease as stated above, he will require chemotherapy  initiation soon salvage.   The patient was further reviewed on 10/12/2021. His symptomatology today is very concerning. His blood pressure is appropriate but his pulse is 130 bpm. I went ahead and did an EKG. He has normal sinus rhythm with normal DC interval, normal QRS interval, no morphological alteration of ischemia or pericarditis but there is diffuse flattening of the T wave in all of the leads. There was no abnormal inverted T waves. The ST segment was unremarkable throughout. I do believe that the tachycardia in this patient is reflection of active lymphoma. This is very concerning about the rapid regrowth of the tumor in a question of a week. When I saw him his abdominal exam was completely flat and today he has a palpable mass in the left upper quadrant that is at least 15 cm in size telling us that radiation therapy was not able to control his disease process. I talked with his attending physician at [facility]. He does want us to go ahead and give him POLYV along with bendamustine and Rituxan. The patient was scheduled to initiate this therapy tomorrow. In preparation for that the patient will receive 500 cc of saline IV before the initiation of this and he will receive another 500 cc of saline after completion. He will require bendamustine for 2 days and he will require Rituxan on day 1. Heavy premedication will be done tomorrow. This includes hydrocortisone 200 mg IV  along with Pepcid 40 mg IV along with Benadryl and Tylenol.     The patient is aware that he probably will spend most of the day here with us tomorrow.    On day 2 of bendamustine it will be a relatively short infusion.    The patient has high risk for tumor lysis syndrome. I asked him to go ahead and take his allopurinol 300 mg oral daily and he will have further hydration tomorrow. We will ask him to come back on Thursday to recheck chemistry profile as well on Friday including STAT CMP, LDH, uric acid and phosphorus.      Very likely on those days he will require as well IV fluids.     In regard to his tachycardia, I think it is reaction to his tumoral lesion growing disease process. His LDH is now in the 700 category and last week was in the 400 category. This talks about tumor bulk. I went ahead and advised him to take metoprolol 25 mg oral twice a day and I sent a prescription to the pharmacy. Hopefully this will be able to control his heart rate and bring his heart down to a better situation in the best standing.     Hopefully this salvage treatment will have a positive impact on the patient until his attending physician at [facility] will be able to find another modality of CAR T-cell therapy for his diffuse large cell non-Hodgkin's lymphoma double hit that progressed after a follicular lymphoma grade 3 was treated successfully with CHOP and Rituxan.    I discussed all of these facts with the patient and his wife in the room.    I personally reviewed the EKG, I personally read the EKG and I personally took a rhythm strip. This was sent to Cardiology for interpretation.     In preparation for chemotherapy administration tomorrow the patient will have hepatitis B serologies done today.       ·   LENGTHY CONVERSATION WITH HIS INSURANCE COMPANY TO GET MEDICATIONS APPROVED FOR TREATMENT TOMORROW, FINALLY AUTHORIZATION GIVEN.      Severiano Rodriges MD  10/12/2021

## 2021-10-12 NOTE — NURSING NOTE
Pt here for labs and RN review. Labs reviewed with pt CBC and CMP stable for this pt at this time. . Pt feeling okay, c/o increased fatigue and SOB on exertion. Pt stated his appetite is decent and he is drinking okay, but could drink more. /86, , and o2 96. Pt wanting to know if Dr. Rodriges has spoken with Dr. LIN at [facility] and if he would have time to discuss plan of care with him.     Reviewed pt complaints, labs, and vitals with Dr. Rodriges. Per Dr. Rodriges, he spoke with Dr. LIN at [facility] and we are awaiting approval of new chemo, but as soon as it is approved pt can start on it. Dr. Rodriges concerned for pt's elevated HR and would like pt to have an EKG done in the office today as well as walk around with pulse ox on to make sure pt does not have PE. Dr. Rodriges plans to speak to pt and wife while they are in office today.     Reviewed the above with pt and pt's wife. Pt placed on EKG schedule and orders placed, will have EKG done in infusion area. Pt and pt's wife v/u, walked to waiting room to await EKG.       Lab Results   Component Value Date    WBC 8.00 10/12/2021    HGB 9.0 (L) 10/12/2021    HCT 28.8 (L) 10/12/2021    MCV 90.6 10/12/2021     10/12/2021     Lab Results   Component Value Date    GLUCOSE 123 10/12/2021    BUN 16 10/12/2021    CREATININE 0.91 10/12/2021    EGFRIFNONA 85 10/12/2021    EGFRIFAFRI 75 02/24/2020    BCR 17.6 10/12/2021    K 3.6 10/12/2021    CO2 26.2 10/12/2021    CALCIUM 10.2 10/12/2021    PROTENTOTREF 7.8 10/29/2020    ALBUMIN 3.60 10/12/2021    LABIL2 1.3 10/29/2020    AST 30 10/12/2021    ALT 20 10/12/2021

## 2021-10-14 PROBLEM — E83.52 HYPERCALCEMIA: Status: ACTIVE | Noted: 2021-01-01

## 2021-10-14 NOTE — NURSING NOTE
Per discussion with Dr. Rodriges, the Polatuzumab (Polivy) is to be held until tomorrow due to time restraint. This was discussed with Shari Ashford, nurse for Dr. Rodriges. She states that she will reenter the drug and appropriate premeds.

## 2021-10-15 NOTE — NURSING NOTE
Again reviewed with patient the possible side effects of POLIVY. He will have labs for TLS drawn at the hospital in the 3P OP treatment room. He is aware of the lab values to be assessed. Today the hgb is 7.7 but per Shari Ashford,Dr. Rodriges is aware and we are to proceed with treatment today.   
(3) no apparent problem

## 2021-10-16 NOTE — CODE DOCUMENTATION
Pt received 2 units of blood, no reactions noted. De-accessed port with heparin flush. Discharged home with planned follow up.

## 2021-10-18 NOTE — TELEPHONE ENCOUNTER
Provider: SONNY  Caller: RASHAD ENGLISH  Relationship to Patient: SELF    Phone Number: [phone]  Reason for Call: PT HAD TRANSFUSIONS OVER THE WEEKEND, AND STATES THAT HE IS WHEEZING MORE AND IF HE LAYS DOWN, AND STATES HE IS COUGHING AND HAS MUCUS, PT ALSO STATES THAT HE MAY NEED TO HAVE LABS DRAWN AND DOES NOT HAVE THIS SCHEDULED.     HE STATES THAT THE WHEEZING IS A SIDE AFFECT FROM ONE OF THE CHEMO DRUGS

## 2021-10-18 NOTE — TELEPHONE ENCOUNTER
Pt called this morning after receiving treatment on Thur/Fri and blood transfusion on Sat. He has been wheezing/coughing more since Sat. Unable to lay down flat. Reviewed with Mercedes VARNER, he needs to come in today to see Np at [facility] office. Message sent to Digiting desk.           Provider: SONNY  Caller: RASHAD ENGLISH  Relationship to Patient: SELF    Phone Number: [phone]  Reason for Call: PT HAD TRANSFUSIONS OVER THE WEEKEND, AND STATES THAT HE IS WHEEZING MORE AND IF HE LAYS DOWN, AND STATES HE IS COUGHING AND HAS MUCUS, PT ALSO STATES THAT HE MAY NEED TO HAVE LABS DRAWN AND DOES NOT HAVE THIS SCHEDULED.     HE STATES THAT THE WHEEZING IS A SIDE AFFECT FROM ONE OF THE CHEMO DRUGS

## 2021-10-18 NOTE — NURSING NOTE
Per GARDENIA Landrum, pt to receive 20mg IV Lasix.  Pt reports previously tolerating during hospital admit.  Pt's port accessed for IV lasix.  Instructions given and urinal also sent with pt and spouse.  Pt asked to report back if ineffective or fluid overload worsens or returns.  Pt v/u.  
COUGH

## 2021-10-18 NOTE — TELEPHONE ENCOUNTER
----- Message from Luli Escobar RN sent at 10/18/2021  9:08 AM EDT -----  Needs to come in today for labs and Veronica VARNER.

## 2021-10-18 NOTE — PROGRESS NOTES
Subjective     REASON FOR FOLLOW UP:  Large abdominal mass most likely lymphoma: biopsy shows grade 3 follicular lymphoma stage IV by PET scan    HISTORY OF PRESENT ILLNESS:    The patient is a 61 y.o. male with the above-mentioned history, who was brought into the office today for triage visit.  He recently initiated chemotherapy with bendamustine, Rituxan, Polivy last week.  He also received additional fluid support due to increased risk of tumor lysis syndrome.  He received 2 units packed red blood cells over the weekend for symptomatic anemia.  He notes some improvement in his energy since receiving blood.  Unfortunately, he developed shortness of breath particularly when lying flat last night.  He also reports a cough when lying flat.  He denies fevers or chills.  His cough is not purulent, only occasionally productive.  He denies feeling as though his heart is racing.  He denies any new pain.  He reports he overall tolerated chemotherapy fairly well.        ONCOLOGIC HISTORY:The patient is a 60 y.o. year old male who is here for an opinion about the above issue.  I had the opportunity to see this delightful individual in consultation along with his wife today in the office a 60-year-old white male who came to Mr. James Epley, APRN, at [facility] for routine assessment every 6 month visit and upon clinical examination he was found to have a very large abdominal mass. Obviously a CT scan was performed that documents a very large mass that is close to the stomach independent of the spleen, pancreas, left kidney and bowel and probably representing a conglomerate of masses and tumors that probably represents a non-Hodgkin's lymphoma. The patient states that he has had some early satiety in the last several months and he is not eating as much as he used to. He also has mild element of constipation, his bowel movement is harder than usual once or twice a day. He has not seen any passage of blood in the stool.  He denies emphatically any abdominal pain, sensation of fullness and he denies any fever, chills. A few nights ago he had some night sweats but he assumes it is because he had too many blankets on him. He has not had any pruritus. He does not feel fatigued. He denies any cough or sputum production, no shortness of breath, no pleuritic pain. He has not had any rashes in the skin. He denies any joint pain or bone pain. He denies any neurological symptomatology. He has no urinary complaints with no frequency, urgency or hematuria. He denies any other alterations at this time.     In [place] reviewed the patient back on 2020 with his wife in order to review the PET scan and the pathology report of the abdominal mass. 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 The Ki-67 tumor is 30%.     I reviewed with the patient and his wife the PET scan. This is very dramatic in regard to the SUV activity uptake in the large abdominal mass and also documents retroperitoneal adenopathy, compromise of the peritoneum and also compromise of the internal mammary nodes. There is right supraclavicular adenopathy. The PET scan is more than striking. eparation for definitive chemotherapy with CHOP and Rituxan I proposed to the patient and his wife the followin. He will need to have an echocardiogram before initiation of chemotherapy. I explained to him the reason behind that.   2. I want for him to remain on metoprolol that will be cardiac protection for the time being.   3. I would like for the patient to modify his dose of allopurinol to 300 mg a day starting as per today to prevent tumor lysis syndrome.  4. The patient will be educated about CHOP and Rituxan in the next day or so. I discussed with him side effects of the medicines including anemia, leukopenia, thrombocytopenia, peripheral neuropathy, allergic reactions, fever and  chills related to Rituxan administration.   5. I discussed with them the length of the 1st treatment that will require probably 6-7 hours in the office.  6. The patient has high risk of tumor lysis syndrome and for that reason he will come on day 2 and day 3 to receive 1000 cc of normal saline each one of those days and he will require BMP, uric acid, phosphorus and LDH each one of those days.   7. The patient will require Vascular Surgery consultation to proceed with port placement.   8. I indicated how a port is placed and I showed him the device.   9. The patient will be supported by Neulasta On-body and I explained to him how this device works and the side effects of Neulasta including bone pain that could be bad enough to require pain medicine.   10. I indicated to the patient that he will require chemotherapy treatment every 3 weeks for a total of 6 cycles and he will repeat a PET scan after cycle 2 and after cycle 6. He will have a CT scan after cycle 4.     The patient was further reviewed on 12/29/2020. He has completed already 2 cycles of chemotherapy. We scheduled him to have a PET scan that was not approved by his insurance and instead of this they approved a CT scan. I have reviewed the CT scan with the patient today and his wife. My personal interpretation shows in my opinion some disagreement with the radiologist’s interpretation. The radiologist only measured the mass in only 1 area. I measured the mass in volume and it is dramatically different. He has had probably a 75% reduction in the total volume of tumor in the left upper quadrant of his abdomen. We compared the tumor at different levels and different locations and different views. This is more obvious to me. He has normal bowel activity otherwise. His gallbladder, liver and spleen remain normal. Pancreas and kidneys remain normal. 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 In the chest he had resolution of  "both his sites of disease if we compare this with the PET scan that was done at the time of the diagnosis.     I do not believe that the patient has encountered any significant side effects of the treatment with the exception of minimal anemia documented today with a hemoglobin of 11.6. His white count and his platelet count are normal. His chemistry profile is pending. The patient remains on allopurinol and he will stay on this medicine for the time being not only because he used to have gout and high uric acid before it is also the need for him to continue prophylaxis for tumor lysis even though doubt that this will be happening with the volume of tumor that is leftover.      Pet after 4th cycle near complete response, rec to continue and complete 6 cycles and recheck pet 2 mo after completion of all chemo; plans for cardio oncology referral and survivorship referral in the future    Family History   Problem Relation Age of Onset   • Rheum arthritis Brother    • Stroke Maternal Grandmother    • Cancer Paternal Grandmother         Objective     Vitals:    10/18/21 1457   BP: 134/95   Pulse: 119   Resp: 16   Temp: 97.5 °F (36.4 °C)   TempSrc: Temporal   SpO2: 95%   Weight: 112 kg (247 lb 11.2 oz)   Height: 193 cm (75.98\")   PainSc: 0-No pain     Current Status 10/18/2021   ECOG score 1       Physical Exam   GENERAL:  Well-developed, well-nourished in no acute distress.  Seen today seated in a wheelchair  SKIN:  Warm, dry without rashes, purpura or petechiae.  HEAD:  Normocephalic.  EYES:  Pupils equal, round.  EOMs intact.  Conjunctivae normal.  CHEST:  Lungs clear to auscultation. Good airflow.  CARDIAC: Regular rhythm, though tachycardia without murmurs. Normal S1,S2.  ABDOMEN:  Soft, nontender. Bowel sounds present  EXTREMITIES:  No clubbing, cyanosis or edema.  NEUROLOGICAL: No focal neurological deficits.  PSYCHIATRIC:  Normal affect and mood.    I have reexamined the patient and the results are consistent with " the previously documented exam. NASREEN Thomas       RECENT LABS:  Results from last 7 days   Lab Units 10/18/21  1419 10/16/21  1104 10/15/21  0827   WBC 10*3/mm3 7.80 10.32 13.40*   NEUTROS ABS 10*3/mm3 7.36* 9.91* 12.79*   HEMOGLOBIN g/dL 10.7* 7.2* 7.7*   HEMATOCRIT % 33.6* 22.6* 24.9*   PLATELETS 10*3/mm3 140 149 178     Results from last 7 days   Lab Units 10/18/21  1419 10/16/21  1104 10/15/21  0827   SODIUM mmol/L 135 139 135*   POTASSIUM mmol/L 4.0 3.3* 3.5   CHLORIDE mmol/L 93* 96* 93*   CO2 mmol/L 28.2 25.9 27.2   BUN mg/dL 18 21 22   CREATININE mg/dL 0.99 1.08 1.01   CALCIUM mg/dL 9.8 10.6* 11.1*   ALBUMIN g/dL 3.80 3.50 3.50   BILIRUBIN mg/dL 0.3 0.2 0.2   ALK PHOS U/L 62 57 75   ALT (SGPT) U/L 18 18 21   AST (SGOT) U/L 38 25 29   GLUCOSE mg/dL 98 106* 124*   MAGNESIUM mg/dL  --  2.1 2.2           Assessment/plan:    1.  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  Clinical exam with no adenopathy, no B symptoms  · CT-guided biopsy 11/3/2020 with pathology documenting follicular lymphoma, grade 3A, c-MYC negative, BCL2 negative, BCL6 negative. The Ki-67 tumor is 30%.   · PET scan 11/5/2020 with intensely hypermetabolic bulky left mesenteric mass.  There is also metabolic lymphadenopathy in the retroperitoneal, retrodiaphragmatic, epicardial fat pad and right supraclavicular region.  · Baseline echocardiogram 11/12/2020 with ejection fraction of 56.9%  · Mediport placed 11/16/2020  · Cycle 1 CHOP Rituxan 11/17/2020  · CT scans following 2 cycles of CHOP Rituxan with response and interval decrease in the size of the large mesenteric mass  · PET scan 2/3/2021 following 4 cycles CHOP Rituxan with near complete resolution of all sites of disease.  Minimal persistent activity at the central aspect of the  · Completed 6 cycles of CHOP Rituxan March 2021  · Progression of disease, treated at  [facility] with 2 cycles of RICE chemotherapy and subsequent CAR-T therapy  · Unfortunately, he progressed through this as well  · Biopsy performed at [facility] documents diffuse large B cell lymphoma double hit.  · Completed radiation therapy at [facility]  · PET scan with progressive disease within the chest  · Initiation of bendamustine, Rituxan,Polivy 10/14/2021    2.  Comorbidities including hypertension, history of hyperlipidemia, history of gout and hyperuricemia.    3.  Anemia secondary to lymphoma  · Hemoglobin declined at 7.7 Friday, 10/15/2021  · Further decline at 7.2 10/16/2021 patient was transfused 2 units of packed red blood cells    4.  High risk for tumor lysis  · Currently continuing on allopurinol 300 mg daily  · Did receive additional IV fluid support with 1L normal saline normal saline day 2 and 3 following chemotherapy    5.  Orthopnea  · Following blood transfusion this past weekend, the patient reports shortness of breath when lying flat with wheezing and coughing  · He is without shortness of breath when sitting up  · He is noted to have gained 10 pounds which I suspect is volume overload  · We'll provide 20 mg IV Lasix in the infusion area today.  BNP pending.    PLAN:  1. Proceed with 20 mg IV Lasix in the infusion area today  2. BNP pending today  3. 20 mg oral Lasix at home tomorrow  4. Continue allopurinol 300 mg daily  5. The patient was encouraged to take Protonix at bedtime rather than in the morning  6. Return Wednesday, 10/20/2021 with CBC, CMP, LDH, magnesium, phosphorus and uric acid and nurse practitioner follow-up  7. Prior to nurse practitioner follow-up on Wednesday the patient will have a chest x-ray    Patient continues on high risk medication requiring close monitoring for toxicity days plan of care was discussed and reviewed with Dr. Rodriges who is in agreement    Lucita William, NASREEN  10/18/2021

## 2021-10-20 NOTE — PROGRESS NOTES
Subjective     REASON FOR FOLLOW UP:  Large abdominal mass most likely lymphoma: biopsy shows grade 3 follicular lymphoma stage IV by PET scan    HISTORY OF PRESENT ILLNESS:    The patient is a 61 y.o. male with the above-mentioned history, who returns the office today for short follow-up.  He was evaluated on Monday, 10/18/2021 with reports of shortness of breath particularly when lying flat and cough.  He did receive IV Lasix in the infusion area on Monday as well as home Lasix Tuesday and Wednesday.  He reports today he is feeling improved with less shortness of breath when lying flat.  He notes overall improvement in his appetite and intake, drinking plenty of fluids.  He does struggle with constipation following chemotherapy.  He utilizes milk of magnesia along with Senokot-S which provides benefit in his bowels.  He denies fevers or chills, signs or symptoms of infection.  He is overall pleased with his improvement compared to Monday.     ONCOLOGIC HISTORY:The patient is a 60 y.o. year old male who is here for an opinion about the above issue.  I had the opportunity to see this delightful individual in consultation along with his wife today in the office a 60-year-old white male who came to Mr. James Epley, APRN, at [facility] for routine assessment every 6 month visit and upon clinical examination he was found to have a very large abdominal mass. Obviously a CT scan was performed that documents a very large mass that is close to the stomach independent of the spleen, pancreas, left kidney and bowel and probably representing a conglomerate of masses and tumors that probably represents a non-Hodgkin's lymphoma. The patient states that he has had some early satiety in the last several months and he is not eating as much as he used to. He also has mild element of constipation, his bowel movement is harder than usual once or twice a day. He has not seen any passage of blood in the stool. He denies  emphatically any abdominal pain, sensation of fullness and he denies any fever, chills. A few nights ago he had some night sweats but he assumes it is because he had too many blankets on him. He has not had any pruritus. He does not feel fatigued. He denies any cough or sputum production, no shortness of breath, no pleuritic pain. He has not had any rashes in the skin. He denies any joint pain or bone pain. He denies any neurological symptomatology. He has no urinary complaints with no frequency, urgency or hematuria. He denies any other alterations at this time.     In [place] reviewed the patient back on 2020 with his wife in order to review the PET scan and the pathology report of the abdominal mass. 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 The Ki-67 tumor is 30%.     I reviewed with the patient and his wife the PET scan. This is very dramatic in regard to the SUV activity uptake in the large abdominal mass and also documents retroperitoneal adenopathy, compromise of the peritoneum and also compromise of the internal mammary nodes. There is right supraclavicular adenopathy. The PET scan is more than striking. eparation for definitive chemotherapy with CHOP and Rituxan I proposed to the patient and his wife the followin. He will need to have an echocardiogram before initiation of chemotherapy. I explained to him the reason behind that.   2. I want for him to remain on metoprolol that will be cardiac protection for the time being.   3. I would like for the patient to modify his dose of allopurinol to 300 mg a day starting as per today to prevent tumor lysis syndrome.  4. The patient will be educated about CHOP and Rituxan in the next day or so. I discussed with him side effects of the medicines including anemia, leukopenia, thrombocytopenia, peripheral neuropathy, allergic reactions, fever and chills  related to Rituxan administration.   5. I discussed with them the length of the 1st treatment that will require probably 6-7 hours in the office.  6. The patient has high risk of tumor lysis syndrome and for that reason he will come on day 2 and day 3 to receive 1000 cc of normal saline each one of those days and he will require BMP, uric acid, phosphorus and LDH each one of those days.   7. The patient will require Vascular Surgery consultation to proceed with port placement.   8. I indicated how a port is placed and I showed him the device.   9. The patient will be supported by Neulasta On-body and I explained to him how this device works and the side effects of Neulasta including bone pain that could be bad enough to require pain medicine.   10. I indicated to the patient that he will require chemotherapy treatment every 3 weeks for a total of 6 cycles and he will repeat a PET scan after cycle 2 and after cycle 6. He will have a CT scan after cycle 4.     The patient was further reviewed on 12/29/2020. He has completed already 2 cycles of chemotherapy. We scheduled him to have a PET scan that was not approved by his insurance and instead of this they approved a CT scan. I have reviewed the CT scan with the patient today and his wife. My personal interpretation shows in my opinion some disagreement with the radiologist’s interpretation. The radiologist only measured the mass in only 1 area. I measured the mass in volume and it is dramatically different. He has had probably a 75% reduction in the total volume of tumor in the left upper quadrant of his abdomen. We compared the tumor at different levels and different locations and different views. This is more obvious to me. He has normal bowel activity otherwise. His gallbladder, liver and spleen remain normal. Pancreas and kidneys remain normal. 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 In the chest he had resolution of both  "his sites of disease if we compare this with the PET scan that was done at the time of the diagnosis.     I do not believe that the patient has encountered any significant side effects of the treatment with the exception of minimal anemia documented today with a hemoglobin of 11.6. His white count and his platelet count are normal. His chemistry profile is pending. The patient remains on allopurinol and he will stay on this medicine for the time being not only because he used to have gout and high uric acid before it is also the need for him to continue prophylaxis for tumor lysis even though doubt that this will be happening with the volume of tumor that is leftover.      Pet after 4th cycle near complete response, rec to continue and complete 6 cycles and recheck pet 2 mo after completion of all chemo; plans for cardio oncology referral and survivorship referral in the future    Family History   Problem Relation Age of Onset   • Rheum arthritis Brother    • Stroke Maternal Grandmother    • Cancer Paternal Grandmother         Objective     Vitals:    10/20/21 1443   BP: 118/87   Pulse: 93   Resp: 20   Temp: 98.2 °F (36.8 °C)   TempSrc: Temporal   SpO2: 96%   Weight: 109 kg (240 lb)   Height: 193 cm (75.98\")   PainSc: 0-No pain     Current Status 10/20/2021   ECOG score 3       Physical Exam   GENERAL:  Well-developed, well-nourished in no acute distress.  Seen today seated in a wheelchair  SKIN:  Warm, dry without rashes, purpura or petechiae.  HEAD:  Normocephalic.  EYES:  Pupils equal, round.  EOMs intact.  Conjunctivae normal.  CHEST:  Lungs clear to auscultation. Good airflow.  CARDIAC: Regular rhythm, though tachycardia without murmurs. Normal S1,S2.  ABDOMEN:  Soft, nontender. Bowel sounds present  EXTREMITIES:  No clubbing, cyanosis or edema.  NEUROLOGICAL: No focal neurological deficits.  PSYCHIATRIC:  Normal affect and mood.    I have reexamined the patient and the results are consistent with the " previously documented exam. Lucita William, NASREEN       RECENT LABS:  Results from last 7 days   Lab Units 10/20/21  1437 10/18/21  1419 10/16/21  1104   WBC 10*3/mm3 5.02 7.80 10.32   NEUTROS ABS 10*3/mm3 4.56 7.36* 9.91*   HEMOGLOBIN g/dL 9.5* 10.7* 7.2*   HEMATOCRIT % 30.7* 33.6* 22.6*   PLATELETS 10*3/mm3 123* 140 149     Results from last 7 days   Lab Units 10/20/21  1437 10/18/21  1419 10/16/21  1104 10/15/21  0827 10/15/21  0827   SODIUM mmol/L 134 135 139   < > 135*   POTASSIUM mmol/L 3.4* 4.0 3.3*   < > 3.5   CHLORIDE mmol/L 90* 93* 96*   < > 93*   CO2 mmol/L 27.2 28.2 25.9   < > 27.2   BUN mg/dL 10 18 21   < > 22   CREATININE mg/dL 0.88 0.99 1.08   < > 1.01   CALCIUM mg/dL 9.0 9.8 10.6*   < > 11.1*   ALBUMIN g/dL 3.60 3.80 3.50   < > 3.50   BILIRUBIN mg/dL 0.2 0.3 0.2   < > 0.2   ALK PHOS U/L 59 62 57   < > 75   ALT (SGPT) U/L 17 18 18   < > 21   AST (SGOT) U/L 32 38 25   < > 29   GLUCOSE mg/dL 100 98 106*   < > 124*   MAGNESIUM mg/dL  --   --  2.1  --  2.2    < > = values in this interval not displayed.         XR Chest PA & Lateral (10/20/2021 14:13)  X-ray performed which I personally reviewed the images without acute processes, small right pleural effusion noted    Assessment/plan:    1.  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  Clinical exam with no adenopathy, no B symptoms  · CT-guided biopsy 11/3/2020 with pathology documenting follicular lymphoma, grade 3A, c-MYC negative, BCL2 negative, BCL6 negative. The Ki-67 tumor is 30%.   · PET scan 11/5/2020 with intensely hypermetabolic bulky left mesenteric mass.  There is also metabolic lymphadenopathy in the retroperitoneal, retrodiaphragmatic, epicardial fat pad and right supraclavicular region.  · Baseline echocardiogram 11/12/2020 with ejection fraction of 56.9%  · Mediport placed 11/16/2020  · Cycle 1 CHOP Rituxan 11/17/2020  · CT scans  following 2 cycles of CHOP Rituxan with response and interval decrease in the size of the large mesenteric mass  · PET scan 2/3/2021 following 4 cycles CHOP Rituxan with near complete resolution of all sites of disease.  Minimal persistent activity at the central aspect of the  · Completed 6 cycles of CHOP Rituxan March 2021  · Progression of disease, treated at [facility] with 2 cycles of RICE chemotherapy and subsequent CAR-T therapy  · Unfortunately, he progressed through this as well  · Biopsy performed at [facility] documents diffuse large B cell lymphoma double hit.  · Completed radiation therapy at [facility]  · PET scan with progressive disease within the chest  · Initiation of bendamustine, Rituxan,Polivy 10/14/2021  · ?  Additional CAR-T therapy following cycle 1.  The patient is scheduled for follow-up at [place]    2.  Comorbidities including hypertension, history of hyperlipidemia, history of gout and hyperuricemia.    3.  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  High risk for tumor lysis  · Currently continuing on allopurinol 300 mg daily  · Did receive additional IV fluid support with 1L normal saline normal saline day 2 and 3 following chemotherapy  · The patient is maintaining oral adequate hydration    5.  Orthopnea  · Following blood transfusion this past weekend, the patient reports shortness of breath when lying flat with wheezing and coughing  · He is without shortness of breath when sitting up.  · He waited for triage Monday, 10/18/2021 receiving 20 mg IV Lasix along with 20 mg p.o. Tuesday and Wednesday  · X-ray performed today and reviewed without significant pulmonary edema  · Overall improvement in his symptoms today.  We will discontinue Lasix    PLAN:  1. Discontinue oral Lasix  2. Chest  x-ray performed today and reviewed with the patient and his wife.  3. Continue allopurinol 300 mg daily  4. The patient will continue to drink adequately to maintain oral hydration  5. The patient was encouraged to take Protonix at bedtime rather than in the morning  6. Return weekly for CBC, CMP, LDH, nurse practitioner follow-up  7. Follow-up with [facility] as scheduled  8. The patient would tentatively be due for his second cycle of chemotherapy 11/11/2021.  He will be scheduled to see Dr. Rodriges at that time.  This is pending [facility] recommendations and T-cell harvest.    Patient continues on high risk medication requiring close monitoring for toxicity days plan of care was discussed and reviewed with Dr. Rodriges who is in agreement    Lucita William, NASREEN  10/20/2021

## 2021-10-25 PROBLEM — N17.9 AKI (ACUTE KIDNEY INJURY) (HCC): Status: ACTIVE | Noted: 2021-01-01

## 2021-10-25 NOTE — PROGRESS NOTES
Subjective     REASON FOR FOLLOW UP: Double hit lymphoma    HISTORY OF PRESENT ILLNESS:    The patient is a 61 y.o. male with the above-mentioned history, who returns the office today for short follow-up.   Last evaluated 10/20/2020 at which time he did undergo a chest x-ray. At that time, he had completed a few days of Lasix due to volume overload. He was feeling much better last Wednesday. They called the office this morning with reports of worsening shortness of breath, abdominal distention and drenching sweats. He was therefore brought into the office for reevaluation. He reports he declined over the weekend. He is not able to lay flat due to significant shortness of breath. He is tachypneic with conversation. He reports a pain in his right lower chest wall which is worse with inspiration. He has significant weakness and fatigue. He has decreased intake. He reports his mouth feels very dry. He has been trying to drink fluids though admits he is not drinking adequately. He denies fevers or chills though has been having continuous drenching sweats. He was struggling with constipation but has bowels loosened with MiraLAX. He denies abdominal pain though does feel his abdomen is distended and hard. He feels as though he is tachycardic, feeling as though his heart is racing. He denies chest pain aside from the right lower rib pain.       ONCOLOGIC HISTORY:The patient is a 60 y.o. year old male who is here for an opinion about the above issue.  I had the opportunity to see this delightful individual in consultation along with his wife today in the office a 60-year-old white male who came to Mr. James Epley, APRN, at [facility] for routine assessment every 6 month visit and upon clinical examination he was found to have a very large abdominal mass. Obviously a CT scan was performed that documents a very large mass that is close to the stomach independent of the spleen, pancreas, left kidney and bowel and  probably representing a conglomerate of masses and tumors that probably represents a non-Hodgkin's lymphoma. The patient states that he has had some early satiety in the last several months and he is not eating as much as he used to. He also has mild element of constipation, his bowel movement is harder than usual once or twice a day. He has not seen any passage of blood in the stool. He denies emphatically any abdominal pain, sensation of fullness and he denies any fever, chills. A few nights ago he had some night sweats but he assumes it is because he had too many blankets on him. He has not had any pruritus. He does not feel fatigued. He denies any cough or sputum production, no shortness of breath, no pleuritic pain. He has not had any rashes in the skin. He denies any joint pain or bone pain. He denies any neurological symptomatology. He has no urinary complaints with no frequency, urgency or hematuria. He denies any other alterations at this time.     In Denise reviewed the patient back on 2020 with his wife in order to review the PET scan and the pathology report of the abdominal mass. 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 The Ki-67 tumor is 30%.     I reviewed with the patient and his wife the PET scan. This is very dramatic in regard to the SUV activity uptake in the large abdominal mass and also documents retroperitoneal adenopathy, compromise of the peritoneum and also compromise of the internal mammary nodes. There is right supraclavicular adenopathy. The PET scan is more than striking. eparation for definitive chemotherapy with CHOP and Rituxan I proposed to the patient and his wife the followin. He will need to have an echocardiogram before initiation of chemotherapy. I explained to him the reason behind that.   2. I want for him to remain on metoprolol that will be cardiac protection for  the time being.   3. I would like for the patient to modify his dose of allopurinol to 300 mg a day starting as per today to prevent tumor lysis syndrome.  4. The patient will be educated about CHOP and Rituxan in the next day or so. I discussed with him side effects of the medicines including anemia, leukopenia, thrombocytopenia, peripheral neuropathy, allergic reactions, fever and chills related to Rituxan administration.   5. I discussed with them the length of the 1st treatment that will require probably 6-7 hours in the office.  6. The patient has high risk of tumor lysis syndrome and for that reason he will come on day 2 and day 3 to receive 1000 cc of normal saline each one of those days and he will require BMP, uric acid, phosphorus and LDH each one of those days.   7. The patient will require Vascular Surgery consultation to proceed with port placement.   8. I indicated how a port is placed and I showed him the device.   9. The patient will be supported by Neulasta On-body and I explained to him how this device works and the side effects of Neulasta including bone pain that could be bad enough to require pain medicine.   10. I indicated to the patient that he will require chemotherapy treatment every 3 weeks for a total of 6 cycles and he will repeat a PET scan after cycle 2 and after cycle 6. He will have a CT scan after cycle 4.     The patient was further reviewed on 12/29/2020. He has completed already 2 cycles of chemotherapy. We scheduled him to have a PET scan that was not approved by his insurance and instead of this they approved a CT scan. I have reviewed the CT scan with the patient today and his wife. My personal interpretation shows in my opinion some disagreement with the radiologist’s interpretation. The radiologist only measured the mass in only 1 area. I measured the mass in volume and it is dramatically different. He has had probably a 75% reduction in the total volume of tumor in the left  "upper quadrant of his abdomen. We compared the tumor at different levels and different locations and different views. This is more obvious to me. He has normal bowel activity otherwise. His gallbladder, liver and spleen remain normal. Pancreas and kidneys remain normal. 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 In the chest he had resolution of both his sites of disease if we compare this with the PET scan that was done at the time of the diagnosis.     I do not believe that the patient has encountered any significant side effects of the treatment with the exception of minimal anemia documented today with a hemoglobin of 11.6. His white count and his platelet count are normal. His chemistry profile is pending. The patient remains on allopurinol and he will stay on this medicine for the time being not only because he used to have gout and high uric acid before it is also the need for him to continue prophylaxis for tumor lysis even though doubt that this will be happening with the volume of tumor that is leftover.      Pet after 4th cycle near complete response, rec to continue and complete 6 cycles and recheck pet 2 mo after completion of all chemo; plans for cardio oncology referral and survivorship referral in the future    Family History   Problem Relation Age of Onset   • Rheum arthritis Brother    • Stroke Maternal Grandmother    • Cancer Paternal Grandmother         Objective     Vitals:    10/25/21 1012   BP: 121/87   Pulse: (!) 137   Resp: 18   Temp: 98.2 °F (36.8 °C)   TempSrc: Oral   SpO2: 95%   Weight: 106 kg (233 lb 14.4 oz)   Height: 193 cm (75.98\")   PainSc: 0-No pain     Current Status 10/25/2021   ECOG score 1       Physical Exam   GENERAL:  Well-developed, well-nourished in no acute distress.  Seen today seated in a wheelchair, diaphoretic  SKIN:  Warm, dry without rashes, purpura or petechiae.  HEAD:  Normocephalic.  EYES:  Pupils equal, round.  EOMs intact.  " Conjunctivae normal.  CHEST: Visibly tachypneic with conversation with decreased breath sounds in the right entire lung  CARDIAC: tachycardia without murmurs. Normal S1,S2.  ABDOMEN: Bowel sounds are present, abdomen is distended, hardened, no fluid wave.  Exam limited as the patient is seated in wheelchair it is difficult to appreciate hepatosplenomegaly  EXTREMITIES:  No clubbing, cyanosis or edema.  NEUROLOGICAL: No focal neurological deficits.  PSYCHIATRIC:  Normal affect and mood.      I have reexamined the patient and the results are consistent with the previously documented exam. NASREEN Thomas       RECENT LABS:  Results from last 7 days   Lab Units 10/25/21  0955 10/20/21  1437 10/18/21  1419   WBC 10*3/mm3 11.26* 5.02 7.80   NEUTROS ABS 10*3/mm3 10.46* 4.56 7.36*   HEMOGLOBIN g/dL 10.7* 9.5* 10.7*   HEMATOCRIT % 35.1* 30.7* 33.6*   PLATELETS 10*3/mm3 141 123* 140     Results from last 7 days   Lab Units 10/25/21  0955 10/20/21  1437 10/18/21  1419   SODIUM mmol/L 136 134 135   POTASSIUM mmol/L 4.3 3.4* 4.0   CHLORIDE mmol/L 89* 90* 93*   CO2 mmol/L 21.4* 27.2 28.2   BUN mg/dL 26* 10 18   CREATININE mg/dL 1.60* 0.88 0.99   CALCIUM mg/dL 9.6 9.0 9.8   ALBUMIN g/dL 3.70 3.60 3.80   BILIRUBIN mg/dL 0.4 0.2 0.3   ALK PHOS U/L 62 59 62   ALT (SGPT) U/L 17 17 18   AST (SGOT) U/L 38 32 38   GLUCOSE mg/dL 102 100 98          IMAGING:  CT Abdomen Pelvis With Contrast (10/25/2021 11:20)  CT Angiogram Chest With & Without Contrast (10/25/2021 11:20)      Assessment/plan:    1.  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  Clinical exam with no adenopathy, no B symptoms  · CT-guided biopsy 11/3/2020 with pathology documenting follicular lymphoma, grade 3A, c-MYC negative, BCL2 negative, BCL6 negative. The Ki-67 tumor is 30%.   · PET scan 11/5/2020 with intensely hypermetabolic bulky left mesenteric  mass.  There is also metabolic lymphadenopathy in the retroperitoneal, retrodiaphragmatic, epicardial fat pad and right supraclavicular region.  · Baseline echocardiogram 11/12/2020 with ejection fraction of 56.9%  · Mediport placed 11/16/2020  · Cycle 1 CHOP Rituxan 11/17/2020  · CT scans following 2 cycles of CHOP Rituxan with response and interval decrease in the size of the large mesenteric mass  · PET scan 2/3/2021 following 4 cycles CHOP Rituxan with near complete resolution of all sites of disease.  Minimal persistent activity at the central aspect of the  · Completed 6 cycles of CHOP Rituxan March 2021  · Progression of disease, treated at [facility] with 2 cycles of RICE chemotherapy and subsequent CAR-T therapy  · Unfortunately, he progressed through this as well  · Biopsy performed at [facility] documents diffuse large B cell lymphoma double hit.  · Completed radiation therapy at [facility]  · PET scan with progressive disease within the chest  · Initiation of bendamustine, Rituxan,Polivy 10/14/2021  · ?  Additional CAR-T therapy following cycle 1.  The patient is scheduled for follow-up at [facility]  · Scans as outlined above with progressive disease noted, new pleural effusion, enlarging mesenteric mass, pleural lymphadenopathy, new hepatic lesion. Dr. Rodriges has reached out to Dr. Clark at [facility] to determine if there are other treatment options or we should proceed with palliative care    2.  Comorbidities including hypertension, history of hyperlipidemia, history of gout and hyperuricemia.    3.  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  High risk for tumor lysis  · Currently continuing on allopurinol 300 mg daily    5.  Acute Shortness of breath  · Evaluation 10/18/2021 initially thought  to be volume overload with aggressive hydration. Chest x-ray was performed 10/20/2021 with a small effusion noted though no acute abnormalities  · Significant worsening of shortness of breath particularly when laying flat prompting stat CT angiogram today, 10/25/2021 with large right pleural effusion noted, progressive adenopathy in the chest    6. Abdominal distention  · Stat CT of the abdomen performed today with progressive disease of the abdomen and complex ascites noted.    7. MANUELA:  · Creatinine increased to 1.6 small amount of fluid given in the infusion area today, 100 mL normal saline. It is unclear if this is related to the extensive complex ascites versus dehydration      PLAN:  1. Stat CT of the chest abdomen pelvis (CT angiogram) performed today for acute shortness of breath, abdominal distention  2. 500 mL normal saline given in the infusion area today  3. Scans are reviewed with the patient and his wife with progressive disease noted. We will proceed with direct admission to [facility] through [facility], Dr. Farias for symptom management right pleural effusion and progressive lymphoma  4. I spoke with Dr. Rodriges who will reach out to [facility] to determine if there is additional treatment options for his lymphoma versus palliative care  5. Dr. Regalado, [facility] physician was updated    I spent 85 minutes caring for Jake on this date of service. This time includes time spent by me in the following activities: preparing for the visit, reviewing tests, obtaining and/or reviewing a separately obtained history, performing a medically appropriate examination and/or evaluation, counseling and educating the patient/family/caregiver, ordering medications, tests, or procedures, referring and communicating with other health care professionals, documenting information in the medical record, independently interpreting results and communicating that information with the patient/family/caregiver  and care coordination.     Lucita William, APRN  10/25/2021

## 2021-10-25 NOTE — TELEPHONE ENCOUNTER
Spoke with GARDENIA Landrum about patient and she gave permission for patient to be added to her schedule today 10/25/2021

## 2021-10-25 NOTE — TELEPHONE ENCOUNTER
Caller: ADE     Relationship to patient: SELF     Best call back number: [phone]    PATIENT IS ASKING TO BE SEEN TODAY. HE STATED THAT HE HAS TOO MUCH FLUID AND IT IS MAKING IT VERY HARD TO BREATH AND SLEEP. HE HAS NO APPETITE, COLD SWEATS,NO STAMINA,  AND VERY DRY MOUTH. HE IS SHORT OF BREATH.     PLEASE CALL AND ADVISE.   THANK YOU

## 2021-10-26 PROBLEM — J91.0 MALIGNANT PLEURAL EFFUSION: Status: ACTIVE | Noted: 2021-01-01

## 2021-10-26 PROBLEM — F41.9 ANXIETY: Status: ACTIVE | Noted: 2021-01-01

## 2021-10-26 NOTE — OUTREACH NOTE
Prep Survey      Responses   [facility] patient discharged from? [place]   Is LACE score < 7 ? No   Emergency Room discharge w/ pulse ox? No   Eligibility [facility]   Date of Admission 10/25/21   Date of Discharge 10/26/21   Discharge Disposition Home or Self Care   Discharge diagnosis right-sided thoracentesis, lymphoma   Does the patient have one of the following disease processes/diagnoses(primary or secondary)? Other   Does the patient have Home health ordered? No   Is there a DME ordered? No   Prep survey completed? Yes          Pao Ackerman RN

## 2021-10-27 NOTE — TELEPHONE ENCOUNTER
Caller: Jake Saunders    Relationship: Self    Best call back number: [phone]    What medications are you currently taking:   Current Outpatient Medications on File Prior to Visit   Medication Sig Dispense Refill   • allopurinol (ZYLOPRIM) 300 MG tablet Take 300 mg by mouth Daily.     • allopurinol (ZYLOPRIM) 300 MG tablet Take 300 mg by mouth Daily.     • ALPRAZolam (XANAX) 0.25 MG tablet Take 1 tablet by mouth 3 (Three) Times a Day As Needed for Anxiety or Sleep. 9 tablet 0   • furosemide (LASIX) 20 MG tablet Take 1 tablet by mouth Daily. 10 tablet 0   • Loratadine (CLARITIN PO) Take  by mouth Daily.     • loratadine (CLARITIN) 10 MG tablet Take 10 mg by mouth Daily.     • metoprolol tartrate (LOPRESSOR) 25 MG tablet Take 1 tablet by mouth 2 (Two) Times a Day. (Patient taking differently: Take 25 mg by mouth 2 (Two) Times a Day. Patient takes this at night) 60 tablet 3   • multivitamin with minerals (MULTIVITAMIN ADULT PO) Take 1 tablet by mouth Daily.     • olmesartan-hydrochlorothiazide (BENICAR HCT) 20-12.5 MG per tablet Take 1 tablet by mouth Daily.     • ondansetron (ZOFRAN) 8 MG tablet      • pantoprazole (PROTONIX) 40 MG EC tablet Take 1 tablet by mouth Daily. (Patient taking differently: Take 40 mg by mouth Daily. Patient takes this at night) 30 tablet 1   • sulfamethoxazole-trimethoprim (BACTRIM DS,SEPTRA DS) 800-160 MG per tablet      • valACYclovir (VALTREX) 500 MG tablet Take 500 mg by mouth 2 (Two) Times a Day. Prescribed per [facility]     • valACYclovir (VALTREX) 500 MG tablet Take 500 mg by mouth.     • Vitamin D, Cholecalciferol, 25 MCG (1000 UT) capsule Take  by mouth Daily.       Current Facility-Administered Medications on File Prior to Visit   Medication Dose Route Frequency Provider Last Rate Last Admin   • [COMPLETED] albumin human 25 % IV SOLN 25 g  25 g Intravenous Once Lui Farias MD   Stopped at 10/26/21 1330   • [DISCONTINUED] acetaminophen (TYLENOL) 160 MG/5ML  solution 650 mg  650 mg Oral Q4H PRN Lui Farias MD       • [DISCONTINUED] acetaminophen (TYLENOL) suppository 650 mg  650 mg Rectal Q4H PRN Lui Farias MD       • [DISCONTINUED] acetaminophen (TYLENOL) tablet 650 mg  650 mg Oral Q4H PRN Lui Farias MD       • [DISCONTINUED] albumin human 25 % IV SOLN 25 g  25 g Intravenous Once Lui Farias MD       • [DISCONTINUED] allopurinol (ZYLOPRIM) tablet 300 mg  300 mg Oral Daily Lui Farias MD   300 mg at 10/26/21 1058   • [DISCONTINUED] ALPRAZolam (XANAX) tablet 0.125 mg  0.125 mg Oral Q8H PRN Marcelo Regalado MD   0.125 mg at 10/25/21 2259   • [DISCONTINUED] bisacodyl (DULCOLAX) EC tablet 5 mg  5 mg Oral Daily PRN Lui Farias MD       • [DISCONTINUED] bisacodyl (DULCOLAX) suppository 10 mg  10 mg Rectal Daily PRN Lui Farias MD       • [DISCONTINUED] cetirizine (zyrTEC) tablet 10 mg  10 mg Oral Daily Lui aFrias MD   10 mg at 10/26/21 1058   • [DISCONTINUED] heparin injection 500 Units  5 mL Intravenous PRN Perri Delong APRN   500 Units at 10/26/21 1649   • [DISCONTINUED] Hold medication  1 each Does not apply Continuous PRN Lui Farias MD       • [DISCONTINUED] HYDROcodone-acetaminophen (NORCO) 5-325 MG per tablet 1 tablet  1 tablet Oral Q4H PRN Lui Farias MD       • [DISCONTINUED] HYDROmorphone (DILAUDID) injection 0.5 mg  0.5 mg Intravenous Q2H PRN Lui Farias MD       • [DISCONTINUED] ipratropium-albuterol (DUO-NEB) nebulizer solution 3 mL  3 mL Nebulization Q4H PRN Perri Delong, APRJACK       • [DISCONTINUED] metoprolol tartrate (LOPRESSOR) tablet 25 mg  25 mg Oral BID Lui Farias MD   25 mg at 10/26/21 1058   • [DISCONTINUED] naloxone (NARCAN) injection 0.4 mg  0.4 mg Intravenous Q5 Min PRN Lui Farias MD       • [DISCONTINUED] ondansetron (ZOFRAN) injection 4 mg  4 mg Intravenous Q6H PRN Lui Farias MD   4 mg at 10/25/21 1918   •  [DISCONTINUED] ondansetron (ZOFRAN) tablet 4 mg  4 mg Oral Q6H PRN Lui Farias MD       • [DISCONTINUED] pantoprazole (PROTONIX) EC tablet 40 mg  40 mg Oral QAM Lui Farias MD   40 mg at 10/26/21 0650   • [DISCONTINUED] Pharmacy to Dose Zosyn   Does not apply Continuous PRN Lui Farias MD       • [DISCONTINUED] piperacillin-tazobactam (ZOSYN) 3.375 g in iso-osmotic dextrose 50 ml (premix)  3.375 g Intravenous Q8H Lui Farias MD   Currently Infusing at 10/26/21 1618   • [DISCONTINUED] polyethylene glycol (MIRALAX) packet 17 g  17 g Oral Daily PRN Lui Farias MD       • [DISCONTINUED] sennosides-docusate (PERICOLACE) 8.6-50 MG per tablet 2 tablet  2 tablet Oral BID Lui Farias MD   2 tablet at 10/26/21 1058   • [DISCONTINUED] sodium chloride 0.9 % bolus 1,000 mL  1,000 mL Intravenous Once Lui Farias MD       • [DISCONTINUED] sodium chloride 0.9 % flush 10 mL  10 mL Intravenous Q12H Lui Farias MD   10 mL at 10/26/21 1059   • [DISCONTINUED] sodium chloride 0.9 % flush 10 mL  10 mL Intravenous PRN Lui Farias MD       • [DISCONTINUED] sodium chloride 0.9 % flush 10 mL  10 mL Intravenous Q12H Perri Delong APRN       • [DISCONTINUED] sodium chloride 0.9 % flush 10 mL  10 mL Intravenous PRN Perri Delong APRN       • [DISCONTINUED] sodium chloride 0.9 % flush 20 mL  20 mL Intravenous PRN Perri Delong APRN       • [DISCONTINUED] valACYclovir (VALTREX) tablet 500 mg  500 mg Oral Q12H Lui Farias MD   500 mg at 10/26/21 1058          When did you start taking these medications: SINCE AUGUST WHEN HE WAS SEEN AT [place]     Which medication are you concerned about: VALTREX    Who prescribed you this medication: DR. JORDAN    What are your concerns: PATIENT WILL RUN OUT OF THIS MEDICATION BY Friday NIGHT.  SPOUSE WOULD LIKE TO KNOW IF DR. DENNIS CAN FILL THIS RX FOR PATIENT?     [facility] [address] IN [employer] - [place], [place] - [address]  [address] [facility] [address] - [phone]  - [phone] FX

## 2021-10-27 NOTE — TELEPHONE ENCOUNTER
PT CALLED. HE WAS SPEAKING WITH SOMEONE REGARDING SECOND COVID TEST PRIOR TO PROCEDURE HE WAS HAVING. HE WAS DISCONNECTED AND RETURNING CALL.     WARM TRANSFERRED WITH MIR ON NON CLINICAL LINE TO FURTHER ASSIST.

## 2021-10-27 NOTE — TELEPHONE ENCOUNTER
Caller: Jake Saunders    Relationship: Self      Requested Prescriptions:   Requested Prescriptions     Signed Prescriptions Disp Refills   • valACYclovir (VALTREX) 500 MG tablet 60 tablet 3     Sig: Take 1 tablet by mouth 2 (Two) Times a Day.     Authorizing Provider: ANNETTE DENNIS     Ordering User: SUMA GONSALES        Pharmacy where request should be sent:   [facility]    Additional details provided by patient: [facility] TO FILL [facility].    Best call back number: [phone]    Does the patient have less than a 3 day supply:  [x] Yes  [] No    David Miller Rep   10/27/21 15:40 EDT

## 2021-10-27 NOTE — OUTREACH NOTE
Call Center TCM Note      Responses   [facility] patient discharged from? [place]   Does the patient have one of the following disease processes/diagnoses(primary or secondary)? Other   TCM attempt successful? Yes   Call start time 1456   Call end time 1458   Discharge diagnosis right-sided thoracentesis, lymphoma   Is patient permission given to speak with other caregiver? Yes   List who call center can speak with spouse- Aubrie   Person spoke with today (if not patient) and relationship spouse   Meds reviewed with patient/caregiver? Yes   Is the patient having any side effects they believe may be caused by any medication additions or changes? No   Does the patient have all medications ordered at discharge? Yes   Is the patient taking all medications as directed (includes completed medication regime)? Yes   Does the patient have a primary care provider?  Yes   Does the patient have an appointment with their PCP within 7 days of discharge? No   Comments regarding PCP patient will be following up with his oncologist   Has the patient kept scheduled appointments due by today? Yes   Has home health visited the patient within 72 hours of discharge? N/A   Psychosocial issues? No   Did the patient receive a copy of their discharge instructions? Yes   Nursing interventions Reviewed instructions with patient  [with spouse]   What is the patient's perception of their health status since discharge? Improving   Is the patient/caregiver able to teach back the hierarchy of who to call/visit for symptoms/problems? PCP, Specialist, Home health nurse, Urgent Care, ED, 911 Yes   TCM call completed? Yes   Wrap up additional comments Per spouse, patient is doing well, no questions or concerns at this time, they have just left one of patients appts, he will be following up with his oncologist.          Phyllis Lopez RN    10/27/2021, 14:59 EDT

## 2021-10-27 NOTE — TELEPHONE ENCOUNTER
----- Message from Marcelo Regalado MD sent at 10/26/2021  3:33 PM EDT -----  Please set up for outpatient paracentesis and chest x-ray and possible thoracentesis on Friday with CBC    See Dr. Nance on Monday or Tuesday next week

## 2021-10-29 NOTE — TELEPHONE ENCOUNTER
Caller: Abdi English    Relationship: Emergency Contact    Best call back number: [phone]   What is the best time to reach you: ANYTIME    Who are you requesting to speak with (clinical staff, provider,  specific staff member): DR DENNIS OR NURSE    Do you know the name of the person who called: ABDI ENGLISH    What was the call regarding: WIFE ABDI CALLED REGARDING PT RASHAD ENGLISH. HE HAD A PARACENTESIS EARLIER TODAY AND HAS HAD 3 EPISODES OF VOMITING - ONE THIS MORNING AND THEN 2 THIS AFTERNOON. THE COLOR OF THE VOMIT WAS GREEN AND IS NOW FLUCTUATING TO A YELLOW BROWNISH COLOR. THEY HAVE ZOFRAN AND COMPAZINE AT HOME, WANTING TO KNOW IF THAT IS OKAY OR IF HE NEEDS SOMETHING ELSE.     Do you require a callback: YES. CAN LEAVE A VM IF NO ANSWER. PER REQUEST, LEAVE NAME/NUMBER IF MISSES THE CALL.

## 2021-11-01 NOTE — PROGRESS NOTES
Subjective     REASON FOR FOLLOW UP: Double hit lymphoma    HISTORY OF PRESENT ILLNESS:   The patient is a 61 y.o. male with the above-mentioned history, who returns the office today for triage visit.    The patient was hospitalized from 10/25/2021 through 10/26/2021 for acute onset shortness of breath and right-sided chest pain with deep inspiration.  The patient had stat CT of the chest, abdomen, and pelvis which revealed worsening lymphoma along with new right pleural effusion and ascites, as well as a new liver lesion.  During this hospitalization, the patient received a paracentesis and thoracentesis.  He had 1500 mL removed from his pleural cavity and 2 L removed from the abdomen.  Possible treatment options were discussed with the patient including palliative care versus Gemzar/Navelbine plus dexamethasone.  Patient wished to return home and discuss with his wife.    On 10/29/2021, patient was scheduled for another thoracentesis and paracentesis.  1750 mL of ascites removed, minimal pleural fluid present so no thoracentesis performed.    Today, the patient returns for triage visit.  He is seen in a wheelchair with his wife present.  The patient's wife called asking if the patient could receive IV fluids today.  Since Thursday, the patient has been struggling with severe nausea and vomiting.  He has been alternating Zofran 8 mg every 8 hours and Phenergan suppository every 8 hours, and has still not been able to keep anything down.  He reports he has not eaten since Thursday, and has minimal fluid intake.  He feels extremely fatigued and weak.  He continues to experience shortness of breath, only on exertion though.  He denies constipation or diarrhea.  He denies fever or chills.  He denies any new or worsening pain today.  He denies chest pain.     ONCOLOGIC HISTORY:The patient is a 60 y.o. year old male who is here for an opinion about the above issue.  I had the opportunity to see this delightful  individual in consultation along with his wife today in the office a 60-year-old white male who came to Mr. James Epley, APRN, at [facility] for routine assessment every 6 month visit and upon clinical examination he was found to have a very large abdominal mass. Obviously a CT scan was performed that documents a very large mass that is close to the stomach independent of the spleen, pancreas, left kidney and bowel and probably representing a conglomerate of masses and tumors that probably represents a non-Hodgkin's lymphoma. The patient states that he has had some early satiety in the last several months and he is not eating as much as he used to. He also has mild element of constipation, his bowel movement is harder than usual once or twice a day. He has not seen any passage of blood in the stool. He denies emphatically any abdominal pain, sensation of fullness and he denies any fever, chills. A few nights ago he had some night sweats but he assumes it is because he had too many blankets on him. He has not had any pruritus. He does not feel fatigued. He denies any cough or sputum production, no shortness of breath, no pleuritic pain. He has not had any rashes in the skin. He denies any joint pain or bone pain. He denies any neurological symptomatology. He has no urinary complaints with no frequency, urgency or hematuria. He denies any other alterations at this time.     In Denise reviewed the patient back on 11/12/2020 with his wife in order to review the PET scan and the pathology report of the abdominal mass. 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 The Ki-67 tumor is 30%.     I reviewed with the patient and his wife the PET scan. This is very dramatic in regard to the SUV activity uptake in the large abdominal mass and also documents retroperitoneal adenopathy, compromise of the peritoneum and also  compromise of the internal mammary nodes. There is right supraclavicular adenopathy. The PET scan is more than striking. eparation for definitive chemotherapy with CHOP and Rituxan I proposed to the patient and his wife the followin. He will need to have an echocardiogram before initiation of chemotherapy. I explained to him the reason behind that.   2. I want for him to remain on metoprolol that will be cardiac protection for the time being.   3. I would like for the patient to modify his dose of allopurinol to 300 mg a day starting as per today to prevent tumor lysis syndrome.  4. The patient will be educated about CHOP and Rituxan in the next day or so. I discussed with him side effects of the medicines including anemia, leukopenia, thrombocytopenia, peripheral neuropathy, allergic reactions, fever and chills related to Rituxan administration.   5. I discussed with them the length of the 1st treatment that will require probably 6-7 hours in the office.  6. The patient has high risk of tumor lysis syndrome and for that reason he will come on day 2 and day 3 to receive 1000 cc of normal saline each one of those days and he will require BMP, uric acid, phosphorus and LDH each one of those days.   7. The patient will require Vascular Surgery consultation to proceed with port placement.   8. I indicated how a port is placed and I showed him the device.   9. The patient will be supported by Neulasta On-body and I explained to him how this device works and the side effects of Neulasta including bone pain that could be bad enough to require pain medicine.   10. I indicated to the patient that he will require chemotherapy treatment every 3 weeks for a total of 6 cycles and he will repeat a PET scan after cycle 2 and after cycle 6. He will have a CT scan after cycle 4.     The patient was further reviewed on 2020. He has completed already 2 cycles of chemotherapy. We scheduled him to have a PET scan that was not  approved by his insurance and instead of this they approved a CT scan. I have reviewed the CT scan with the patient today and his wife. My personal interpretation shows in my opinion some disagreement with the radiologist’s interpretation. The radiologist only measured the mass in only 1 area. I measured the mass in volume and it is dramatically different. He has had probably a 75% reduction in the total volume of tumor in the left upper quadrant of his abdomen. We compared the tumor at different levels and different locations and different views. This is more obvious to me. He has normal bowel activity otherwise. His gallbladder, liver and spleen remain normal. Pancreas and kidneys remain normal. 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 In the chest he had resolution of both his sites of disease if we compare this with the PET scan that was done at the time of the diagnosis.     I do not believe that the patient has encountered any significant side effects of the treatment with the exception of minimal anemia documented today with a hemoglobin of 11.6. His white count and his platelet count are normal. His chemistry profile is pending. The patient remains on allopurinol and he will stay on this medicine for the time being not only because he used to have gout and high uric acid before it is also the need for him to continue prophylaxis for tumor lysis even though doubt that this will be happening with the volume of tumor that is leftover.      Pet after 4th cycle near complete response, rec to continue and complete 6 cycles and recheck pet 2 mo after completion of all chemo; plans for cardio oncology referral and survivorship referral in the future    Family History   Problem Relation Age of Onset   • Rheum arthritis Brother    • Stroke Maternal Grandmother    • Cancer Paternal Grandmother         Objective     Vitals:    11/01/21 1004   BP: 130/91   Pulse: (!) 129   Resp: 16  "  Temp: 96.6 °F (35.9 °C)   TempSrc: Temporal   SpO2: 95%   Weight: Comment: pt not able to stand for WT   Height: 193 cm (75.98\")   PainSc: 0-No pain     Current Status 11/1/2021   ECOG score 2     Physical Exam   GENERAL:  Well-developed, ill-appearing, not in acute distress.  Seen today seated in a wheelchair, diaphoretic, weak.  SKIN:  Warm, dry without rashes, purpura or petechiae.  HEAD:  Normocephalic.  EYES:  Pupils equal, round.  EOMs intact.  Conjunctivae normal.  CHEST: Tachypneic with conversation with decreased breath sounds in the right entire lung  CARDIAC: tachycardia without murmurs. Normal S1,S2.  ABDOMEN: Bowel sounds are present, abdomen is distended, hardened, no fluid wave.  Exam limited as the patient is seated in wheelchair it is difficult to appreciate hepatosplenomegaly  EXTREMITIES:  No clubbing, cyanosis or edema.  NEUROLOGICAL: No focal neurological deficits.  PSYCHIATRIC:  Normal affect and mood.      I have reexamined the patient and the results are consistent with the previously documented exam. NASREEN Bourne       RECENT LABS:  Results from last 7 days   Lab Units 11/01/21  0952 10/26/21  0405   WBC 10*3/mm3 5.71 9.19   NEUTROS ABS 10*3/mm3 5.14 8.43*   HEMOGLOBIN g/dL 10.8* 9.4*   HEMATOCRIT % 33.9* 29.2*   PLATELETS 10*3/mm3 93* 117*     Results from last 7 days   Lab Units 10/26/21  0405   SODIUM mmol/L 135*   POTASSIUM mmol/L 3.6   CHLORIDE mmol/L 90*   CO2 mmol/L 23.1   BUN mg/dL 27*   CREATININE mg/dL 1.55*   CALCIUM mg/dL 8.5*   ALBUMIN g/dL 3.50   BILIRUBIN mg/dL 0.3   ALK PHOS U/L 52   ALT (SGPT) U/L 17   AST (SGOT) U/L 37   GLUCOSE mg/dL 95   MAGNESIUM mg/dL 2.4     Results from last 7 days   Lab Units 10/25/21  1537   INR  1.19*      IMAGING:  CT Abdomen Pelvis With Contrast (10/25/2021 11:20)  CT Angiogram Chest With & Without Contrast (10/25/2021 11:20)      Assessment/plan:    1.  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  Clinical exam with no adenopathy, no B symptoms  · CT-guided biopsy 11/3/2020 with pathology documenting follicular lymphoma, grade 3A, c-MYC negative, BCL2 negative, BCL6 negative. The Ki-67 tumor is 30%.   · PET scan 11/5/2020 with intensely hypermetabolic bulky left mesenteric mass.  There is also metabolic lymphadenopathy in the retroperitoneal, retrodiaphragmatic, epicardial fat pad and right supraclavicular region.  · Baseline echocardiogram 11/12/2020 with ejection fraction of 56.9%  · Mediport placed 11/16/2020  · Cycle 1 CHOP Rituxan 11/17/2020  · CT scans following 2 cycles of CHOP Rituxan with response and interval decrease in the size of the large mesenteric mass  · PET scan 2/3/2021 following 4 cycles CHOP Rituxan with near complete resolution of all sites of disease.  Minimal persistent activity at the central aspect of the  · Completed 6 cycles of CHOP Rituxan March 2021  · Progression of disease, treated at [facility] with 2 cycles of RICE chemotherapy and subsequent CAR-T therapy  · Unfortunately, he progressed through this as well  · Biopsy performed at [facility] documents diffuse large B cell lymphoma double hit.  · Completed radiation therapy at [facility]  · PET scan with progressive disease within the chest  · Initiation of bendamustine, Rituxan,Polivy 10/14/2021  · ?  Additional CAR-T therapy following cycle 1.  The patient is scheduled for follow-up at [facility] [facility]  · Scans as outlined above with progressive disease noted, new pleural effusion, enlarging mesenteric mass, pleural lymphadenopathy, new hepatic lesion. Dr. Rodriges has reached out to Dr. Clark at [facility] to determine if there are other treatment options or we should proceed with palliative care  · The patient was hospitalized from 10/25/2021 through 10/26/2021 due to progressive disease as noted above.  During  hospitalization the patient had paracentesis and thoracentesis performed.  Options discussed with the patient including palliative care versus Gemzar/Navelbine plus dexamethasone.  The patient wanted time to think about his options and discuss with his wife.  · 11/1/2021, the patient is seen today for triage visit.  He reports struggling with nausea and vomiting despite alternating Zofran and Phenergan suppository, this will be further detailed below.    2.  Comorbidities including hypertension, history of hyperlipidemia, history of gout and hyperuricemia.    3.  Anemia secondary to lymphoma  · Hemoglobin declined at 7.7 Friday, 10/15/2021  · Further decline at 7.2 10/16/2021 patient was transfused 2 units of packed red blood cells  · Hemoglobin today 10.8.    4.  High risk for tumor lysis  · Currently continuing on allopurinol 300 mg daily    5.  Acute Shortness of breath  · Evaluation 10/18/2021 initially thought to be volume overload with aggressive hydration. Chest x-ray was performed 10/20/2021 with a small effusion noted though no acute abnormalities  · Significant worsening of shortness of breath particularly when laying flat prompting stat CT angiogram today, 10/25/2021 with large right pleural effusion noted, progressive adenopathy in the chest.  Patient received paracentesis and thoracentesis while inpatient.  · 11/1/2021 patient reports his shortness of breath has improved.  He is still experiencing shortness of breath, however only on exertion.    6. Abdominal distention  · Stat CT of the abdomen performed 10/25/2021 with progressive disease of the abdomen and complex ascites noted.    7. MANUELA:  · Creatinine increased to 1.6 small amount of fluid given in the infusion area 10/26/2021, 100 mL normal saline. It is unclear if this is related to the extensive complex ascites versus dehydration  · 11/1/2021 creatinine today elevated at 1.64, BUN 52.  The patient will receive 1 L IV normal saline today in  clinic.    8.  Severe nausea and vomiting.  · 11/1/2021, the patient is seen today for triage visit.  He reports since Thursday of last week, that he has not been able to eat or drink.  He will attempt to eat or drink, but vomits it up immediately.  He has been utilizing Zofran and Phenergan suppository, however unable to keep Zofran down for long.  His wife reports that he continues to have adequate urine output, having 600 cc of fluid out daily.  The patient will receive 1 L IV normal saline today in clinic and 8 mg IV Zofran.  Discussed with the patient that we will attempt to treat outpatient, as they are very resistant to hospitalization at this point.  Discussed with the patient and his wife that they will return tomorrow for MD visit and possible IV fluids, however the patient may eventually require admission for further management.    PLAN:   1. 1 L IV normal saline in clinic today.   2. 8 mg IV Zofran in clinic today.  3. Patient will return tomorrow for follow-up with Dr. Rodriges to discuss further plan of care.  Message sent to scheduling to have the patient added for possible IV fluids as well.  4. Dr. Rodriges will reach out to [facility] to determine if there is additional treatment options for his lymphoma versus palliative care     The patient was discussed with Dr. Regalado who saw the patient while hospitalized recently, in Dr. Rodriges' absence.    I spent 35 minutes caring for Jake on this date of service. This time includes time spent by me in the following activities: preparing for the visit, reviewing tests, obtaining and/or reviewing a separately obtained history, performing a medically appropriate examination and/or evaluation, counseling and educating the patient/family/caregiver, ordering medications, tests, or procedures, documenting information in the medical record and care coordination.     Malorie Sanchez, APRN  11/01/2021

## 2021-11-01 NOTE — NURSING NOTE
"Here for IVF and labs; to be evaluated per JM Espana. Reports that he has not eaten or drank anything since last Thursday that has \"stayed down\".  "

## 2021-11-01 NOTE — TELEPHONE ENCOUNTER
Spoke with patient's wife and informed her will send a urgent message to appointment desk to schedule patient with a NP and possible fluifds today 11/1/2021.  Patient's wife v/u

## 2021-11-01 NOTE — TELEPHONE ENCOUNTER
Caller: Aubrie Saunders    Relationship to patient: Emergency Contact    Best call back number: [phone]    Chief complaint: PATIENT'S WIFE CALLING TO GET PT IN FOR FLUIDS TODAY, HE WAS SICK ALL WEEKEND WITH NAUSEA AND VOMITING, HE HAS BEEN TAKING ZOFRAN AND HE WAS GIVEN [facility] SUPPOSITORIES ON FRI, BUT HAS NOT BEEN ABLE TO KEEP ANYTHING DOWN, WARM TRANSFERRED TO [facility], WHO SAID TO TAKE AN URGENT MESSAGE FOR THE CLINICAL POOL, PLEASE ADVISE?    Type of visit: NEEDS FLUIDS    Requested date: TODAY

## 2021-11-02 PROBLEM — Z51.5: Status: ACTIVE | Noted: 2021-01-01

## 2021-11-02 NOTE — TELEPHONE ENCOUNTER
[facility] of Appt[facility]Pickup[facility] (n[facility] [place]; [facility] (n[facility] [place];    Prescriber (Antelmo) contacted pharmacy to obtain the medication below.    NDC:  [address]-71  Drug name: Sancuso  Strength: 3.1mg/24hours  Total Quantity:  2 (Containers- 2 X Units per Containers- 1)  Lot#:  [id number]  Expiration: 02/2023    Prescriber or staff member who picked up medication Chasity

## 2021-11-02 NOTE — H&P (VIEW-ONLY)
Subjective 1.TERMINAL CARE :PROGRESSIVE LARGE CELL NON HODGKIN'S LYMPHOMA WITH STOMACH COMPROMISE AND PERSISTENT NAUSEA AND VOMITING, VOLUME CONTRACTION AND NEED FOR IV FLUIDS, PROGRESSIVE LARGE ABDOMINAL MASS IN LUQ , NO PAIN.ECOG 3 ON 11/2/21. REFERRAL FOR HOSPAR CARE          DURING THE VISIT WITH THE PATIENT TODAY , PATIENT HAD FACE MASK, MY MEDICAL ASSISTANT AND I  HAD PROPPER PROTECTIVE EQUIPMENT, AND I DID HAND HYGIENE WITH SOAP AND WATER BEFORE AND AFTER THE VISIT.           HISTORY OF PRESENT ILLNESS:  The patient is a 61 y.o. year old male  who is here for follow-up with the above-mentioned history.  I have seen this patient today in the office after he was admitted a few days ago to the hospital with profound prostration and inability to function and found to have right pleural effusion, large volume ascites and progressive mass in the abdomen, left upper quadrant that was compressing the stomach. He had volume contraction, increased BUN and creatinine, electrolyte imbalance and hypercalcemia of malignancy. All this happened after the patient has had Polivy chemotherapy as a form of salvage for the treatment of a progressive triple non-Hodgkin lymphoma. Unfortunately the patient handled the chemotherapy relatively well 2 weeks ago but subsequently he had progressive disease documented clinically and radiologically. I have had a conversation with his attending physician at [facility] today, Dr. Clark, please review below. His phone number is [phone].     Besides the above symptoms, the patient continues being prostrated in bed. He is barely able to get out of the bed anymore. He is not able to take a shower. He is not able to get dressed and he is not able to tolerate any food by mouth whatsoever. Everything that he drinks or tries to eat comes right back up. The patient has lost tremendous amount of weight. He is prostrated and he has had profuse perspiration. He has not had any  cough but he has shortness of breath upon minimal exertion. The fatigue is to the point that he is barely able to get around or rotate in bed on his own anymore. He has not had bowel activity in many days because he has not had any food in the gastrointestinal tract. Urine volume improved after receiving IV fluids yesterday. The patient has not had any difficulty with urination or hematuria. His abdomen continues getting larger and larger according to the wife but he is not experiencing any pain.        Past Medical History:   Diagnosis Date   • Allergic rhinitis    • Gout    • Grade 3 follicular lymphoma of lymph nodes of multiple regions (HCC) 2020   • H/O foreign travel 09/2020    Frederickearnest, Jessie   • Hyperlipidemia    • Hypertension         Past Surgical History:   Procedure Laterality Date   • COLONOSCOPY  2014    normal   • TONSILLECTOMY     • VENOUS ACCESS DEVICE (PORT) INSERTION N/A 11/16/2020    Procedure: mediport placement;  Surgeon: Caitlin Bethea Jr., MD;  Location: [facility];  Service: Vascular;  Laterality: N/A;        Current Outpatient Medications on File Prior to Visit   Medication Sig Dispense Refill   • ALPRAZolam (XANAX) 0.25 MG tablet Take 1 tablet by mouth 3 (Three) Times a Day As Needed for Anxiety or Sleep. 9 tablet 0   • ondansetron (ZOFRAN) 8 MG tablet      • pantoprazole (PROTONIX) 40 MG EC tablet TAKE 1 TABLET BY MOUTH EVERY DAY 30 tablet 1   • promethazine (PHENERGAN) 25 MG suppository Insert 1 suppository into the rectum Every 8 (Eight) Hours As Needed for Nausea or Vomiting. 10 suppository 0   • [DISCONTINUED] allopurinol (ZYLOPRIM) 300 MG tablet Take 300 mg by mouth Daily.     • [DISCONTINUED] allopurinol (ZYLOPRIM) 300 MG tablet Take 300 mg by mouth Daily.     • [DISCONTINUED] furosemide (LASIX) 20 MG tablet Take 1 tablet by mouth Daily. 10 tablet 0   • [DISCONTINUED] Loratadine (CLARITIN PO) Take  by mouth Daily.     • [DISCONTINUED] loratadine (CLARITIN) 10 MG tablet  Take 10 mg by mouth Daily.     • [DISCONTINUED] metoprolol tartrate (LOPRESSOR) 25 MG tablet Take 1 tablet by mouth 2 (Two) Times a Day. (Patient taking differently: Take 25 mg by mouth 2 (Two) Times a Day. Patient takes this at night) 60 tablet 3   • [DISCONTINUED] multivitamin with minerals (MULTIVITAMIN ADULT PO) Take 1 tablet by mouth Daily.     • [DISCONTINUED] olmesartan-hydrochlorothiazide (BENICAR HCT) 20-12.5 MG per tablet Take 1 tablet by mouth Daily.     • [DISCONTINUED] sulfamethoxazole-trimethoprim (BACTRIM DS,SEPTRA DS) 800-160 MG per tablet      • [DISCONTINUED] valACYclovir (VALTREX) 500 MG tablet Take 1 tablet by mouth 2 (Two) Times a Day. 60 tablet 3   • [DISCONTINUED] Vitamin D, Cholecalciferol, 25 MCG (1000 UT) capsule Take  by mouth Daily.       Current Facility-Administered Medications on File Prior to Visit   Medication Dose Route Frequency Provider Last Rate Last Admin   • [DISCONTINUED] heparin injection 500 Units  500 Units Intravenous PRN Severiano Rodriges MD   500 Units at 11/01/21 1335   • [DISCONTINUED] sodium chloride 0.9 % flush 10 mL  10 mL Intravenous PRN Severiano Rodriges MD   10 mL at 11/01/21 1334        ALLERGIES:  No Known Allergies     Social History     Socioeconomic History   • Marital status:      Spouse name: Aubrie   • Number of children: 2   • Years of education: College   Tobacco Use   • Smoking status: Never Smoker   • Smokeless tobacco: Never Used   Substance and Sexual Activity   • Alcohol use: Yes     Alcohol/week: 6.0 - 8.0 standard drinks     Types: 6 - 8 Cans of beer per week     Comment: occ/  No caffeine use   • Drug use: No   • Sexual activity: Defer        Family History   Problem Relation Age of Onset   • Rheum arthritis Brother    • Stroke Maternal Grandmother    • Cancer Paternal Grandmother           Objective     Vitals:    11/02/21 1222   BP: 108/71   Pulse: 108   Resp: 18   Temp: 98.2 °F (36.8 °C)   TempSrc: Temporal   SpO2: 95%   PainSc:   2      Current Status 11/1/2021   ECOG score 2       Physical Exam  I HAVE PERSONALLY REVIEWED THE HISTORY OF THE PRESENT ILLNESS, PAST MEDICAL HISTORY, FAMILY HISTORY, SOCIAL HISTORY, ALLERGIES, MEDICATIONS STATED ABOVE IN THE  NOTE FROM TODAY.        GENERAL:  Well-developed, POORLY nourished  Patient  in  acute distress.   SKIN:  Warm, dry ,NO rashes,NO purpura ,NO petechiae.COLD FEET NO MOTTLING  HEENT:  Pupils were equal and reactive to light and accomodation, conjunctivae noninjected, no pterygium, normal extraocular movements, normal visual acuity.   NECK:  Supple with good range of motion; no thyromegaly , no other masses, no JVD or bruits, no cervical adenopathies.No carotid artery pain, no carotid abnormal pulsation , NO arterial dance.  LYMPHATICS:  No cervical, NO supraclavicular, NO axillary,NO epitrochlear , NO inguinal adenopathy.  CARDIAC   normal rate and regular rhythm, without murmur,NO rubs NO S3 NO S4 right or left .  LUNGS: normal breath sounds LEFT, DECREASED BREATH SOUND RIGHT BASE, no wheezing, rhonchi, crackles or rubs.  VASCULAR VENOUS: no cyanosis, collateral circulation, varicosities, edema, palpable cords, pain, erythema.  ABDOMEN:  Soft, nontender with no hepatomegaly, no splenomegaly,LARGE 25 X 30 CM MASS IN LUQ NONE TENDER , POSITIVE ascites, no collateral circulation,LUQ distention,no Jackson sign.  EXTREMITIES  AND SPINE:  No clubbing, cyanosis 1+ BOTH LE edema, no deformities , no pain .No kyphosis, scoliosis, no other deformities, no pain in spine, no pain in ribs , no pain inpelvic bone.SEVERE SARCOPENIA  NEUROLOGICAL:  Patient was awake, alert, oriented to time, person and place.        RECENT LABS:  Hematology WBC   Date Value Ref Range Status   11/01/2021 5.71 3.40 - 10.80 10*3/mm3 Final     RBC   Date Value Ref Range Status   11/01/2021 3.65 (L) 4.14 - 5.80 10*6/mm3 Final     Hemoglobin   Date Value Ref Range Status   11/01/2021 10.8 (L) 13.0 - 17.7 g/dL Final     Hematocrit    Date Value Ref Range Status   11/01/2021 33.9 (L) 37.5 - 51.0 % Final     Platelets   Date Value Ref Range Status   11/01/2021 93 (L) 140 - 450 10*3/mm3 Final       CBC:    WBC   Date Value Ref Range Status   11/01/2021 5.71 3.40 - 10.80 10*3/mm3 Final     RBC   Date Value Ref Range Status   11/01/2021 3.65 (L) 4.14 - 5.80 10*6/mm3 Final     Hemoglobin   Date Value Ref Range Status   11/01/2021 10.8 (L) 13.0 - 17.7 g/dL Final     Hematocrit   Date Value Ref Range Status   11/01/2021 33.9 (L) 37.5 - 51.0 % Final     MCV   Date Value Ref Range Status   11/01/2021 92.9 79.0 - 97.0 fL Final     MCH   Date Value Ref Range Status   11/01/2021 29.6 26.6 - 33.0 pg Final     MCHC   Date Value Ref Range Status   11/01/2021 31.9 31.5 - 35.7 g/dL Final     RDW   Date Value Ref Range Status   11/01/2021 19.0 (H) 12.3 - 15.4 % Final     RDW-SD   Date Value Ref Range Status   11/01/2021 60.4 (H) 37.0 - 54.0 fl Final     MPV   Date Value Ref Range Status   11/01/2021 11.1 6.0 - 12.0 fL Final     Platelets   Date Value Ref Range Status   11/01/2021 93 (L) 140 - 450 10*3/mm3 Final     Neutrophil Rel %   Date Value Ref Range Status   08/27/2021 96.5 % Final     Comment:     Per CAP, reference ranges should not be reported for percent cell counts  when reporting reference ranges for absolute number counts to prevent   misinterpretation of WBC differential data.     Neutrophil %   Date Value Ref Range Status   11/01/2021 90.0 (H) 42.7 - 76.0 % Final     Lymphocyte %   Date Value Ref Range Status   11/01/2021 0.9 (L) 19.6 - 45.3 % Final     Monocyte %   Date Value Ref Range Status   11/01/2021 7.2 5.0 - 12.0 % Final     Eosinophil %   Date Value Ref Range Status   11/01/2021 0.5 0.3 - 6.2 % Final     Basophil %   Date Value Ref Range Status   11/01/2021 0.2 0.0 - 1.5 % Final     Immature Grans %   Date Value Ref Range Status   11/01/2021 1.2 (H) 0.0 - 0.5 % Final     Neutrophils Absolute   Date Value Ref Range Status   08/27/2021 8.02  1.60 - 8.10 x10(3)/mcL Final     Neutrophils, Absolute   Date Value Ref Range Status   11/01/2021 5.14 1.70 - 7.00 10*3/mm3 Final     Lymphocytes Absolute   Date Value Ref Range Status   08/25/2021 0.01 (L) 1.10 - 3.50 x10(3)/mcL Final     Lymphocytes, Absolute   Date Value Ref Range Status   11/01/2021 0.05 (L) 0.70 - 3.10 10*3/mm3 Final     Monocytes Absolute   Date Value Ref Range Status   08/27/2021 0.29 (L) 0.30 - 1.10 x10(3)/mcL Final     Monocytes, Absolute   Date Value Ref Range Status   11/01/2021 0.41 0.10 - 0.90 10*3/mm3 Final     Eosinophils Absolute   Date Value Ref Range Status   08/26/2021 0.02 (L) 0.03 - 0.51 x10(3)/mcL Final     Eosinophil Abs   Date Value Ref Range Status   08/26/2021 0.8 % Final     Comment:     Per CAP, reference ranges should not be reported for percent cell counts  when reporting reference ranges for absolute number counts to prevent   misinterpretation of WBC differential data.     Eosinophils, Absolute   Date Value Ref Range Status   11/01/2021 0.03 0.00 - 0.40 10*3/mm3 Final     Basophils Absolute   Date Value Ref Range Status   08/25/2021 0.01 0.01 - 0.08 x10(3)/mcL Final     Basophils, Absolute   Date Value Ref Range Status   11/01/2021 0.01 0.00 - 0.20 10*3/mm3 Final     Immature Grans, Absolute   Date Value Ref Range Status   11/01/2021 0.07 (H) 0.00 - 0.05 10*3/mm3 Final     nRBC   Date Value Ref Range Status   11/01/2021 0.0 0.0 - 0.2 /100 WBC Final        CMP:    Glucose   Date Value Ref Range Status   11/01/2021 92 65 - 99 mg/dL Final     BUN   Date Value Ref Range Status   11/01/2021 52 (H) 8 - 23 mg/dL Final     Creatinine   Date Value Ref Range Status   11/01/2021 1.64 (H) 0.76 - 1.27 mg/dL Final   12/23/2020 1.30 0.60 - 1.30 mg/dL Final     Comment:     Serial Number: [id number]:  [id number]     Sodium   Date Value Ref Range Status   11/01/2021 139 136 - 145 mmol/L Final     Potassium   Date Value Ref Range Status   11/01/2021 4.4 3.5 - 5.2 mmol/L Final      Chloride   Date Value Ref Range Status   11/01/2021 92 (L) 98 - 107 mmol/L Final     CO2   Date Value Ref Range Status   11/01/2021 24.4 22.0 - 29.0 mmol/L Final     Calcium   Date Value Ref Range Status   11/01/2021 9.1 8.6 - 10.5 mg/dL Final     Total Protein   Date Value Ref Range Status   11/01/2021 6.7 6.0 - 8.5 g/dL Final     Albumin   Date Value Ref Range Status   11/01/2021 3.60 3.50 - 5.20 g/dL Final     ALT (SGPT)   Date Value Ref Range Status   11/01/2021 22 1 - 41 U/L Final     AST (SGOT)   Date Value Ref Range Status   11/01/2021 43 (H) 1 - 40 U/L Final     Alkaline Phosphatase   Date Value Ref Range Status   11/01/2021 88 39 - 117 U/L Final     Total Bilirubin   Date Value Ref Range Status   11/01/2021 0.3 0.0 - 1.2 mg/dL Final     eGFR  Am   Date Value Ref Range Status   02/24/2020 75 >60 mL/min/1.73 Final     Globulin   Date Value Ref Range Status   11/01/2021 3.1 gm/dL Final     A/G Ratio   Date Value Ref Range Status   11/01/2021 1.2 g/dL Final     BUN/Creatinine Ratio   Date Value Ref Range Status   11/01/2021 31.7 (H) 7.0 - 25.0 Final     Anion Gap   Date Value Ref Range Status   11/01/2021 22.6 (H) 5.0 - 15.0 mmol/L Final           Recent imaging:    CT Abdomen Pelvis With Contrast, CT Angiogram Chest With & Without Contrast    Result Date: 10/25/2021  Narrative: CT ANGIOGRAM OF THE CHEST. MULTIPLE CORONAL, SAGITTAL, AND 3-D RECONSTRUCTIONS. CT ABDOMEN AND PELVIS WITH IV CONTRAST  HISTORY: 61-year-old male with shortness of breath and tachycardia. Lymphoma.  TECHNIQUE: Radiation dose reduction techniques were utilized, including automated exposure control and exposure modulation based on body size. CT angiogram of the chest was performed. Multiple coronal, sagittal, and 3-D reconstruction images were obtained. Subsequently, 3 mm images were obtained through the abdomen and pelvis after the administration of IV contrast. Compared with PET/CT 04/26/2021 and contrast-enhanced CTs from  12/24/2020.  FINDINGS: CHEST CTA: There is suboptimal opacification of the pulmonary arteries and there is also breathing artifact. There is no evidence for pulmonary thromboemboli within the main pulmonary arteries or at the left pulmonary artery branches. No definite pulmonary thromboemboli are seen at the right pulmonary artery branches. There is a new large right pleural effusion layering to the right apex and there is extensive compressive atelectasis at the right lower and upper lobes. Elevation of the left hemidiaphragm is stable. There is new infiltrative lymphadenopathy along both internal mammary chains, significantly more prominent on the right, and there is new mediastinal lymphadenopathy. There is also new lymphadenopathy at both epicardial fat pads and there is a new thickened appearance of the diaphragm bilaterally. Right MediPort catheter tip is within the SVC.  ABDOMEN/PELVIS: There has been significant increase in size of the left mesenteric mass measuring approximately 12.5 x 11.0 cm, previously approximately 7.8 x 6.2 cm. The mass is significantly more heterogeneous and there is a new moderate volume of complex free fluid throughout the abdomen and pelvis. There is also new peritoneal and mesenteric thickening diffusely. The free fluid within the pelvis has internal density measurements of less than 10 Hounsfield units. In addition, there are 2 new low-attenuation liver lesions measuring 3.5 x 2.8 cm at the anterior hepatic segment and 3.5 x 3.0 cm at the lateral hepatic segment. The portal vein, splenic vein, and SMV are patent. The gallbladder is blurred by breathing artifact, but appears grossly unremarkable. The pancreas, adrenals, and spleen appear unremarkable. There is a new infiltrative mass involving the mid portion and lower pole of the left kidney, image 49 and there is a slightly heterogeneous appearance at the upper pole of the right kidney as well. The jejunum appears markedly  thickened. The ileum is collapsed. There is a mild colonic ileus, but there is no bowel obstruction.      Impression: 1. Dramatic change. Marked increase in size of the approximately 12.5 x 11.0 cm left mesenteric mass which has also become significantly more heterogeneous. There is a new moderate volume of complex ascites, new mesenteric and peritoneal thickening, marked jejunal thickening, lymphadenopathy, 2 liver lesions, prominent ill-defined left renal lesions, and likely right renal involvement as well. There has likely been extensive progression of lymphoma. Given the new heterogeneity within the dominant mesenteric mass and the significant thickening of the jejunum, some degree of hemorrhage within the mass cannot be entirely excluded, but the complex ascites consistently has density measurements of non hemorrhagic fluid. Surveillance of the hematocrit, however, is recommended. 2. New large right pleural effusion with extensive compressive atelectasis. New lymphadenopathy within the chest. 3. There is no convincing evidence for pulmonary thromboemboli.  Discussed with NASREEN Xavier.  This report was finalized on 10/25/2021 12:19 PM by Dr. Galilea Batista M.D.      US Paracentesis    Result Date: 10/29/2021  Narrative: ULTRASOUND-GUIDED PARACENTESIS  HISTORY: Ascites  After signed informed consent was obtained, the left lower quadrant was prepped and draped in the usual sterile fashion. Lidocaine was used for local anesthesia.  A 5 Senegalese catheter was inserted into the left lower quadrant using ultrasound guidance. 1750 mL of dark yellow  ascites was removed.  Confirmatory images were obtained.  Patient tolerated the procedure well with no complications.      Impression: Ultrasound-guided paracentesis as described.  This report was finalized on 10/29/2021 9:12 AM by Dr. Ricardo Almaguer M.D.      US Paracentesis    Result Date: 10/26/2021  Narrative: ULTRASOUND-GUIDED PARACENTESIS  HISTORY: Ascites   After signed informed consent was obtained, the left lower quadrant was prepped and draped in the usual sterile fashion. Lidocaine was used for local anesthesia.  A 5 Polish catheter was inserted into the left lower quadrant using ultrasound guidance. 2000 mL of dark yellow ascites was removed. Sample was sent to the lab.  Confirmatory images were obtained.  Patient tolerated the procedure well with no complications.      Impression: Ultrasound-guided paracentesis as described.  This report was finalized on 10/26/2021 10:46 AM by Dr. Ricardo Almaguer M.D.      US Thoracentesis    Result Date: 10/29/2021  Narrative: ULTRASOUND-GUIDED RIGHT THORACENTESIS  HISTORY: Pleural effusion  The risks, benefits and alternatives of the procedure were discussed with the patient and informed consent was obtained. Prior to the procedure ultrasound of the right chest was performed to evaluate the pleural effusion. However, only minimal pleural fluid was present, not amenable for thoracentesis.       Impression: Only minimal pleural fluid present, not amenable for thoracentesis  This report was finalized on 10/29/2021 9:12 AM by Dr. Ricardo Almaguer M.D.      US Thoracentesis    Result Date: 10/26/2021  Narrative: ULTRASOUND-GUIDED RIGHT THORACENTESIS.  HISTORY: Pleural effusion.  After signed informed consent was obtained the patient was prepped and draped in the usual sterile fashion. Lidocaine was used for local anesthesia.  Ultrasound guidance was used to place a 5 Polish Yueh catheter into the right pleural fluid collection. 1500 mL of dark domi-colored fluid was removed. Sample was sent to the lab.  Confirmatory images were obtained.  Patient tolerated the procedure well with no complications.      Impression: Ultrasound-guided right thoracentesis as described.   This report was finalized on 10/26/2021 10:45 AM by Dr. Ricardo Almaguer M.D.      XR Chest PA & Lateral    Result Date: 10/20/2021  Narrative: XR CHEST PA AND LATERAL-   HISTORY: Male who is 61 years-old,  short of breath  TECHNIQUE: Frontal and lateral views of the chest  COMPARISON:  image from CT from 12/23/2020  FINDINGS: Right chest port extends to the proximal right atrium. Heart, mediastinum and pulmonary vasculature are unremarkable. No focal pulmonary consolidation. Minimal right pleural effusion. No pneumothorax. Eventration/elevation of the left hemidiaphragm is again seen. No acute osseous process.      Impression: Minimal right pleural effusion.  This report was finalized on 10/20/2021 2:40 PM by Dr. [place] M.D.      CT Outside Films    Result Date: 10/27/2021  Narrative: This procedure was auto-finalized with no dictation required.   Non-gynecologic Cytology: [id number]  Order: [id number]   Status: Final result     Visible to patient: No (scheduled for 11/3/2021  2:00 AM)     Next appt: 11/11/2021 at 07:30 AM in Oncology ([facility])    Specimen Information: A: Abdominal Cavity; Body Fluid    [id number] 2: Pleural Cavity; Body Fluid    [id number] 3: Pleural Cavity; Body Fluid         0 Result Notes    Component    Case Report   Non-gynecologic Cytology                          Case: [id number]                                   Authorizing Provider:  Lui Farias MD     Collected:           10/26/2021 10:05 AM           Ordering Location:     [facility]  Received:            10/26/2021 01:45 PM                                  [facility]                                                                        Pathologist:           Fabricio Singh MD                                                          Specimens:   1) - Abdominal Cavity, Ascites                                                                       2) - Pleural Cavity                                                                                  3) - Pleural Cavity                                                                        Final Diagnosis    1.  Abdominal Cavity, Ascites:   A. Population of relatively monomorphic atypical lymphocytes with necrosis consistent with       involvement by patient's known high grade B cell lymphoma..  B. Separate fresh specimen (RPMI) sent to [facility] for flow cytometric analysis. (see [id number])     2.  Pleural Cavity, Thoracentesis:  A. Population of relatively monomorphic atypical lymphocytes with necrosis consistent with        involvement by patient's known high grade B cell lymphoma.  B. Separate fresh specimen (RPMI) sent to [facility] for flow cytometric analysis. (see [id number])     jat/jse    Electronically signed by Fabricio Singh MD on 11/2/2021 at 1200   Preliminary result electronically signed by Fabricio Singh MD on 10/28/2021 at 1451   Comment    Immunostains for Ki-67, AE1/AE3, Calretinin, CD20, CD5, CD79A and BCL-2 are performed on parts 1 and 2 utilizing appropriate controls.  The atypical monomorphic population demonstrates immunoreactivity for CD79a and BCL2 with diffuse weak immunoreactivity for CD20.  Ki67 is estimated at >97%.  This proliferation is negative for CD5, Calretinin and AE-1/AE-3. A few scattered reactive mesothelial cells are identified in both specimens.  The overall findings appear consistent with involvement of both sites by the patient's known high grade B cell lymphoma.  The findings must be correlated with results of flow cytometric analysis ([facility]) (which are currently pending).  Representative sections were shared in internal consultation with Nixon Champagne and , who concurred with the above diagnosis.                                  Non-gynecologic Cytology [[id number]] (Order [id number])  Order  Date: 10/26/2021 Department: [address] Released By: Morteza Gregory (auto-released) Authorizing: Lui Farias MD       Reprint Order Requisition    Non-gynecologic Cytology (Order #[id number]) on 10/26/21           Non-gynecologic Cytology:  [id number]  Order: [id number]   Status: Final result       Visible to patient: No (scheduled for 11/3/2021  2:00 AM)       Next appt: 11/11/2021 at 07:30 AM in Oncology ([facility])      Specimen Information: A: Abdominal Cavity; Body Fluid    [id number] 2: Pleural Cavity; Body Fluid    [id number] 3: Pleural Cavity; Body Fluid         0 Result Notes      Component    Case Report   Non-gynecologic Cytology                          Case: [id number]                                   Authorizing Provider:  Lui Farias MD     Collected:           10/26/2021 10:05 AM           Ordering Location:     [facility]  Received:            10/26/2021 01:45 PM                                  [address] [facility]                                                                        Pathologist:           Fabricio Singh MD                                                          Specimens:   1) - Abdominal Cavity, Ascites                                                                       2) - Pleural Cavity                                                                                  3) - Pleural Cavity                                                                        Final Diagnosis   1.  Abdominal Cavity, Ascites:   A. Population of relatively monomorphic atypical lymphocytes with necrosis consistent with       involvement by patient's known high grade B cell lymphoma..  B. Separate fresh specimen (RPMI) sent to [facility] for flow cytometric analysis. (see [id number])     2.  Pleural Cavity, Thoracentesis:  A. Population of relatively monomorphic atypical lymphocytes with necrosis consistent with        involvement by patient's known high grade B cell lymphoma.  B. Separate fresh specimen (RPMI) sent to [facility] for flow cytometric analysis. (see [id number])     christie/jslydia    Electronically signed by Fabricio Singh MD on 11/2/2021 at 1200   Preliminary result electronically signed by Fabricio Singh  MD LUIS MANUEL on 10/28/2021 at 1451   Comment    Immunostains for Ki-67, AE1/AE3, Calretinin, CD20, CD5, CD79A and BCL-2 are performed on parts 1 and 2 utilizing appropriate controls.  The atypical monomorphic population demonstrates immunoreactivity for CD79a and BCL2 with diffuse weak immunoreactivity for CD20.  Ki67 is estimated at >97%.  This proliferation is negative for CD5, Calretinin and AE-1/AE-3. A few scattered reactive mesothelial cells are identified in both specimens.  The overall findings appear consistent with involvement of both sites by the patient's known high grade B cell lymphoma.  The findings must be correlated with results of flow cytometric analysis ([facility]) (which are currently pending).  Representative sections were shared in internal consultation with Nixon Champagne and , who concurred with the above diagnosis.        genit/melly/higinio    Gross Description    1.  Received 1150 ml of tan fluid.  Thin Prep (1) and cell block.     2.  Received 950 ml of brown fluid.  Thin Prep (1) and cell block and sent to [facility] for flow cytometry.   Microscopic Description  1.   Abdominal Cavity, Ascites:  monomorphic atypical population of intermediate to large lymphocytes with clumped chromatin, notches and grooves and macronucleoli cannot rule out a proliferative lymph node.  Correlate result with pending flow cytometry.     2.   Pleural Cavity, Thoracentesis:  monomorphic atypical population of intermediate to large lymphocytes with clumped chromatin, notches and grooves and macronucleoli cannot rule out a proliferative lymph node.  Correlate result with pending flow cytometry.       *Patient has known history of lymphoma.     Screened by:  [facility]         Resulting Agency [facility]                Specimen Collected: 10/26/21 10:05 Last Resulted: 11/02/21 12:00       Order Details       View Encounter       Lab and Collection Details       Routing       Result History               Related Result Highlights      Body Fluid Culture - Body Fluid, Peritoneum  Final result 10/26/2021             Lactate Dehydrogenase, Body Fluid - Pleural Fluid, Pleural Cavity  Final result 10/26/2021             Protein, Body Fluid - Pleural Fluid, Pleural Cavity  Final result 10/26/2021                 Scans on Order [id number]    Scan on 11/1/2021 1435 by Sadaf Fortune: FLOW CYTOMETRY AND FISH ANALYSIS REPORTS - [facility]        Document on 11/2/2021 1200 by Fabricio Singh MD           Result Care Coordination      Patient Communication    11/3/2021  2:00 AM Release Now  Not seen Back to Top                 Assessment/Plan     Diagnoses and all orders for this visit:    1. 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 Left upper quadrant abdominal mass    3. Encounter for terminal care    4. Dehydration, severe    5. Abnormal blood chemistry level    Other orders  -     lansoprazole (Prevacid SoluTab) 30 MG Tablet Delayed Release Dispersible disintegrating tablet; Take 1 tablet by mouth 2 (Two) Times a Day Before Meals.  Dispense: 60 tablet; Refill: 2  -     granisetron (Sancuso) 3.1 MG/24HR; Place 1 patch on the skin as directed by provider 1 (One) Time for 1 dose.  Dispense: 10 patch; Refill: 0     I have reviewed this patient today after his admission to the hospital a few days ago with profound fatigue, anorexia, inability to eat. Found to have large volume ascites. He underwent a paracentesis obtaining a total of 4000 mL of fluid. Pathology of this documented an abnormal population of lymphocytes and by flow cytometry these lymphocytes are CD10 positive, CD22 positive, CD38 positive and PAX5 and sKappa positive. The lymphocytes were CD20 and CD19 negative. FISH was positive for MYC rearrangement only.     I had a lengthy discussion with the patient and his wife today. In my opinion this patient is terminal at this time. The amount of tumor progression  in his abdominal examination today is more than dramatic if I compared his clinical examination with radiological assessment from a few days ago. The mass in his abdomen is almost 30 cm in diameter. It is bulging out very dramatically and is compressing the stomach to the point that the patient is not able to eat anymore or drink anymore. He is profoundly malnourished, as a consequence of this and he is profoundly volume depleted as a consequence of this.     I have placed a phone call and discussed with Dr. Clark, phone #[phone]. I pointed out to him that the patient’s ECOG performance status is 3. He looks terrible to me and he has dramatic deterioration in the last 2 weeks. All this in spite of radiation therapy to the mass at [place] and [place] chemotherapy by us 2 weeks ago. He was transfused in the hospital. The white count, hemoglobin and platelets are better. Laboratory chemistry yesterday disclosed still BUN and creatinine elevation. His LDH has been more than 1000. Putting all these issues into consideration, I do believe that this patient is terminal. I pointed out that to him and his wife today. I pointed out to him as well that I would be surprised if he is able to live more than 2 weeks from now. I suggested for him to be under Hospice care and we will place an order for him to be seen by Hospice at home. I would like to ask Hospice to give him IV fluids through the night, at least 1500 mL to keep him going for a few more days and be able to stay with the family and improve in some way quality of life.     His port can be utilized by Hospice nurses for this purpose.     I strongly believe also that the patient so far is not experiencing any pain and I have not prescribed any pain medicine for him. I advised his wife that maybe some Tylenol suppository could be amenable to control some of the symptoms in case that they happen. Given the lack of availability of the upper GI tract for  medications, I have provided for him some Sancuso patches for nausea control and we have provided as well Prevacid SoluTab tablet to dissolve in the mouth and use 1 tablet 30 mg b.i.d.     I have reviewed all the other medication that he was taking. There is no need for him to take any more of these other medications including allopurinol, furosemide, loratadine, metoprolol, multivitamin, olmesartan, Bactrim and acyclovir. I have discontinued all these medicines.     I discussed with the patient the fact that the tumor is dramatically enlarging in spite of any therapy. Dr. Clark thought that he could have enough performance status to undergo high-dose hyper-CVAD but I do not think the patient would survive that treatment anyway.     Under the present circumstances, he agrees and he was expecting to have this conversation today and he was appreciative of that. I discussed this with him and his wife. I asked them to have a video visit or phone visit with me on weekly basis. We will be glad to support him in the best way that we can and if by any chance the patient’s wife is not able to take care of him by the end, we will be glad to admit him to inpatient palliative care under Hospice umbrella.     I also discussed this with our nurses here in the office today to provide all the support that he needs and Hospice will be having a referral today.     Not planning to see him in the office anymore. There is no purpose for this anymore. They realize that the end is near.

## 2021-11-02 NOTE — PROGRESS NOTES
Subjective       PATIENT NAME:  Jake Saunders  YOB: 1960  PATIENT'S SEX:  male    PATIENT'S ADDRESS:  [address] [place] [address]  PROVIDER:      Encounter Diagnosis   Name Primary?   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    Pt will require IV fluids every night. Infuse one liter via mediport    Electronically Signed By: Severiano Rodriges MD  (NPI: [id number])

## 2021-11-02 NOTE — PROGRESS NOTES
Subjective 1.TERMINAL CARE :PROGRESSIVE LARGE CELL NON HODGKIN'S LYMPHOMA WITH STOMACH COMPROMISE AND PERSISTENT NAUSEA AND VOMITING, VOLUME CONTRACTION AND NEED FOR IV FLUIDS, PROGRESSIVE LARGE ABDOMINAL MASS IN LUQ , NO PAIN.ECOG 3 ON 11/2/21. REFERRAL FOR HOSPAR CARE          DURING THE VISIT WITH THE PATIENT TODAY , PATIENT HAD FACE MASK, MY MEDICAL ASSISTANT AND I  HAD PROPPER PROTECTIVE EQUIPMENT, AND I DID HAND HYGIENE WITH SOAP AND WATER BEFORE AND AFTER THE VISIT.           HISTORY OF PRESENT ILLNESS:  The patient is a 61 y.o. year old male  who is here for follow-up with the above-mentioned history.  I have seen this patient today in the office after he was admitted a few days ago to the hospital with profound prostration and inability to function and found to have right pleural effusion, large volume ascites and progressive mass in the abdomen, left upper quadrant that was compressing the stomach. He had volume contraction, increased BUN and creatinine, electrolyte imbalance and hypercalcemia of malignancy. All this happened after the patient has had Polivy chemotherapy as a form of salvage for the treatment of a progressive triple non-Hodgkin lymphoma. Unfortunately the patient handled the chemotherapy relatively well 2 weeks ago but subsequently he had progressive disease documented clinically and radiologically. I have had a conversation with his attending physician at [facility] today, Dr. Clark, please review below. His phone number is [phone].     Besides the above symptoms, the patient continues being prostrated in bed. He is barely able to get out of the bed anymore. He is not able to take a shower. He is not able to get dressed and he is not able to tolerate any food by mouth whatsoever. Everything that he drinks or tries to eat comes right back up. The patient has lost tremendous amount of weight. He is prostrated and he has had profuse perspiration. He has not had any  cough but he has shortness of breath upon minimal exertion. The fatigue is to the point that he is barely able to get around or rotate in bed on his own anymore. He has not had bowel activity in many days because he has not had any food in the gastrointestinal tract. Urine volume improved after receiving IV fluids yesterday. The patient has not had any difficulty with urination or hematuria. His abdomen continues getting larger and larger according to the wife but he is not experiencing any pain.        Past Medical History:   Diagnosis Date   • Allergic rhinitis    • Gout    • Grade 3 follicular lymphoma of lymph nodes of multiple regions (HCC) 2020   • H/O foreign travel 09/2020    Frederickearnest, Jessie   • Hyperlipidemia    • Hypertension         Past Surgical History:   Procedure Laterality Date   • COLONOSCOPY  2014    normal   • TONSILLECTOMY     • VENOUS ACCESS DEVICE (PORT) INSERTION N/A 11/16/2020    Procedure: mediport placement;  Surgeon: Caitlin Bethea Jr., MD;  Location: [facility];  Service: Vascular;  Laterality: N/A;        Current Outpatient Medications on File Prior to Visit   Medication Sig Dispense Refill   • ALPRAZolam (XANAX) 0.25 MG tablet Take 1 tablet by mouth 3 (Three) Times a Day As Needed for Anxiety or Sleep. 9 tablet 0   • ondansetron (ZOFRAN) 8 MG tablet      • pantoprazole (PROTONIX) 40 MG EC tablet TAKE 1 TABLET BY MOUTH EVERY DAY 30 tablet 1   • promethazine (PHENERGAN) 25 MG suppository Insert 1 suppository into the rectum Every 8 (Eight) Hours As Needed for Nausea or Vomiting. 10 suppository 0   • [DISCONTINUED] allopurinol (ZYLOPRIM) 300 MG tablet Take 300 mg by mouth Daily.     • [DISCONTINUED] allopurinol (ZYLOPRIM) 300 MG tablet Take 300 mg by mouth Daily.     • [DISCONTINUED] furosemide (LASIX) 20 MG tablet Take 1 tablet by mouth Daily. 10 tablet 0   • [DISCONTINUED] Loratadine (CLARITIN PO) Take  by mouth Daily.     • [DISCONTINUED] loratadine (CLARITIN) 10 MG tablet  Take 10 mg by mouth Daily.     • [DISCONTINUED] metoprolol tartrate (LOPRESSOR) 25 MG tablet Take 1 tablet by mouth 2 (Two) Times a Day. (Patient taking differently: Take 25 mg by mouth 2 (Two) Times a Day. Patient takes this at night) 60 tablet 3   • [DISCONTINUED] multivitamin with minerals (MULTIVITAMIN ADULT PO) Take 1 tablet by mouth Daily.     • [DISCONTINUED] olmesartan-hydrochlorothiazide (BENICAR HCT) 20-12.5 MG per tablet Take 1 tablet by mouth Daily.     • [DISCONTINUED] sulfamethoxazole-trimethoprim (BACTRIM DS,SEPTRA DS) 800-160 MG per tablet      • [DISCONTINUED] valACYclovir (VALTREX) 500 MG tablet Take 1 tablet by mouth 2 (Two) Times a Day. 60 tablet 3   • [DISCONTINUED] Vitamin D, Cholecalciferol, 25 MCG (1000 UT) capsule Take  by mouth Daily.       Current Facility-Administered Medications on File Prior to Visit   Medication Dose Route Frequency Provider Last Rate Last Admin   • [DISCONTINUED] heparin injection 500 Units  500 Units Intravenous PRN Severiano Rodriges MD   500 Units at 11/01/21 1335   • [DISCONTINUED] sodium chloride 0.9 % flush 10 mL  10 mL Intravenous PRN Severiano Rodriges MD   10 mL at 11/01/21 1334        ALLERGIES:  No Known Allergies     Social History     Socioeconomic History   • Marital status:      Spouse name: Aubrie   • Number of children: 2   • Years of education: College   Tobacco Use   • Smoking status: Never Smoker   • Smokeless tobacco: Never Used   Substance and Sexual Activity   • Alcohol use: Yes     Alcohol/week: 6.0 - 8.0 standard drinks     Types: 6 - 8 Cans of beer per week     Comment: occ/  No caffeine use   • Drug use: No   • Sexual activity: Defer        Family History   Problem Relation Age of Onset   • Rheum arthritis Brother    • Stroke Maternal Grandmother    • Cancer Paternal Grandmother           Objective     Vitals:    11/02/21 1222   BP: 108/71   Pulse: 108   Resp: 18   Temp: 98.2 °F (36.8 °C)   TempSrc: Temporal   SpO2: 95%   PainSc:   2      Current Status 11/1/2021   ECOG score 2       Physical Exam  I HAVE PERSONALLY REVIEWED THE HISTORY OF THE PRESENT ILLNESS, PAST MEDICAL HISTORY, FAMILY HISTORY, SOCIAL HISTORY, ALLERGIES, MEDICATIONS STATED ABOVE IN THE  NOTE FROM TODAY.        GENERAL:  Well-developed, POORLY nourished  Patient  in  acute distress.   SKIN:  Warm, dry ,NO rashes,NO purpura ,NO petechiae.COLD FEET NO MOTTLING  HEENT:  Pupils were equal and reactive to light and accomodation, conjunctivae noninjected, no pterygium, normal extraocular movements, normal visual acuity.   NECK:  Supple with good range of motion; no thyromegaly , no other masses, no JVD or bruits, no cervical adenopathies.No carotid artery pain, no carotid abnormal pulsation , NO arterial dance.  LYMPHATICS:  No cervical, NO supraclavicular, NO axillary,NO epitrochlear , NO inguinal adenopathy.  CARDIAC   normal rate and regular rhythm, without murmur,NO rubs NO S3 NO S4 right or left .  LUNGS: normal breath sounds LEFT, DECREASED BREATH SOUND RIGHT BASE, no wheezing, rhonchi, crackles or rubs.  VASCULAR VENOUS: no cyanosis, collateral circulation, varicosities, edema, palpable cords, pain, erythema.  ABDOMEN:  Soft, nontender with no hepatomegaly, no splenomegaly,LARGE 25 X 30 CM MASS IN LUQ NONE TENDER , POSITIVE ascites, no collateral circulation,LUQ distention,no Jackson sign.  EXTREMITIES  AND SPINE:  No clubbing, cyanosis 1+ BOTH LE edema, no deformities , no pain .No kyphosis, scoliosis, no other deformities, no pain in spine, no pain in ribs , no pain inpelvic bone.SEVERE SARCOPENIA  NEUROLOGICAL:  Patient was awake, alert, oriented to time, person and place.        RECENT LABS:  Hematology WBC   Date Value Ref Range Status   11/01/2021 5.71 3.40 - 10.80 10*3/mm3 Final     RBC   Date Value Ref Range Status   11/01/2021 3.65 (L) 4.14 - 5.80 10*6/mm3 Final     Hemoglobin   Date Value Ref Range Status   11/01/2021 10.8 (L) 13.0 - 17.7 g/dL Final     Hematocrit    Date Value Ref Range Status   11/01/2021 33.9 (L) 37.5 - 51.0 % Final     Platelets   Date Value Ref Range Status   11/01/2021 93 (L) 140 - 450 10*3/mm3 Final       CBC:    WBC   Date Value Ref Range Status   11/01/2021 5.71 3.40 - 10.80 10*3/mm3 Final     RBC   Date Value Ref Range Status   11/01/2021 3.65 (L) 4.14 - 5.80 10*6/mm3 Final     Hemoglobin   Date Value Ref Range Status   11/01/2021 10.8 (L) 13.0 - 17.7 g/dL Final     Hematocrit   Date Value Ref Range Status   11/01/2021 33.9 (L) 37.5 - 51.0 % Final     MCV   Date Value Ref Range Status   11/01/2021 92.9 79.0 - 97.0 fL Final     MCH   Date Value Ref Range Status   11/01/2021 29.6 26.6 - 33.0 pg Final     MCHC   Date Value Ref Range Status   11/01/2021 31.9 31.5 - 35.7 g/dL Final     RDW   Date Value Ref Range Status   11/01/2021 19.0 (H) 12.3 - 15.4 % Final     RDW-SD   Date Value Ref Range Status   11/01/2021 60.4 (H) 37.0 - 54.0 fl Final     MPV   Date Value Ref Range Status   11/01/2021 11.1 6.0 - 12.0 fL Final     Platelets   Date Value Ref Range Status   11/01/2021 93 (L) 140 - 450 10*3/mm3 Final     Neutrophil Rel %   Date Value Ref Range Status   08/27/2021 96.5 % Final     Comment:     Per CAP, reference ranges should not be reported for percent cell counts  when reporting reference ranges for absolute number counts to prevent   misinterpretation of WBC differential data.     Neutrophil %   Date Value Ref Range Status   11/01/2021 90.0 (H) 42.7 - 76.0 % Final     Lymphocyte %   Date Value Ref Range Status   11/01/2021 0.9 (L) 19.6 - 45.3 % Final     Monocyte %   Date Value Ref Range Status   11/01/2021 7.2 5.0 - 12.0 % Final     Eosinophil %   Date Value Ref Range Status   11/01/2021 0.5 0.3 - 6.2 % Final     Basophil %   Date Value Ref Range Status   11/01/2021 0.2 0.0 - 1.5 % Final     Immature Grans %   Date Value Ref Range Status   11/01/2021 1.2 (H) 0.0 - 0.5 % Final     Neutrophils Absolute   Date Value Ref Range Status   08/27/2021 8.02  1.60 - 8.10 x10(3)/mcL Final     Neutrophils, Absolute   Date Value Ref Range Status   11/01/2021 5.14 1.70 - 7.00 10*3/mm3 Final     Lymphocytes Absolute   Date Value Ref Range Status   08/25/2021 0.01 (L) 1.10 - 3.50 x10(3)/mcL Final     Lymphocytes, Absolute   Date Value Ref Range Status   11/01/2021 0.05 (L) 0.70 - 3.10 10*3/mm3 Final     Monocytes Absolute   Date Value Ref Range Status   08/27/2021 0.29 (L) 0.30 - 1.10 x10(3)/mcL Final     Monocytes, Absolute   Date Value Ref Range Status   11/01/2021 0.41 0.10 - 0.90 10*3/mm3 Final     Eosinophils Absolute   Date Value Ref Range Status   08/26/2021 0.02 (L) 0.03 - 0.51 x10(3)/mcL Final     Eosinophil Abs   Date Value Ref Range Status   08/26/2021 0.8 % Final     Comment:     Per CAP, reference ranges should not be reported for percent cell counts  when reporting reference ranges for absolute number counts to prevent   misinterpretation of WBC differential data.     Eosinophils, Absolute   Date Value Ref Range Status   11/01/2021 0.03 0.00 - 0.40 10*3/mm3 Final     Basophils Absolute   Date Value Ref Range Status   08/25/2021 0.01 0.01 - 0.08 x10(3)/mcL Final     Basophils, Absolute   Date Value Ref Range Status   11/01/2021 0.01 0.00 - 0.20 10*3/mm3 Final     Immature Grans, Absolute   Date Value Ref Range Status   11/01/2021 0.07 (H) 0.00 - 0.05 10*3/mm3 Final     nRBC   Date Value Ref Range Status   11/01/2021 0.0 0.0 - 0.2 /100 WBC Final        CMP:    Glucose   Date Value Ref Range Status   11/01/2021 92 65 - 99 mg/dL Final     BUN   Date Value Ref Range Status   11/01/2021 52 (H) 8 - 23 mg/dL Final     Creatinine   Date Value Ref Range Status   11/01/2021 1.64 (H) 0.76 - 1.27 mg/dL Final   12/23/2020 1.30 0.60 - 1.30 mg/dL Final     Comment:     Serial Number: [id number]:  [id number]     Sodium   Date Value Ref Range Status   11/01/2021 139 136 - 145 mmol/L Final     Potassium   Date Value Ref Range Status   11/01/2021 4.4 3.5 - 5.2 mmol/L Final      Chloride   Date Value Ref Range Status   11/01/2021 92 (L) 98 - 107 mmol/L Final     CO2   Date Value Ref Range Status   11/01/2021 24.4 22.0 - 29.0 mmol/L Final     Calcium   Date Value Ref Range Status   11/01/2021 9.1 8.6 - 10.5 mg/dL Final     Total Protein   Date Value Ref Range Status   11/01/2021 6.7 6.0 - 8.5 g/dL Final     Albumin   Date Value Ref Range Status   11/01/2021 3.60 3.50 - 5.20 g/dL Final     ALT (SGPT)   Date Value Ref Range Status   11/01/2021 22 1 - 41 U/L Final     AST (SGOT)   Date Value Ref Range Status   11/01/2021 43 (H) 1 - 40 U/L Final     Alkaline Phosphatase   Date Value Ref Range Status   11/01/2021 88 39 - 117 U/L Final     Total Bilirubin   Date Value Ref Range Status   11/01/2021 0.3 0.0 - 1.2 mg/dL Final     eGFR  Am   Date Value Ref Range Status   02/24/2020 75 >60 mL/min/1.73 Final     Globulin   Date Value Ref Range Status   11/01/2021 3.1 gm/dL Final     A/G Ratio   Date Value Ref Range Status   11/01/2021 1.2 g/dL Final     BUN/Creatinine Ratio   Date Value Ref Range Status   11/01/2021 31.7 (H) 7.0 - 25.0 Final     Anion Gap   Date Value Ref Range Status   11/01/2021 22.6 (H) 5.0 - 15.0 mmol/L Final           Recent imaging:    CT Abdomen Pelvis With Contrast, CT Angiogram Chest With & Without Contrast    Result Date: 10/25/2021  Narrative: CT ANGIOGRAM OF THE CHEST. MULTIPLE CORONAL, SAGITTAL, AND 3-D RECONSTRUCTIONS. CT ABDOMEN AND PELVIS WITH IV CONTRAST  HISTORY: 61-year-old male with shortness of breath and tachycardia. Lymphoma.  TECHNIQUE: Radiation dose reduction techniques were utilized, including automated exposure control and exposure modulation based on body size. CT angiogram of the chest was performed. Multiple coronal, sagittal, and 3-D reconstruction images were obtained. Subsequently, 3 mm images were obtained through the abdomen and pelvis after the administration of IV contrast. Compared with PET/CT 04/26/2021 and contrast-enhanced CTs from  12/24/2020.  FINDINGS: CHEST CTA: There is suboptimal opacification of the pulmonary arteries and there is also breathing artifact. There is no evidence for pulmonary thromboemboli within the main pulmonary arteries or at the left pulmonary artery branches. No definite pulmonary thromboemboli are seen at the right pulmonary artery branches. There is a new large right pleural effusion layering to the right apex and there is extensive compressive atelectasis at the right lower and upper lobes. Elevation of the left hemidiaphragm is stable. There is new infiltrative lymphadenopathy along both internal mammary chains, significantly more prominent on the right, and there is new mediastinal lymphadenopathy. There is also new lymphadenopathy at both epicardial fat pads and there is a new thickened appearance of the diaphragm bilaterally. Right MediPort catheter tip is within the SVC.  ABDOMEN/PELVIS: There has been significant increase in size of the left mesenteric mass measuring approximately 12.5 x 11.0 cm, previously approximately 7.8 x 6.2 cm. The mass is significantly more heterogeneous and there is a new moderate volume of complex free fluid throughout the abdomen and pelvis. There is also new peritoneal and mesenteric thickening diffusely. The free fluid within the pelvis has internal density measurements of less than 10 Hounsfield units. In addition, there are 2 new low-attenuation liver lesions measuring 3.5 x 2.8 cm at the anterior hepatic segment and 3.5 x 3.0 cm at the lateral hepatic segment. The portal vein, splenic vein, and SMV are patent. The gallbladder is blurred by breathing artifact, but appears grossly unremarkable. The pancreas, adrenals, and spleen appear unremarkable. There is a new infiltrative mass involving the mid portion and lower pole of the left kidney, image 49 and there is a slightly heterogeneous appearance at the upper pole of the right kidney as well. The jejunum appears markedly  thickened. The ileum is collapsed. There is a mild colonic ileus, but there is no bowel obstruction.      Impression: 1. Dramatic change. Marked increase in size of the approximately 12.5 x 11.0 cm left mesenteric mass which has also become significantly more heterogeneous. There is a new moderate volume of complex ascites, new mesenteric and peritoneal thickening, marked jejunal thickening, lymphadenopathy, 2 liver lesions, prominent ill-defined left renal lesions, and likely right renal involvement as well. There has likely been extensive progression of lymphoma. Given the new heterogeneity within the dominant mesenteric mass and the significant thickening of the jejunum, some degree of hemorrhage within the mass cannot be entirely excluded, but the complex ascites consistently has density measurements of non hemorrhagic fluid. Surveillance of the hematocrit, however, is recommended. 2. New large right pleural effusion with extensive compressive atelectasis. New lymphadenopathy within the chest. 3. There is no convincing evidence for pulmonary thromboemboli.  Discussed with NASREEN Xavier.  This report was finalized on 10/25/2021 12:19 PM by Dr. Galilea Batista M.D.      US Paracentesis    Result Date: 10/29/2021  Narrative: ULTRASOUND-GUIDED PARACENTESIS  HISTORY: Ascites  After signed informed consent was obtained, the left lower quadrant was prepped and draped in the usual sterile fashion. Lidocaine was used for local anesthesia.  A 5 Georgian catheter was inserted into the left lower quadrant using ultrasound guidance. 1750 mL of dark yellow  ascites was removed.  Confirmatory images were obtained.  Patient tolerated the procedure well with no complications.      Impression: Ultrasound-guided paracentesis as described.  This report was finalized on 10/29/2021 9:12 AM by Dr. Ricardo Almaguer M.D.      US Paracentesis    Result Date: 10/26/2021  Narrative: ULTRASOUND-GUIDED PARACENTESIS  HISTORY: Ascites   After signed informed consent was obtained, the left lower quadrant was prepped and draped in the usual sterile fashion. Lidocaine was used for local anesthesia.  A 5 Canadian catheter was inserted into the left lower quadrant using ultrasound guidance. 2000 mL of dark yellow ascites was removed. Sample was sent to the lab.  Confirmatory images were obtained.  Patient tolerated the procedure well with no complications.      Impression: Ultrasound-guided paracentesis as described.  This report was finalized on 10/26/2021 10:46 AM by Dr. Ricardo Almaguer M.D.      US Thoracentesis    Result Date: 10/29/2021  Narrative: ULTRASOUND-GUIDED RIGHT THORACENTESIS  HISTORY: Pleural effusion  The risks, benefits and alternatives of the procedure were discussed with the patient and informed consent was obtained. Prior to the procedure ultrasound of the right chest was performed to evaluate the pleural effusion. However, only minimal pleural fluid was present, not amenable for thoracentesis.       Impression: Only minimal pleural fluid present, not amenable for thoracentesis  This report was finalized on 10/29/2021 9:12 AM by Dr. Ricardo Almaguer M.D.      US Thoracentesis    Result Date: 10/26/2021  Narrative: ULTRASOUND-GUIDED RIGHT THORACENTESIS.  HISTORY: Pleural effusion.  After signed informed consent was obtained the patient was prepped and draped in the usual sterile fashion. Lidocaine was used for local anesthesia.  Ultrasound guidance was used to place a 5 Canadian Yueh catheter into the right pleural fluid collection. 1500 mL of dark domi-colored fluid was removed. Sample was sent to the lab.  Confirmatory images were obtained.  Patient tolerated the procedure well with no complications.      Impression: Ultrasound-guided right thoracentesis as described.   This report was finalized on 10/26/2021 10:45 AM by Dr. Ricardo Almaguer M.D.      XR Chest PA & Lateral    Result Date: 10/20/2021  Narrative: XR CHEST PA AND LATERAL-   HISTORY: Male who is 61 years-old,  short of breath  TECHNIQUE: Frontal and lateral views of the chest  COMPARISON:  image from CT from 12/23/2020  FINDINGS: Right chest port extends to the proximal right atrium. Heart, mediastinum and pulmonary vasculature are unremarkable. No focal pulmonary consolidation. Minimal right pleural effusion. No pneumothorax. Eventration/elevation of the left hemidiaphragm is again seen. No acute osseous process.      Impression: Minimal right pleural effusion.  This report was finalized on 10/20/2021 2:40 PM by Dr. [place] M.D.      CT Outside Films    Result Date: 10/27/2021  Narrative: This procedure was auto-finalized with no dictation required.   Non-gynecologic Cytology: [id number]  Order: [id number]   Status: Final result     Visible to patient: No (scheduled for 11/3/2021  2:00 AM)     Next appt: 11/11/2021 at 07:30 AM in Oncology ([facility])    Specimen Information: A: Abdominal Cavity; Body Fluid    [id number] 2: Pleural Cavity; Body Fluid    [id number] 3: Pleural Cavity; Body Fluid         0 Result Notes    Component    Case Report   Non-gynecologic Cytology                          Case: [id number]                                   Authorizing Provider:  Lui Farias MD     Collected:           10/26/2021 10:05 AM           Ordering Location:     [facility]  Received:            10/26/2021 01:45 PM                                  [facility]                                                                        Pathologist:           Fabricio Singh MD                                                          Specimens:   1) - Abdominal Cavity, Ascites                                                                       2) - Pleural Cavity                                                                                  3) - Pleural Cavity                                                                        Final Diagnosis    1.  Abdominal Cavity, Ascites:   A. Population of relatively monomorphic atypical lymphocytes with necrosis consistent with       involvement by patient's known high grade B cell lymphoma..  B. Separate fresh specimen (RPMI) sent to [facility] for flow cytometric analysis. (see [id number])     2.  Pleural Cavity, Thoracentesis:  A. Population of relatively monomorphic atypical lymphocytes with necrosis consistent with        involvement by patient's known high grade B cell lymphoma.  B. Separate fresh specimen (RPMI) sent to [facility] for flow cytometric analysis. (see [id number])     jat/jse    Electronically signed by Fabricio Singh MD on 11/2/2021 at 1200   Preliminary result electronically signed by Fabricio Singh MD on 10/28/2021 at 1451   Comment    Immunostains for Ki-67, AE1/AE3, Calretinin, CD20, CD5, CD79A and BCL-2 are performed on parts 1 and 2 utilizing appropriate controls.  The atypical monomorphic population demonstrates immunoreactivity for CD79a and BCL2 with diffuse weak immunoreactivity for CD20.  Ki67 is estimated at >97%.  This proliferation is negative for CD5, Calretinin and AE-1/AE-3. A few scattered reactive mesothelial cells are identified in both specimens.  The overall findings appear consistent with involvement of both sites by the patient's known high grade B cell lymphoma.  The findings must be correlated with results of flow cytometric analysis ([facility]) (which are currently pending).  Representative sections were shared in internal consultation with Nixon Champagne and , who concurred with the above diagnosis.                                  Non-gynecologic Cytology [[id number]] (Order [id number])  Order  Date: 10/26/2021 Department: [address] Released By: Morteza Gregory (auto-released) Authorizing: Lui Farias MD       Reprint Order Requisition    Non-gynecologic Cytology (Order #[id number]) on 10/26/21           Non-gynecologic Cytology:  [id number]  Order: [id number]   Status: Final result       Visible to patient: No (scheduled for 11/3/2021  2:00 AM)       Next appt: 11/11/2021 at 07:30 AM in Oncology ([facility])      Specimen Information: A: Abdominal Cavity; Body Fluid    [id number] 2: Pleural Cavity; Body Fluid    [id number] 3: Pleural Cavity; Body Fluid         0 Result Notes      Component    Case Report   Non-gynecologic Cytology                          Case: [id number]                                   Authorizing Provider:  Lui Farias MD     Collected:           10/26/2021 10:05 AM           Ordering Location:     [facility]  Received:            10/26/2021 01:45 PM                                  [address]                                                                        Pathologist:           Fabricio Singh MD                                                          Specimens:   1) - Abdominal Cavity, Ascites                                                                       2) - Pleural Cavity                                                                                  3) - Pleural Cavity                                                                        Final Diagnosis   1.  Abdominal Cavity, Ascites:   A. Population of relatively monomorphic atypical lymphocytes with necrosis consistent with       involvement by patient's known high grade B cell lymphoma..  B. Separate fresh specimen (RPMI) sent to [facility] for flow cytometric analysis. (see [id number])     2.  Pleural Cavity, Thoracentesis:  A. Population of relatively monomorphic atypical lymphocytes with necrosis consistent with        involvement by patient's known high grade B cell lymphoma.  B. Separate fresh specimen (RPMI) sent to [facility] for flow cytometric analysis. (see [id number])     christie/jslydia    Electronically signed by Fabricio Singh MD on 11/2/2021 at 1200   Preliminary result electronically signed by Fabricio Singh  MD LUIS MANUEL on 10/28/2021 at 1451   Comment    Immunostains for Ki-67, AE1/AE3, Calretinin, CD20, CD5, CD79A and BCL-2 are performed on parts 1 and 2 utilizing appropriate controls.  The atypical monomorphic population demonstrates immunoreactivity for CD79a and BCL2 with diffuse weak immunoreactivity for CD20.  Ki67 is estimated at >97%.  This proliferation is negative for CD5, Calretinin and AE-1/AE-3. A few scattered reactive mesothelial cells are identified in both specimens.  The overall findings appear consistent with involvement of both sites by the patient's known high grade B cell lymphoma.  The findings must be correlated with results of flow cytometric analysis ([facility]) (which are currently pending).  Representative sections were shared in internal consultation with Nixon Champagne and , who concurred with the above diagnosis.        genit/melly/higinio    Gross Description    1.  Received 1150 ml of tan fluid.  Thin Prep (1) and cell block.     2.  Received 950 ml of brown fluid.  Thin Prep (1) and cell block and sent to [facility] for flow cytometry.   Microscopic Description  1.   Abdominal Cavity, Ascites:  monomorphic atypical population of intermediate to large lymphocytes with clumped chromatin, notches and grooves and macronucleoli cannot rule out a proliferative lymph node.  Correlate result with pending flow cytometry.     2.   Pleural Cavity, Thoracentesis:  monomorphic atypical population of intermediate to large lymphocytes with clumped chromatin, notches and grooves and macronucleoli cannot rule out a proliferative lymph node.  Correlate result with pending flow cytometry.       *Patient has known history of lymphoma.     Screened by:  [facility]         Resulting Agency [facility]                Specimen Collected: 10/26/21 10:05 Last Resulted: 11/02/21 12:00       Order Details       View Encounter       Lab and Collection Details       Routing       Result History               Related Result Highlights      Body Fluid Culture - Body Fluid, Peritoneum  Final result 10/26/2021             Lactate Dehydrogenase, Body Fluid - Pleural Fluid, Pleural Cavity  Final result 10/26/2021             Protein, Body Fluid - Pleural Fluid, Pleural Cavity  Final result 10/26/2021                 Scans on Order [id number]    Scan on 11/1/2021 1435 by Sadaf Fortune: FLOW CYTOMETRY AND FISH ANALYSIS REPORTS - [facility]        Document on 11/2/2021 1200 by Fabricio Singh MD           Result Care Coordination      Patient Communication    11/3/2021  2:00 AM Release Now  Not seen Back to Top                 Assessment/Plan     Diagnoses and all orders for this visit:    1. 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 Left upper quadrant abdominal mass    3. Encounter for terminal care    4. Dehydration, severe    5. Abnormal blood chemistry level    Other orders  -     lansoprazole (Prevacid SoluTab) 30 MG Tablet Delayed Release Dispersible disintegrating tablet; Take 1 tablet by mouth 2 (Two) Times a Day Before Meals.  Dispense: 60 tablet; Refill: 2  -     granisetron (Sancuso) 3.1 MG/24HR; Place 1 patch on the skin as directed by provider 1 (One) Time for 1 dose.  Dispense: 10 patch; Refill: 0     I have reviewed this patient today after his admission to the hospital a few days ago with profound fatigue, anorexia, inability to eat. Found to have large volume ascites. He underwent a paracentesis obtaining a total of 4000 mL of fluid. Pathology of this documented an abnormal population of lymphocytes and by flow cytometry these lymphocytes are CD10 positive, CD22 positive, CD38 positive and PAX5 and sKappa positive. The lymphocytes were CD20 and CD19 negative. FISH was positive for MYC rearrangement only.     I had a lengthy discussion with the patient and his wife today. In my opinion this patient is terminal at this time. The amount of tumor progression  in his abdominal examination today is more than dramatic if I compared his clinical examination with radiological assessment from a few days ago. The mass in his abdomen is almost 30 cm in diameter. It is bulging out very dramatically and is compressing the stomach to the point that the patient is not able to eat anymore or drink anymore. He is profoundly malnourished, as a consequence of this and he is profoundly volume depleted as a consequence of this.     I have placed a phone call and discussed with Dr. Clark, phone #[phone]. I pointed out to him that the patient’s ECOG performance status is 3. He looks terrible to me and he has dramatic deterioration in the last 2 weeks. All this in spite of radiation therapy to the mass at [place] and [place] chemotherapy by us 2 weeks ago. He was transfused in the hospital. The white count, hemoglobin and platelets are better. Laboratory chemistry yesterday disclosed still BUN and creatinine elevation. His LDH has been more than 1000. Putting all these issues into consideration, I do believe that this patient is terminal. I pointed out that to him and his wife today. I pointed out to him as well that I would be surprised if he is able to live more than 2 weeks from now. I suggested for him to be under Hospice care and we will place an order for him to be seen by Hospice at home. I would like to ask Hospice to give him IV fluids through the night, at least 1500 mL to keep him going for a few more days and be able to stay with the family and improve in some way quality of life.     His port can be utilized by Hospice nurses for this purpose.     I strongly believe also that the patient so far is not experiencing any pain and I have not prescribed any pain medicine for him. I advised his wife that maybe some Tylenol suppository could be amenable to control some of the symptoms in case that they happen. Given the lack of availability of the upper GI tract for  medications, I have provided for him some Sancuso patches for nausea control and we have provided as well Prevacid SoluTab tablet to dissolve in the mouth and use 1 tablet 30 mg b.i.d.     I have reviewed all the other medication that he was taking. There is no need for him to take any more of these other medications including allopurinol, furosemide, loratadine, metoprolol, multivitamin, olmesartan, Bactrim and acyclovir. I have discontinued all these medicines.     I discussed with the patient the fact that the tumor is dramatically enlarging in spite of any therapy. Dr. Clark thought that he could have enough performance status to undergo high-dose hyper-CVAD but I do not think the patient would survive that treatment anyway.     Under the present circumstances, he agrees and he was expecting to have this conversation today and he was appreciative of that. I discussed this with him and his wife. I asked them to have a video visit or phone visit with me on weekly basis. We will be glad to support him in the best way that we can and if by any chance the patient’s wife is not able to take care of him by the end, we will be glad to admit him to inpatient palliative care under Hospice umbrella.     I also discussed this with our nurses here in the office today to provide all the support that he needs and Hospice will be having a referral today.     Not planning to see him in the office anymore. There is no purpose for this anymore. They realize that the end is near.

## 2021-11-02 NOTE — NURSING NOTE
Pt discharged today on hospice care.  to put in orders. Also MD is sending in prevacid for patient. Two samples of sancuso patches were sent home with patient and wife. Current med list adjusted by  and I went over this with patient. Only to stay on prevacid, sancuso, phenergan. Can use tylenol if any pain for now unless adjusted by hospice. Port dc'd and pt wheeled down by wife. They voiced no questions or concerns about transition to hospice. Told them to please call our office with any questions or concerns at any time. Wife v/u.

## 2021-11-02 NOTE — TELEPHONE ENCOUNTER
Caller: ASA    Relationship: [facility]    Best call back number: [phone]    What form or medical record are you requesting: INSURANCE    Who is requesting this form or medical record from you: [facility]    How would you like to receive the form or medical records (pick-up, mail, fax): FAX  If fax, what is the fax number: [phone]    Timeframe paperwork needed: NEXT AVAILABLE    Additional notes: OFFICE RECD REFERRAL BUT NOT INS

## 2021-11-03 NOTE — TELEPHONE ENCOUNTER
Follow up call to Audrey re: prescriptions- She stated Jake came by and prescribed Haldol and didn't feel necessary to send in the xanax right now. But she would be in contact if she felt otherwise.

## 2021-11-04 NOTE — TELEPHONE ENCOUNTER
Returned call to Francia, she state she is in the home with Mr Saunders and he is requesting a thoracentesis tomorrow.  She suggested he also have a pleurx catheter placed.  All ok with dr Rodriges.  Unfortunately, pleurx cannot be placed tomorrow, but thoracentesis is scheduled.  Advised Francia of time.  Will work on getting pleurx placed next week sometime. Francia also reviewed some updated symptom management medication changes, all ok'd per Dr Rodriges as well.

## 2021-11-04 NOTE — OUTREACH NOTE
Medical Week 2 Survey      Responses   [facility] patient discharged from? [place]   Does the patient have one of the following disease processes/diagnoses(primary or secondary)? Other   Week 2 attempt successful? No   Unsuccessful attempts Attempt 1   Discharge diagnosis right-sided thoracentesis, lymphoma          Emily Sanchez RN

## 2021-11-05 NOTE — NURSING NOTE
Patient out to car via wheelchair and RN assist. Patient's sons picking up. No issues or concerns noted.

## 2021-11-05 NOTE — TELEPHONE ENCOUNTER
MIR WITH [employer] CALLED. SHE JUST HUNG UP WITH JESSICA SCHEDULING AN APPT AND NEEDED TO MAKE A QUICK CHANGE TO THE TIME. W/T HER TO JESSICA TO FURTHER ASSIST.

## 2021-11-05 NOTE — NURSING NOTE
Patient arrived in xray triage for a right thoracentesis. Protective goggles and mask in place with all patient interactions today.

## 2021-11-05 NOTE — TELEPHONE ENCOUNTER
Called pt's hospice nurse Francia advised her that pleurx catheter placement is scheduled for Thursday 11/11 at 10am

## 2021-11-05 NOTE — NURSING NOTE
Discharge instructions provided and reviewed with patient's wife. Wife verbalized understanding and was able to teach back instructions.

## 2021-11-05 NOTE — DISCHARGE INSTRUCTIONS
EDUCATION /DISCHARGE INSTRUCTIONS Thoracentesis:  A thoracentesis is a procedure to remove fluid or air from the space between the lung and it's lining.  It is done to relieve lung compression and respiratory distress.  A sample can also be obtained to aid diagnosis.   Medications can be administered into the space.      During the procedure:  You will be seated comfortable leaning forward onto a pillow supported by a table.  The area will be cleaned with antiseptic soap and draped with a sterile towel.  The physician will administer a local antiseptic to numb the area.  Next, the physician will insert a needle with tubing attached into the space between the lung and lining.  Fluid is removed and a sample sent to the laboratory.   When completed a pressure dressing is applied to the site.    Risks of the procedure include but are not limited to:   *  Bleeding    *  Low blood pressure *  Infection     *  Lung collapse possibly requiring insertion of a tube to re-inflate the lung.    Benefits of the procedure:  Relief of respiratory distress and facilitation of a diagnosis.  Alternatives to the procedure:  Drug therapy with diuretics to remove fluid.  Risks of diuretic drug therapy include possible dehydration and renal failure.  The benefit of drug therapy is that it can be done at home under physician supervision.  THIS EDUCATION INFORMATION WAS REVIEWED PRIOR TO THE PROCEDURE AND CONSENT. Patient initials___________________Time_____1245_____________    Post Procedure:    *  Rest today (no pushing pulling, straining or heavy lifting).   *  Slowly increase activity tomorow.    *  If you received sedation do not drive for 24 hours.   *  Keep dressing clean and dry.   *  Leave dressing on puncture site for 24 hours.    *  You may shower when dressing removed.   *  Expect some coughing as the lung re-expands.    Call your doctor if experiencing:   *  If experiencing sudden / severe shortness of breath, chest pain or if  coughing       up blood go to the nearest emergency room.   *  Signs of infection such as redness, swelling, excessive pain and / or foul       smelling drainage from the puncture site.   *  Chills or fever over 101 degrees (by mouth).   *  Change in sputum color (yellow, green, red).   *  Unrelieved pain.   *  Any new or severe symptoms.  Following the procedure:     Follow-up with the ordering physician as directed.    Continue to take other medications as directed by your physician unless    otherwise instructed.   If applicable, resume taking your blood thinners or Aspirin on __11/5/21_________.    If you have any concerns please call the Radiology Nurses Desk at [phone].  You are the most important factor in your recovery.  Follow the above instructions carefully.

## 2021-11-08 NOTE — OUTREACH NOTE
Medical Week 2 Survey      Responses   [facility] patient discharged from? [place]   Does the patient have one of the following disease processes/diagnoses(primary or secondary)? Other   Week 2 attempt successful? No   Unsuccessful attempts Attempt 2          Stacy Guo RN

## 2021-11-11 ENCOUNTER — HOSPITAL ENCOUNTER (OUTPATIENT)
Dept: INTERVENTIONAL RADIOLOGY/VASCULAR | Facility: HOSPITAL | Age: 61
End: 2021-11-11

## 2021-11-11 ENCOUNTER — APPOINTMENT (OUTPATIENT)
Dept: ONCOLOGY | Facility: HOSPITAL | Age: 61
End: 2021-11-11

## 2021-11-12 ENCOUNTER — APPOINTMENT (OUTPATIENT)
Dept: ONCOLOGY | Facility: HOSPITAL | Age: 61
End: 2021-11-12

## 2021-11-16 ENCOUNTER — READMISSION MANAGEMENT (OUTPATIENT)
Dept: CALL CENTER | Facility: HOSPITAL | Age: 61
End: 2021-11-16

## 2021-11-16 NOTE — OUTREACH NOTE
Medical Week 3 Survey      Responses   [facility] patient discharged from? [place]   Does the patient have one of the following disease processes/diagnoses(primary or secondary)? Other   Week 3 attempt successful? No   Revoke Patient           Estephania De Jessu RN

## 2021-11-19 RX ORDER — PANTOPRAZOLE SODIUM 40 MG/1
TABLET, DELAYED RELEASE ORAL
Qty: 30 TABLET | Refills: 1 | OUTPATIENT
Start: 2021-11-19

## 2022-11-16 NOTE — PROGRESS NOTES
Subjective         REASON FOR FOLLOW UP:  Large abdominal mass most likely lymphoma: biopsy shows grade 3 follicular lymphoma stage IV by PET SCAN    HISTORY OF PRESENT ILLNESS:   PATIENT WAS CALLED THE DAY BEFORE BY THE OFFICE TO ASK FOR SYMPTOMS THAT COULD BE CONSISTENT WITH CORONAVIRUS INFECTION, AND BEING NEGATIVE WAS SCHEDULED TO BE SEEN IN THE OFFICE TODAY. SIMILAR QUESTIONING TODAY INCLUDING, CHILLS, FEVER, NEW COUGH, SHORTNESS OF BREATH, DIARRHEA,DIFFUSE BODY ACHES  AND CHANGES IN SMELL OR TASTE WERE NEGATIVE.THE PATIENT DENIED ANY CONTACT WITH PERSONS WHO WERE POSITIVE FOR COVID, AND PATIENT IS NOT IN CATEGORY OF HIGH RISK BEHAVIOR TO ACQUIRE COVID.    DURING THE VISIT WITH THE PATIENT TODAY , PATIENT HAD FACE MASK, MY MEDICAL ASSISTANT AND I  HAD PROPPER PROTECTIVE EQUIPMENT, AND I DID HAND HYGIENE WITH SOAP AND WATER BEFORE AND AFTER THE VISIT.    This patient returns today to the office for follow up after he has undergone the 1st cycle of chemotherapy with CHOP/Rituxan in the background of a follicular lymphoma grade 3 with a giant mass in the abdomen that was present on the stomach. We were afraid that he was going to develop tumor lysis syndrome and he was appropriately prophylaxed and hydrated for several days in the office developing no problems and difficulties with uric acid, LDH, potassium or phosphorus. He is here today after he had some fatigue associated with the chemotherapy treatment for 7 days or so and he perked up and felt dramatically better. He has been able to accommodate more food in the stomach and the sensation of early satiety and fullness is not there anymore. He has no abdominal pain. His energy level has rebounded back. He had no pain in the bone related to the use of Neulasta On-body. His bowel activity is normal, urination is normal. He has not had any cardiovascular or respiratory issues and he remains on allopurinol. He has not had any fever, chills, night sweats, or  [address] faxed to [address], scanned into [employer]. pruritus. He has not had any cancer related pain. His ECOG performance status is 0-1.         Past Medical History:   Diagnosis Date   • Allergic rhinitis    • Gout    • H/O foreign travel 09/2020    Samuel, Jessie   • Hyperlipidemia    • Hypertension         Past Surgical History:   Procedure Laterality Date   • COLONOSCOPY  2014    normal   • TONSILLECTOMY     • VENOUS ACCESS DEVICE (PORT) INSERTION N/A 11/16/2020    Procedure: mediport placement;  Surgeon: Caitlin Bethea Jr., MD;  Location: [facility];  Service: Vascular;  Laterality: N/A;        Current Outpatient Medications on File Prior to Visit   Medication Sig Dispense Refill   • allopurinol (ZYLOPRIM) 300 MG tablet Take 1 tablet by mouth Daily. 90 tablet 3   • cetirizine (zyrTEC) 10 MG tablet Take 1 tablet by mouth Daily. 90 tablet 1   • HYDROcodone-acetaminophen (NORCO) 5-325 MG per tablet Take 1 tablet by mouth Every 6 (Six) Hours As Needed for Moderate Pain . 30 tablet 0   • metoprolol succinate XL (TOPROL-XL) 50 MG 24 hr tablet TAKE 1 TABLET BY MOUTH DAILY 90 tablet 1   • Olmesartan-amLODIPine-HCTZ 40-5-25 MG tablet Take 1 tablet by mouth Daily. 90 tablet 1   • ondansetron (ZOFRAN) 8 MG tablet Take 1 tablet by mouth Every 8 (Eight) Hours As Needed for Nausea or Vomiting. 30 tablet 5     No current facility-administered medications on file prior to visit.         ALLERGIES:  No Known Allergies     Social History     Socioeconomic History   • Marital status:      Spouse name: Aubrie   • Number of children: 2   • Years of education: College   • Highest education level: Not on file   Occupational History   • Occupation: Sales     Employer: [employer]   Tobacco Use   • Smoking status: Never Smoker   • Smokeless tobacco: Never Used   Substance and Sexual Activity   • Alcohol use: Yes     Alcohol/week: 6.0 - 8.0 standard drinks     Types: 6 - 8 Cans of beer per week     Comment: 2 times per week   • Drug use: No   • Sexual activity:  Defer      ONCOLOGIC HISTORY:The patient is a 60 y.o. year old male who is here for an opinion about the above issue.  I had the opportunity to see this delightful individual in consultation along with his wife today in the office a 60-year-old white male who came to Mr. James Epley, APRN, at [facility] for routine assessment every 6 month visit and upon clinical examination he was found to have a very large abdominal mass. Obviously a CT scan was performed that documents a very large mass that is close to the stomach independent of the spleen, pancreas, left kidney and bowel and probably representing a conglomerate of masses and tumors that probably represents a non-Hodgkin's lymphoma. The patient states that he has had some early satiety in the last several months and he is not eating as much as he used to. He also has mild element of constipation, his bowel movement is harder than usual once or twice a day. He has not seen any passage of blood in the stool. He denies emphatically any abdominal pain, sensation of fullness and he denies any fever, chills. A few nights ago he had some night sweats but he assumes it is because he had too many blankets on him. He has not had any pruritus. He does not feel fatigued. He denies any cough or sputum production, no shortness of breath, no pleuritic pain. He has not had any rashes in the skin. He denies any joint pain or bone pain. He denies any neurological symptomatology. He has no urinary complaints with no frequency, urgency or hematuria. He denies any other alterations at this time.     In Denise reviewed the patient back on 11/12/2020 with his wife in order to review the PET scan and the pathology report of the abdominal mass. 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 The Ki-67 tumor is 30%.     I reviewed with the patient and his wife the PET scan. This is very  dramatic in regard to the SUV activity uptake in the large abdominal mass and also documents retroperitoneal adenopathy, compromise of the peritoneum and also compromise of the internal mammary nodes. There is right supraclavicular adenopathy. The PET scan is more than striking. eparation for definitive chemotherapy with CHOP and Rituxan I proposed to the patient and his wife the followin. He will need to have an echocardiogram before initiation of chemotherapy. I explained to him the reason behind that.   2. I want for him to remain on metoprolol that will be cardiac protection for the time being.   3. I would like for the patient to modify his dose of allopurinol to 300 mg a day starting as per today to prevent tumor lysis syndrome.  4. The patient will be educated about CHOP and Rituxan in the next day or so. I discussed with him side effects of the medicines including anemia, leukopenia, thrombocytopenia, peripheral neuropathy, allergic reactions, fever and chills related to Rituxan administration.   5. I discussed with them the length of the 1st treatment that will require probably 6-7 hours in the office.  6. The patient has high risk of tumor lysis syndrome and for that reason he will come on day 2 and day 3 to receive 1000 cc of normal saline each one of those days and he will require BMP, uric acid, phosphorus and LDH each one of those days.   7. The patient will require Vascular Surgery consultation to proceed with port placement.   8. I indicated how a port is placed and I showed him the device.   9. The patient will be supported by Neulasta On-body and I explained to him how this device works and the side effects of Neulasta including bone pain that could be bad enough to require pain medicine.   10. I indicated to the patient that he will require chemotherapy treatment every 3 weeks for a total of 6 cycles and he will repeat a PET scan after cycle 2 and after cycle 6. He will have a CT scan after  cycle 4.         Family History   Problem Relation Age of Onset   • Rheum arthritis Brother         Review of Systems           General: no fever, no chills,  fatigue,no weight changes, no lack of appetite.  Eyes: no epiphora, xerophthalmia,conjunctivitis, pain, glaucoma, blurred vision, blindness, secretion, photophobia, proptosis, diplopia.  Ears: no otorrhea, tinnitus, otorrhagia, deafness, pain, vertigo.  Nose: no rhinorrhea, no epistaxis, no alteration in perception of odors, no sinuses pressure.  Mouth: no alteration in gums or teeth,  No ulcers, no difficulty with mastication or deglut ion, no odynophagia.  Neck: no masses or pain, no thyroid alterations, no pain in muscles or arteries, no carotid odynia, no crepitation.  Respiratory: no cough, no sputum production,no dyspnea,no trepopnea, no pleuritic pain,no hemoptysis.  Heart: no syncope, no irregularity, no palpitations, no angina,no orthopnea,no paroxysmal nocturnal dyspnea.  Vascular Venous: no tenderness,no edema,no palpable cords,no postphlebitic syndrome, no skin changes no ulcerations.  Vascular Arterial: no distal ischemia, noclaudication, no gangrene, no neuropathic ischemic pain, no skin ulcers, no paleness no cyanosis.  GI: no dysphagia, no odynophagia, no regurgitation, no heartburn,no indigestion,no nausea,no vomiting,no hematemesis ,no melena,no jaundice,no distention, no obstipation,no enterorrhagia,no proctalgia,no anal  lesions, no changes in bowel habits.  : no frequency, no hesitancy, no hematuria, no discharge,no  pain.  Musculoskeletal: no muscle or tendon pain or inflammation,no  joint pain, no edema, no functional limitation,no fasciculations, no mass.  Neurologic: no headache, no seizures, noalterations on Craneal nerves, no motor deficit, no sensory deficit, normal coordination, no alteration in memory,normal orientation, calculation,normal writting, verbal and written language.  Skin: no rashes,no pruritus no localized  "lesions.  Psychiatric: no anxiety, no depression,no agitation, no delusions, proper insight.    Objective     Vitals:    12/08/20 0803   BP: 102/66   Pulse: 90   Resp: 20   Temp: 97.1 °F (36.2 °C)   TempSrc: Temporal   SpO2: 97%   Weight: 114 kg (252 lb 1.6 oz)   Height: 193 cm (75.98\")   PainSc: 0-No pain     Current Status 12/8/2020   ECOG score 0       Physical Exam     I HAVE PERSONALLY REVIEWED THE HISTORY OF THE PRESENT ILLNESS, PAST MEDICAL HISTORY, FAMILY HISTORY, SOCIAL HISTORY, ALLERGIES, MEDICATIONS STATED ABOVE IN THE OFFICE NOTE FROM TODAY.        GENERAL:  Well-developed, well-nourished  Patient  in no acute distress.   SKIN:  Warm, dry ,NO rashes,NO purpura ,NO petechiae.  HEENT:  Pupils were equal and reactive to light and accomodation, conjunctivae noninjected, no pterygium, normal extraocular movements, normal visual acuity.   NECK:  Supple with good range of motion; no thyromegaly or masses, no JVD or bruits, no cervical adenopathies.No carotid artery pain, no carotid abnormal pulsation , NO arterial dance.  LYMPHATICS:  No cervical, NO supraclavicular, NO axillary,NO epitrochlear , NO inguinal adenopathy.  CARDIAC   normal rate and regular rhythm, without murmur,NO rubs NO S3 NO S4 right or left . Normal femoral, popliteal, pedis, brachial and carotid pulses.mnimal erythema a the port site no pain or discharge  VASCULAR ARTERIAL: normal carotids,brachial,radial,femoral,popliteal, pedis pulses , no bruits.no paleness or cyanosis, no pain, no edema, no numbness, no gangrene.  VASCULAR VENOUS: no cyanosis, collateral circulation, varicosities, edema, palpable cords, pain, erythema.  ABDOMEN:  Soft, nontender with no hepatomegaly, no splenomegaly, no ascites, no collateral circulation,no distention,no [place] sign, no abdominal pain, no inguinal hernias,no umbilical hernia, no abdominal bruits. A careful inspection and palpation of the abdomen took place. The prominence of the large mass is no longer " evident upon just inspection of the abdominal cavity. His abdomen in the upper aspect of it was completely asymmetric with a very prominent mass on the left side. This has disappeared. On palpation the large mass has disappeared almost completely. I would say 70% of the mass is gone, still minimal remnant of mass 6 cm in size in the left upper quadrant was present and it was softer. This was nonmobile and nontender with no fluctuation. There is no abdominal collateral circulation, liver enlargement or adenopathies in the groin areas. There is no ascites and there is no pain.     GENITAL: Not  Performed.  EXTREMITIES  AND SPINE:  No clubbing, cyanosis or edema, no deformities or pain .No kyphosis, scoliosis, deformities or pain in spine, ribs or pelvic bone.  NEUROLOGICAL:  Patient was awake, alert, oriented to time, person and place.          RECENT LABS:        Hematology WBC   Date Value Ref Range Status   12/08/2020 6.60 3.40 - 10.80 10*3/mm3 Final     RBC   Date Value Ref Range Status   12/08/2020 4.03 (L) 4.14 - 5.80 10*6/mm3 Final     Hemoglobin   Date Value Ref Range Status   12/08/2020 12.2 (L) 13.0 - 17.7 g/dL Final     Hematocrit   Date Value Ref Range Status   12/08/2020 35.9 (L) 37.5 - 51.0 % Final     Platelets   Date Value Ref Range Status   12/08/2020 291 140 - 450 10*3/mm3 Final       CBC:    WBC   Date Value Ref Range Status   12/08/2020 6.60 3.40 - 10.80 10*3/mm3 Final     RBC   Date Value Ref Range Status   12/08/2020 4.03 (L) 4.14 - 5.80 10*6/mm3 Final     Hemoglobin   Date Value Ref Range Status   12/08/2020 12.2 (L) 13.0 - 17.7 g/dL Final     Hematocrit   Date Value Ref Range Status   12/08/2020 35.9 (L) 37.5 - 51.0 % Final     MCV   Date Value Ref Range Status   12/08/2020 89.1 79.0 - 97.0 fL Final     MCH   Date Value Ref Range Status   12/08/2020 30.3 26.6 - 33.0 pg Final     MCHC   Date Value Ref Range Status   12/08/2020 34.0 31.5 - 35.7 g/dL Final     RDW   Date Value Ref Range Status    12/08/2020 13.6 12.3 - 15.4 % Final     RDW-SD   Date Value Ref Range Status   12/08/2020 43.6 37.0 - 54.0 fl Final     MPV   Date Value Ref Range Status   12/08/2020 10.0 6.0 - 12.0 fL Final     Platelets   Date Value Ref Range Status   12/08/2020 291 140 - 450 10*3/mm3 Final     Neutrophil %   Date Value Ref Range Status   12/08/2020 80.6 (H) 42.7 - 76.0 % Final     Lymphocyte %   Date Value Ref Range Status   12/08/2020 3.6 (L) 19.6 - 45.3 % Final     Monocyte %   Date Value Ref Range Status   12/08/2020 11.4 5.0 - 12.0 % Final     Eosinophil %   Date Value Ref Range Status   12/08/2020 1.1 0.3 - 6.2 % Final     Basophil %   Date Value Ref Range Status   12/08/2020 1.2 0.0 - 1.5 % Final     Immature Grans %   Date Value Ref Range Status   12/08/2020 2.1 (H) 0.0 - 0.5 % Final     Neutrophils, Absolute   Date Value Ref Range Status   12/08/2020 5.32 1.70 - 7.00 10*3/mm3 Final     Lymphocytes, Absolute   Date Value Ref Range Status   12/08/2020 0.24 (L) 0.70 - 3.10 10*3/mm3 Final     Monocytes, Absolute   Date Value Ref Range Status   12/08/2020 0.75 0.10 - 0.90 10*3/mm3 Final     Eosinophils, Absolute   Date Value Ref Range Status   12/08/2020 0.07 0.00 - 0.40 10*3/mm3 Final     Basophils, Absolute   Date Value Ref Range Status   12/08/2020 0.08 0.00 - 0.20 10*3/mm3 Final     Immature Grans, Absolute   Date Value Ref Range Status   12/08/2020 0.14 (H) 0.00 - 0.05 10*3/mm3 Final     nRBC   Date Value Ref Range Status   12/08/2020 0.0 0.0 - 0.2 /100 WBC Final        CMP:    Glucose   Date Value Ref Range Status   12/08/2020 126 (H) 74 - 124 mg/dL Final     BUN   Date Value Ref Range Status   12/08/2020 19 6 - 20 mg/dL Final     Creatinine   Date Value Ref Range Status   12/08/2020 1.10 0.70 - 1.30 mg/dL Final   10/27/2020 1.20 0.60 - 1.30 mg/dL Final     Comment:     Serial Number: [id number]:  [id number]     Sodium   Date Value Ref Range Status   12/08/2020 140 134 - 145 mmol/L Final     Potassium   Date Value Ref  Range Status   12/08/2020 3.6 3.5 - 4.7 mmol/L Final     Chloride   Date Value Ref Range Status   12/08/2020 101 98 - 107 mmol/L Final     CO2   Date Value Ref Range Status   12/08/2020 28.9 22.0 - 29.0 mmol/L Final     Calcium   Date Value Ref Range Status   12/08/2020 9.7 8.5 - 10.2 mg/dL Final     Total Protein   Date Value Ref Range Status   12/08/2020 7.3 6.3 - 8.0 g/dL Final     Albumin   Date Value Ref Range Status   12/08/2020 4.30 3.50 - 5.20 g/dL Final     ALT (SGPT)   Date Value Ref Range Status   12/08/2020 17 0 - 41 U/L Final     AST (SGOT)   Date Value Ref Range Status   12/08/2020 17 0 - 40 U/L Final     Alkaline Phosphatase   Date Value Ref Range Status   12/08/2020 65 38 - 116 U/L Final     Total Bilirubin   Date Value Ref Range Status   12/08/2020 0.2 0.2 - 1.2 mg/dL Final     eGFR  Am   Date Value Ref Range Status   02/24/2020 75 >60 mL/min/1.73 Final     Globulin   Date Value Ref Range Status   12/08/2020 3.0 1.8 - 3.5 gm/dL Final     A/G Ratio   Date Value Ref Range Status   12/08/2020 1.4 1.1 - 2.4 g/dL Final     BUN/Creatinine Ratio   Date Value Ref Range Status   12/08/2020 17.3 7.3 - 30.0 Final     Anion Gap   Date Value Ref Range Status   12/08/2020 10.1 5.0 - 15.0 mmol/L Final                        In summary I have seen a very healthy 60-year-old individual who is not a smoker or a drinker who has hypertension and history of hyperlipidemia as well as previous history of hyperuricemia and gout who has been seen by primary Nurse Practitioner, Mr. James Epley, for routine assessment and was found to have a very large abdominal mass. A CT was performed, I have reviewed the CT scan in the PAC system [facility]. In deed there is a very large mass independent of the kidney, pancreas, spleen, stomach and intestine. This mass is probably representation of conglomerate of lymph nodes and probably represents a non-Hodgkin's lymphoma. The patient so far has no major symptoms  besides early satiety. He tried to lose weight since last fall and he lost 25 pounds of weight until the pandemia in March and April. Since then his weight has stabilized. The rest of his clinical exam discloses no cervical, axillary or inguinal adenopathies. Again he has no obvious B symptoms.       I reviewed the patient back on 11/12/2020 with his wife in order to review the PET scan and the pathology report of the abdominal mass. 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 The Ki-67 tumor is 30%.     I reviewed with the patient and his wife the PET scan. This is very dramatic in regard to the SUV activity uptake in the large abdominal mass and also documents retroperitoneal adenopathy, compromise of the peritoneum and also compromise of the internal mammary nodes. There is right supraclavicular adenopathy. The PET scan is more than striking.     I also reviewed the peripheral blood flow cytometry that is negative for monoclonal population of cells.     I reviewed the LDH that the patient had done during the original consultation and uric acid and those numbers were normal.     With all of the above in mind I do believe that the patient has a follicular lymphoma grade 3 bulky disease with extensive large mass in the abdomen and is a stage IV. I do believe that the patient will benefit greatly of chemotherapy regimen and I discussed the case with Reed Aguirre MD, and Ricardo Collins MD, in our office. Obviously the patient is at risk of tumor lysis syndrome and that is an issue that is concerning to me.    Since the previous visit the patient proceeded with the 1st cycle of chemotherapy with CHOP, Rituxan and Neulasta On-body support. He also proceeded with hydration with IV fluids for 2 days in the office and a 3rd day the following week. he had no evidence of tumor lysis syndrome. His uric acid, phosphorus and  potassium remain stable. What is impressive today is the dramatic resolution of the asymmetry of his abdominal examination given the prominence of the large abdominal mass that was measured close to 25-30 cm across. Today we have a leftover mass that is 6 cm in size in the left upper quadrant that is still a remnant of disease process. The patient has tolerated the 1st cycle of chemotherapy extremely well. His white count, hemoglobin and platelets are back to normal and the patient has no cancer related pain. The activities of daily living are taking place with normality and he already knows the cycle of the situation that he will feel bad for a few days and then he will rebound back and feel better. Because we have damage and destroyed most of the bulk of his tumor he will not require IV hydration today.    The patient is ready to proceed today with his 2nd cycle of chemotherapy.     RECOMMENDATIONS:  1. The patient will proceed today with the 2nd cycle of chemotherapy with CHOP and Rituxan at the same dose as before and he will require to use his prednisone 100 mg a day for the next 5 days and I sent a prescription to the pharmacy for this.    2. He will remain on his dose of his allopurinol that he is taking right now without any changes.     3. The risk of tumor lysis syndrome is now down to nothing but he will not require any hydration today or tomorrow or in the next days. On the other hand I encouraged him to keep drinking plenty of liquids at home for the next 3-4 days.     4. He will have his Neulasta On-body device placed today and this will be activated tomorrow and he knows already the procedures about this.    5. He will return to the office on weekly basis for blood count and RN and he will be seen again in 3 weeks by me for the next cycle of chemotherapy.     6. In 2 weeks from now he will proceed with a PET scan that will tell us how much response he has had and that will give us a light into the  future and overall prognosis as well.     7. Given the minimal port site infection I advised him to take Bactroban ointment and apply this to the port surgical site 2-3 times a day.     8. Otherwise the patient will be ready to proceed with his next cycle in 3 weeks hopefully and we will review the PET scan with him upon return.     I discussed all of these facts with the patient and his wife in the room.           I DISCUSSED WITH PATIENT IN DETAIL FORMS TO DECREASE CHANCES OF CORONAVIRUS INFECTION INCLUDING ISOLATION, PROPER HAND HIGIENE, AVOID PUBLIC PLACES  WITH CROWDS, FOLLOW  CDC RECOMENDATIONS, AND KEEP PERSONAL AND SOCIAL RESPONSIBILITY, WARE A MASK IN PUBLIC PLACES.  PATIENT IS AWARE THIS INFECTION COULD HAVE SEVERE CONSEQUENCES TO PERSONAL HEALTH AND FAMILY RAMIFICATIONS OF THIS.

## 2023-09-25 NOTE — ADDENDUM NOTE
Addended by: ALEIDA MCCALLUM on: 10/15/2021 09:47 AM     Modules accepted: Orders     The balloon was inflated with indeflator in the  . The balloon max pressure was 14 luis e for 26 seconds

## (undated) DEVICE — DRSNG SURESITE WNDW 4X4.5

## (undated) DEVICE — GLV SURG SENSICARE PI MIC PF SZ7.5 LF STRL

## (undated) DEVICE — PENCL E/S ULTRAVAC TELESCP NOSE HOLSTR 10FT

## (undated) DEVICE — TRAP FLD MINIVAC MEGADYNE 100ML

## (undated) DEVICE — SUT PROLN 2/0 SH 36IN 8523H

## (undated) DEVICE — SOL NACL 0.9PCT 100ML SGL

## (undated) DEVICE — INTENDED FOR TISSUE SEPARATION, AND OTHER PROCEDURES THAT REQUIRE A SHARP SURGICAL BLADE TO PUNCTURE OR CUT.: Brand: BARD-PARKER ® CARBON RIB-BACK BLADES

## (undated) DEVICE — ANTIBACTERIAL UNDYED BRAIDED (POLYGLACTIN 910), SYNTHETIC ABSORBABLE SUTURE: Brand: COATED VICRYL

## (undated) DEVICE — SYR LUERLOK 20CC BX/50

## (undated) DEVICE — NDL HYPO PRECISIONGLIDE REG 25G 1 1/2

## (undated) DEVICE — STPLR SKIN VISISTAT WD 35CT

## (undated) DEVICE — LOU MINOR PROCEDURE: Brand: MEDLINE INDUSTRIES, INC.

## (undated) DEVICE — ADHS SKIN DERMABOND TOP ADVANCED

## (undated) DEVICE — DECANT BG O JET

## (undated) DEVICE — Device

## (undated) DEVICE — DRP C/ARM 41X74IN

## (undated) DEVICE — CVR PROB 96IN LF STRL